# Patient Record
Sex: MALE | Race: WHITE | NOT HISPANIC OR LATINO
[De-identification: names, ages, dates, MRNs, and addresses within clinical notes are randomized per-mention and may not be internally consistent; named-entity substitution may affect disease eponyms.]

---

## 2020-05-31 DIAGNOSIS — Z01.818 ENCOUNTER FOR OTHER PREPROCEDURAL EXAMINATION: ICD-10-CM

## 2020-05-31 PROBLEM — Z00.00 ENCOUNTER FOR PREVENTIVE HEALTH EXAMINATION: Status: ACTIVE | Noted: 2020-05-31

## 2020-06-02 ENCOUNTER — APPOINTMENT (OUTPATIENT)
Dept: DISASTER EMERGENCY | Facility: CLINIC | Age: 85
End: 2020-06-02

## 2020-06-03 LAB — SARS-COV-2 N GENE NPH QL NAA+PROBE: NOT DETECTED

## 2020-06-04 VITALS
TEMPERATURE: 98 F | SYSTOLIC BLOOD PRESSURE: 184 MMHG | OXYGEN SATURATION: 99 % | DIASTOLIC BLOOD PRESSURE: 80 MMHG | HEART RATE: 55 BPM | RESPIRATION RATE: 18 BRPM

## 2020-06-04 NOTE — H&P PST ADULT - NSICDXPASTMEDICALHX_GEN_ALL_CORE_FT
PAST MEDICAL HISTORY:  AF (atrial fibrillation)     Former tobacco use quit 1984    HTN (hypertension)     Hyperlipidemia, mild     Moderate mitral regurgitation     Severe aortic stenosis PAST MEDICAL HISTORY:  AF (atrial fibrillation)     Former tobacco use quit 1984    HTN (hypertension)     Hyperlipidemia, mild     Moderate mitral regurgitation     Severe aortic stenosis     Sinus bradycardia

## 2020-06-04 NOTE — H&P PST ADULT - HISTORY OF PRESENT ILLNESS
This is a 88 year old male PMH: HTN HL CAD MI ( 1985 )  afib/flutter ( Chads- vasc 4 on Eliquis), Severe AS,  moderate- severe MR , Hypertrophic cardiomyopathy   Pt presented to cardiologist with SUÁREZ. Pt referred for RHC and LHC with future plans for possible TAVR.     Symptoms: SUÁREZ        Angina (Class): CCS 2-3        Ischemic Symptoms:     Heart Failure: none        Systolic/Diastolic/Combined:        NYHA Class (within 2 weeks):     Assessment of LVEF       EF: 66%       Assessed by: echo        Date: 5/27/20     Prior Cardiac Interventions :none              Echo (Date, Findings): 5/27/20   Normal  global and regional LV systolic function EF 60%, mild left ventricular hypertrophy ,RA/ LA mildly dilated  Severe AS PAOLO 0.63, mild AR, moderate to severe MR, , mild IA moderate TR , peak gradient 70 with mean 40     Antianginal Therapies:       5/12/15 NST Treadmill : normal  hemodynamic response, negative ECG response, normal EF no ischemia          Associated Risk Factors:        Cerebrovascular Disease: N/A       Chronic Lung Disease: N/A       Peripheral Arterial Disease: N/A       Chronic Kidney Disease (if yes, what is GFR): N/A       Uncontrolled Diabetes (if yes, what is HgbA1C or FBS): N/A       Poorly Controlled Hypertension (if yes, what is SBP): N/A       Morbid Obesity (if yes, what is BMI): N/A       History of Recent Ventricular Arrhythmia: N/A       Inability to Ambulate Safely: N/A       Need for Therapeutic Anticoagulation: N/A       Antiplatelet or Contrast Allergy: N/A  Mallampati   ASA   GFR   BRA This is a 88 year old male PMH: HTN HL CAD MI ( 1985 )  afib/flutter ( Chads- vasc 4 on Eliquis), Severe AS,  moderate- severe MR , Hypertrophic cardiomyopathy   Pt presented to cardiologist with SÁUREZ. Pt referred for RHC and LHC with future plans for possible TAVR.     Symptoms: SUÁREZ        Angina (Class): CCS 2-3        Ischemic Symptoms:     Heart Failure: none        Systolic/Diastolic/Combined:        NYHA Class (within 2 weeks):     Assessment of LVEF       EF: 66%       Assessed by: echo        Date: 5/27/20     Prior Cardiac Interventions :none              Echo (Date, Findings): 5/27/20   Normal  global and regional LV systolic function EF 60%, mild left ventricular hypertrophy ,RA/ LA mildly dilated  Severe AS PAOLO 0.63, mild AR, moderate to severe MR, , mild AZ moderate TR , peak gradient 70 with mean 40     Antianginal Therapies:       5/12/15 NST Treadmill : normal  hemodynamic response, negative ECG response, normal EF no ischemia          Associated Risk Factors:        Cerebrovascular Disease: N/A       Chronic Lung Disease: N/A       Peripheral Arterial Disease: N/A       Chronic Kidney Disease (if yes, what is GFR): N/A       Uncontrolled Diabetes (if yes, what is HgbA1C or FBS): N/A       Poorly Controlled Hypertension (if yes, what is SBP): N/A       Morbid Obesity (if yes, what is BMI): N/A       History of Recent Ventricular Arrhythmia: N/A       Inability to Ambulate Safely: N/A       Need for Therapeutic Anticoagulation: N/A       Antiplatelet or Contrast Allergy: N/A    Mallampati  2  ASA 2  GFR   BRA 1.2

## 2020-06-04 NOTE — H&P PST ADULT - ASSESSMENT
This is a 88 year old male PMH: HTN HL CAD MI ( 1985 )  afib/flutter ( Chads- vasc 4 on Eliquis), Severe AS,  moderate- severe MR , Hypertrophic cardiomyopathy   Pt presented to cardiologist with SUÁREZ. Pt referred for RHC and LHC with future plans for possible TAVR. Npw for LHC and RHC.         Plan: PRE-PROCEDURE ASSESSMENT    -Patient seen and examined  -Labs reviewed  -Pre-procedure teaching completed with patient   -Questions answered about patients concerns     -instructed to NPO after midnight.   - Last dose of eliquis was tuesday

## 2020-06-05 ENCOUNTER — OUTPATIENT (OUTPATIENT)
Dept: OUTPATIENT SERVICES | Facility: HOSPITAL | Age: 85
LOS: 1 days | Discharge: HOME | End: 2020-06-05
Payer: MEDICARE

## 2020-06-05 ENCOUNTER — TRANSCRIPTION ENCOUNTER (OUTPATIENT)
Age: 85
End: 2020-06-05

## 2020-06-05 VITALS
SYSTOLIC BLOOD PRESSURE: 150 MMHG | DIASTOLIC BLOOD PRESSURE: 66 MMHG | RESPIRATION RATE: 15 BRPM | HEART RATE: 79 BPM | OXYGEN SATURATION: 97 %

## 2020-06-05 DIAGNOSIS — Z98.890 OTHER SPECIFIED POSTPROCEDURAL STATES: Chronic | ICD-10-CM

## 2020-06-05 DIAGNOSIS — I25.10 ATHEROSCLEROTIC HEART DISEASE OF NATIVE CORONARY ARTERY WITHOUT ANGINA PECTORIS: ICD-10-CM

## 2020-06-05 LAB
ANION GAP SERPL CALC-SCNC: 10 MMOL/L — SIGNIFICANT CHANGE UP (ref 5–17)
APTT BLD: 29.7 SEC — SIGNIFICANT CHANGE UP (ref 27.5–36.3)
BUN SERPL-MCNC: 24 MG/DL — HIGH (ref 8–20)
CALCIUM SERPL-MCNC: 9.5 MG/DL — SIGNIFICANT CHANGE UP (ref 8.6–10.2)
CHLORIDE SERPL-SCNC: 99 MMOL/L — SIGNIFICANT CHANGE UP (ref 98–107)
CO2 SERPL-SCNC: 26 MMOL/L — SIGNIFICANT CHANGE UP (ref 22–29)
CREAT SERPL-MCNC: 1.34 MG/DL — HIGH (ref 0.5–1.3)
GLUCOSE SERPL-MCNC: 106 MG/DL — HIGH (ref 70–99)
HCT VFR BLD CALC: 42.3 % — SIGNIFICANT CHANGE UP (ref 39–50)
HGB BLD-MCNC: 13.5 G/DL — SIGNIFICANT CHANGE UP (ref 13–17)
INR BLD: 1.19 RATIO — HIGH (ref 0.88–1.16)
MCHC RBC-ENTMCNC: 29.5 PG — SIGNIFICANT CHANGE UP (ref 27–34)
MCHC RBC-ENTMCNC: 31.9 GM/DL — LOW (ref 32–36)
MCV RBC AUTO: 92.4 FL — SIGNIFICANT CHANGE UP (ref 80–100)
PLATELET # BLD AUTO: 186 K/UL — SIGNIFICANT CHANGE UP (ref 150–400)
POTASSIUM SERPL-MCNC: 4 MMOL/L — SIGNIFICANT CHANGE UP (ref 3.5–5.3)
POTASSIUM SERPL-SCNC: 4 MMOL/L — SIGNIFICANT CHANGE UP (ref 3.5–5.3)
PROTHROM AB SERPL-ACNC: 13.5 SEC — HIGH (ref 10–12.9)
RBC # BLD: 4.58 M/UL — SIGNIFICANT CHANGE UP (ref 4.2–5.8)
RBC # FLD: 15.8 % — HIGH (ref 10.3–14.5)
SODIUM SERPL-SCNC: 135 MMOL/L — SIGNIFICANT CHANGE UP (ref 135–145)
WBC # BLD: 5.4 K/UL — SIGNIFICANT CHANGE UP (ref 3.8–10.5)
WBC # FLD AUTO: 5.4 K/UL — SIGNIFICANT CHANGE UP (ref 3.8–10.5)

## 2020-06-05 PROCEDURE — C1769: CPT

## 2020-06-05 PROCEDURE — 85730 THROMBOPLASTIN TIME PARTIAL: CPT

## 2020-06-05 PROCEDURE — 99152 MOD SED SAME PHYS/QHP 5/>YRS: CPT

## 2020-06-05 PROCEDURE — C1874: CPT

## 2020-06-05 PROCEDURE — 93010 ELECTROCARDIOGRAM REPORT: CPT

## 2020-06-05 PROCEDURE — C1887: CPT

## 2020-06-05 PROCEDURE — 85610 PROTHROMBIN TIME: CPT

## 2020-06-05 PROCEDURE — 99153 MOD SED SAME PHYS/QHP EA: CPT

## 2020-06-05 PROCEDURE — 93456 R HRT CORONARY ARTERY ANGIO: CPT | Mod: XU

## 2020-06-05 PROCEDURE — C1894: CPT

## 2020-06-05 PROCEDURE — 93005 ELECTROCARDIOGRAM TRACING: CPT

## 2020-06-05 PROCEDURE — 36415 COLL VENOUS BLD VENIPUNCTURE: CPT

## 2020-06-05 PROCEDURE — C1725: CPT

## 2020-06-05 PROCEDURE — 85027 COMPLETE CBC AUTOMATED: CPT

## 2020-06-05 PROCEDURE — C9600: CPT | Mod: LD

## 2020-06-05 PROCEDURE — C1889: CPT

## 2020-06-05 PROCEDURE — 80048 BASIC METABOLIC PNL TOTAL CA: CPT

## 2020-06-05 RX ORDER — CLOPIDOGREL BISULFATE 75 MG/1
300 TABLET, FILM COATED ORAL ONCE
Refills: 0 | Status: COMPLETED | OUTPATIENT
Start: 2020-06-05 | End: 2020-06-05

## 2020-06-05 RX ORDER — LOSARTAN POTASSIUM 100 MG/1
100 TABLET, FILM COATED ORAL DAILY
Refills: 0 | Status: DISCONTINUED | OUTPATIENT
Start: 2020-06-05 | End: 2020-06-20

## 2020-06-05 RX ORDER — CLOPIDOGREL BISULFATE 75 MG/1
75 TABLET, FILM COATED ORAL DAILY
Refills: 0 | Status: DISCONTINUED | OUTPATIENT
Start: 2020-06-06 | End: 2020-06-20

## 2020-06-05 RX ORDER — AMLODIPINE BESYLATE 2.5 MG/1
10 TABLET ORAL ONCE
Refills: 0 | Status: COMPLETED | OUTPATIENT
Start: 2020-06-05 | End: 2020-06-05

## 2020-06-05 RX ORDER — ASPIRIN/CALCIUM CARB/MAGNESIUM 324 MG
81 TABLET ORAL DAILY
Refills: 0 | Status: DISCONTINUED | OUTPATIENT
Start: 2020-06-06 | End: 2020-06-20

## 2020-06-05 RX ORDER — ASPIRIN/CALCIUM CARB/MAGNESIUM 324 MG
1 TABLET ORAL
Qty: 0 | Refills: 0 | DISCHARGE
Start: 2020-06-05

## 2020-06-05 RX ORDER — CLOPIDOGREL BISULFATE 75 MG/1
1 TABLET, FILM COATED ORAL
Qty: 90 | Refills: 3
Start: 2020-06-05 | End: 2021-01-01

## 2020-06-05 RX ORDER — PANTOPRAZOLE SODIUM 20 MG/1
1 TABLET, DELAYED RELEASE ORAL
Qty: 30 | Refills: 1
Start: 2020-06-05 | End: 2020-08-03

## 2020-06-05 RX ORDER — HYDRALAZINE HCL 50 MG
5 TABLET ORAL ONCE
Refills: 0 | Status: COMPLETED | OUTPATIENT
Start: 2020-06-05 | End: 2020-06-05

## 2020-06-05 RX ADMIN — LOSARTAN POTASSIUM 100 MILLIGRAM(S): 100 TABLET, FILM COATED ORAL at 12:22

## 2020-06-05 RX ADMIN — AMLODIPINE BESYLATE 10 MILLIGRAM(S): 2.5 TABLET ORAL at 13:48

## 2020-06-05 RX ADMIN — CLOPIDOGREL BISULFATE 300 MILLIGRAM(S): 75 TABLET, FILM COATED ORAL at 16:00

## 2020-06-05 RX ADMIN — Medication 5 MILLIGRAM(S): at 13:12

## 2020-06-05 NOTE — ASU PATIENT PROFILE, ADULT - PMH
AF (atrial fibrillation)    Former tobacco use  quit 1984  HTN (hypertension)    Hyperlipidemia, mild    Moderate mitral regurgitation    Severe aortic stenosis    Sinus bradycardia

## 2020-06-05 NOTE — DISCHARGE NOTE PROVIDER - HOSPITAL COURSE
CORONARY VESSELS: The coronary circulation is co-dominant.    LM:  --  LM: Angiography showed minor luminal irregularities with no flow    limiting lesions.    LAD:   --  Mid LAD: There was a tubular 95 % stenosis. The lesion was    eccentric and moderately calcified.    CX:   --  Proximal circumflex: There was a tubular 90 % stenosis. The    lesion was eccentric and moderately calcified.    --  Mid circumflex: There was a diffuse 50 % stenosis. The lesion was    irregularly contoured and heavily calcified.    RCA:   --  Ostial RCA: There was a 100 % stenosis. This lesion is a chronic    total occlusion.    COMPLICATIONS: There were no complications.    DIAGNOSTIC IMPRESSIONS: Moderate Pulmonary Hypertension. 2. Normal LV    function with LVEF of 65% on TTE from 11/2019. 3. Severe Aortic Stenosis    by TTE with a mean PG of 40mm Hgand PAOLO of 07cm2. 4. Triple vessel CAD.    5. Successful PCI to Proximal to mid LAD with ZESsx3.    DIAGNOSTIC RECOMMENDATIONS: Plavix for at least 6 months. 2. Aspirin 81mg    for 2 weeks, then to resume after Plavix is discontinued. 3. May resume    Eliquis tonite or in AM. 4. PPI or [id number] for GI protection. 5. CT    surgical evaluation for ZACARIAS. 6. Staged PCI to LCX after the ZACARIAS.    INTERVENTIONAL IMPRESSIONS: Moderate Pulmonary Hypertension. 2. Normal LV    function with LVEF of 65% on TTE from 11/2019. 3. Severe Aortic Stenosis    by TTE with a mean PG of 40mm Hg and PAOLO of 07cm2. 4. Triple vessel CAD.    5. Successful PCI to Proximal to mid LAD with ZESsx3.    INTERVENTIONAL RECOMMENDATIONS: Plavix for at least 6 months. 2. Aspirin    81mg for 2 weeks, then to resume after Plavix is discontinued. 3. May    resume Eliquis tonite or in AM. 4. PPI or [id number] for GI protection. 5.    CT surgical evaluation for ZACARIAS. 6. Staged PCI to LCX after the ZACARIAS.

## 2020-06-05 NOTE — DISCHARGE NOTE PROVIDER - CARE PROVIDER_API CALL
ERIKA ESTRELLA  [id number]  [address] [facility] [address]  [place] [address]  Phone: [phone]  Fax: ([phone]  Follow Up Time:

## 2020-06-05 NOTE — DISCHARGE NOTE PROVIDER - NSDCACTIVITY_GEN_ALL_CORE
Restricted use with no heavy lifting of affected arm for 48 hours.  No submerging the arm in water for 48 hours.  You may start showering today.  Call your doctor for any bleeding, swelling, loss of sensation in the hand or fingers, or fingers turning blue.  If heavy bleeding or large lumps form, hold pressure at the spot and come to the Emergency Room.    resume eliquis in the am     monitor for bleeding    start baby aspirin for the next 2 weeks    continue plavix 75 mg po daily indefinitely.    call to schedule TAVR with ct surgery./Driving allowed/Walking - Indoors allowed

## 2020-06-05 NOTE — DISCHARGE NOTE PROVIDER - NSDCMRMEDTOKEN_GEN_ALL_CORE_FT
aspirin 81 mg oral delayed release tablet: 1 tab(s) orally once a day  Eliquis 2.5 mg oral tablet: 1 tab(s) orally 2 times a day  finasteride 5 mg oral tablet: 1 tab(s) orally once a day  Plavix 75 mg oral tablet: 1 tab(s) orally once a day   Protonix 40 mg oral delayed release tablet: 1 tab(s) orally once a day   rosuvastatin 40 mg oral tablet: 1 tab(s) orally once a day  tamsulosin 0.4 mg oral capsule: 1 cap(s) orally once a day  valsartan 320 mg oral tablet: 1 tab(s) orally once a day  Zetia 10 mg oral tablet: 1 tab(s) orally once a day

## 2020-06-05 NOTE — PROGRESS NOTE ADULT - SUBJECTIVE AND OBJECTIVE BOX
Department of Cardiology                                                                  [facility]/[place] [address] [address]                                                            Telephone: [phone]. Fax:[phone]                                                                                         POST PCI NOTE       Subjective:  88y  Male who had a left heart catheterization which showed:       LM: Normal       LAD: Normal       LCX: Normal       RCA: Normal    PAST MEDICAL & SURGICAL HISTORY:  Sinus bradycardia  Former tobacco use: quit   Hyperlipidemia, mild  HTN (hypertension)  Moderate mitral regurgitation  AF (atrial fibrillation)  Severe aortic stenosis  H/O hernia repair    FAMILY HISTORY:    Home Medications:  Eliquis 2.5 mg oral tablet: 1 tab(s) orally 2 times a day (2020 07:59)  finasteride 5 mg oral tablet: 1 tab(s) orally once a day (2020 07:58)  rosuvastatin 40 mg oral tablet: 1 tab(s) orally once a day (2020 07:56)  tamsulosin 0.4 mg oral capsule: 1 cap(s) orally once a day (2020 07:59)  valsartan 320 mg oral tablet: 1 tab(s) orally once a day (2020 07:55)  Zetia 10 mg oral tablet: 1 tab(s) orally once a day (2020 07:56)    Patient is a 88y old  Male who presents with a chief complaint of SUÁREZ with severe AS (2020 15:35)    HEALTH ISSUES - PROBLEM Dx:        HPI:  This is a 88 year old male PMH: HTN HL CAD MI (  )  afib/flutter ( Chads- vasc 4 on Eliquis), Severe AS,  moderate- severe MR , Hypertrophic cardiomyopathy   Pt presented to cardiologist with SUÁREZ. Pt referred for RHC and LHC with future plans for possible TAVR.     Symptoms: SUÁREZ        Angina (Class): CCS 2-3        Ischemic Symptoms:     Heart Failure: none        Systolic/Diastolic/Combined:        NYHA Class (within 2 weeks):     Assessment of LVEF       EF: 66%       Assessed by: echo        Date: 20     Prior Cardiac Interventions :none              Echo (Date, Findings): 20   Normal  global and regional LV systolic function EF 60%, mild left ventricular hypertrophy ,RA/ LA mildly dilated  Severe AS PAOLO 0.63, mild AR, moderate to severe MR, , mild AZ moderate TR , peak gradient 70 with mean 40     Antianginal Therapies:       5/12/15 NST Treadmill : normal  hemodynamic response, negative ECG response, normal EF no ischemia          Associated Risk Factors:        Cerebrovascular Disease: N/A       Chronic Lung Disease: N/A       Peripheral Arterial Disease: N/A       Chronic Kidney Disease (if yes, what is GFR): N/A       Uncontrolled Diabetes (if yes, what is HgbA1C or FBS): N/A       Poorly Controlled Hypertension (if yes, what is SBP): N/A       Morbid Obesity (if yes, what is BMI): N/A       History of Recent Ventricular Arrhythmia: N/A       Inability to Ambulate Safely: N/A       Need for Therapeutic Anticoagulation: N/A       Antiplatelet or Contrast Allergy: N/A    Mallampati  2  ASA 2  GFR   BRA 1.2 (2020 15:35)    General: No fatigue, no fevers/chills  Respiratory: No dyspnea, no cough, no wheeze  CV: No chest pain, no palpitations  Abd: No nausea  Neuro: No headache, no dizziness  No Known Allergies      Objective:  Vital Signs Last 24 Hrs  T(C): 36.5 (2020 15:35), Max: 36.7 (2020 15:35)  T(F): 97.7 (2020 15:35), Max: 98 (2020 15:35)  HR: 41 (2020 12:15) (40 - 55)  BP: 200/83 (2020 12:15) (180/79 - 209/87)  BP(mean): 113 (2020 15:35) (113 - 113)  RR: 16 (2020 12:15) (12 - 18)  SpO2: 96% (2020 12:15) (95% - 99%)    CM: SR  Neuro: A&OX3, CN 2-12 intact  HEENT: NC, AT  Lungs: CTA B/L  CV: S1, S2, no murmur, RRR  Abd: Soft  Right Groin: Soft, no bleeding, no hematoma  Extremity: + distal pulses  EK.5   5.40  )-----------( 186      ( 2020 07:48 )             42.3     06-05    135  |  99  |  24.0<H>  ----------------------------<  106<H>  4.0   |  26.0  |  1.34<H>    Ca    9.5      2020 07:48      PT/INR - ( 2020 07:48 )   PT: 13.5 sec;   INR: 1.19 ratio         PTT - ( 2020 07:48 )  PTT:29.7 sec        PLAN:    - bedrest for  4    hours  - PT Will be dc home on statin, NO BB, Diovan, Plavix,, eliquis and baby aspirin for 2 weeks	  -PT WAS NOT DISCHARGED ON BB CCB STATIN ASA BECAUSE   -HOLD METFORMIN POST CATH 2 DAYS THEN RESUME AS USUAL DOSING.   - PT INTOLERANT TO STATINS WILL FOLLOW UP WITH CARDIOLOGY TO DISCUSS PRALULENT THERAPY  - DISCUSSED AND RECOMMEND CARDIAC REHAB TO OPTIMIZE FULL RECOVERY   - Antiplatelet theray to continue for one year minimum  - DUE TO AGE RELATED BLEEDING RISKS PATIENT WILL CONTINUE ON ELIQUIS AND PLAVIX NO ASPIRIN AT THIS TIME.    -TO CONTINUE  CURRENT MEDICATION AS USUAL Statin, BB, ACE/ARB.  - follow up with cardiology 2 weeks post procedure   - post produral care and instructions reviewed with the patient as well as questions answered.    - plan for discharge home when stable   - Diet instructions given for low cholesterol and Low sodium. Department of Cardiology                                                                  [facility]/[place] [address] [place] [address]                                                            Telephone: [phone]. Fax:[phone]                                                                                         POST PCI NOTE   HPI:  This is a 88 year old male PMH: HTN HL CAD MI ( 1985 )  afib/flutter ( Chads- vasc 4 on Eliquis), Severe AS,  moderate- severe MR , Hypertrophic cardiomyopathy   Pt presented to cardiologist with SUÁREZ. Pt referred for RHC and LHC with future plans for possible TAVR.         Subjective:  88y  Male who had a left heart catheterization which showed:       Significant disease of the LAD: Normal         PAST MEDICAL & SURGICAL HISTORY:  Sinus bradycardia  Former tobacco use: quit 1984  Hyperlipidemia, mild  HTN (hypertension)  Moderate mitral regurgitation  AF (atrial fibrillation)  Severe aortic stenosis  H/O hernia repair    FAMILY HISTORY:    Home Medications:  Eliquis 2.5 mg oral tablet: 1 tab(s) orally 2 times a day (05 Jun 2020 07:59)  finasteride 5 mg oral tablet: 1 tab(s) orally once a day (05 Jun 2020 07:58)  rosuvastatin 40 mg oral tablet: 1 tab(s) orally once a day (05 Jun 2020 07:56)  tamsulosin 0.4 mg oral capsule: 1 cap(s) orally once a day (05 Jun 2020 07:59)  valsartan 320 mg oral tablet: 1 tab(s) orally once a day (05 Jun 2020 07:55)  Zetia 10 mg oral tablet: 1 tab(s) orally once a day (05 Jun 2020 07:56)          Echo (Date, Findings): 5/27/20   Normal  global and regional LV systolic function EF 60%, mild left ventricular hypertrophy ,RA/ LA mildly dilated  Severe AS PAOLO 0.63, mild AR, moderate to severe MR, , mild AR moderate TR , peak gradient 70 with mean 40            5/12/15 NST Treadmill : normal  hemodynamic response, negative ECG response, normal EF no ischemia          Associated Risk Factors:        Cerebrovascular Disease: N/A       Chronic Lung Disease: N/A       Peripheral Arterial Disease: N/A       Chronic Kidney Disease (if yes, what is GFR): N/A       Uncontrolled Diabetes (if yes, what is HgbA1C or FBS): N/A       Poorly Controlled Hypertension (if yes, what is SBP): N/A       Morbid Obesity (if yes, what is BMI): N/A       History of Recent Ventricular Arrhythmia: N/A       Inability to Ambulate Safely: N/A       Need for Therapeutic Anticoagulation: N/A       Antiplatelet or Contrast Allergy: N/A    Mallampati  2  ASA 2  GFR 47  BRA 1.2 (04 Jun 2020 15:35)    General: No fatigue, no fevers/chills  Respiratory: No dyspnea, no cough, no wheeze  CV: No chest pain, no palpitations  Abd: No nausea  Neuro: No headache, no dizziness  No Known Allergies      Objective:  Vital Signs Last 24 Hrs  T(C): 36.5 (04 Jun 2020 15:35), Max: 36.7 (04 Jun 2020 15:35)  T(F): 97.7 (04 Jun 2020 15:35), Max: 98 (04 Jun 2020 15:35)  HR: 41 (05 Jun 2020 12:15) (40 - 55)  BP: 200/83 (05 Jun 2020 12:15) (180/79 - 209/87)  BP(mean): 113 (04 Jun 2020 15:35) (113 - 113)  RR: 16 (05 Jun 2020 12:15) (12 - 18)  SpO2: 96% (05 Jun 2020 12:15) (95% - 99%)    CM: bradycardia afib  Neuro: A&OX3, CN 2-12 intact  HEENT: NC, AT  Lungs: CTA B/L  CV: S1, S2, no murmur, RRR  Abd: Soft  Right radial: Soft, no bleeding, no hematoma  Extremity: + distal pulses  EKG: Post LAD RBBB No Acute Change.                         13.5   5.40  )-----------( 186      ( 05 Jun 2020 07:48 )             42.3     06-05    135  |  99  |  24.0<H>  ----------------------------<  106<H>  4.0   |  26.0  |  1.34<H>    Ca    9.5      05 Jun 2020 07:48      PT/INR - ( 05 Jun 2020 07:48 )   PT: 13.5 sec;   INR: 1.19 ratio         PTT - ( 05 Jun 2020 07:48 )  PTT:29.7 sec        PLAN:    - bedrest for  4    hours  - PT Will be dc home on statin, NO BB, Diovan, Plavix,, eliquis and baby aspirin for 2 weeks	  -PT WAS NOT DISCHARGED ON BB CCB STATIN ASA BECAUSE   -HOLD METFORMIN POST CATH 2 DAYS THEN RESUME AS USUAL DOSING.   - PT INTOLERANT TO STATINS WILL FOLLOW UP WITH CARDIOLOGY TO DISCUSS PRALULENT THERAPY  - DISCUSSED AND RECOMMEND CARDIAC REHAB TO OPTIMIZE FULL RECOVERY   - Antiplatelet theray to continue for one year minimum  - DUE TO AGE RELATED BLEEDING RISKS PATIENT WILL CONTINUE ON ELIQUIS AND PLAVIX NO ASPIRIN AT THIS TIME.    -TO CONTINUE  CURRENT MEDICATION AS USUAL Statin, BB, ACE/ARB.  - follow up with cardiology 2 weeks post procedure   - post produral care and instructions reviewed with the patient as well as questions answered.    - plan for discharge home when stable   - Diet instructions given for low cholesterol and Low sodium. Department of Cardiology                                                                  [facility]/[place] [address] [place] [address]                                                            Telephone: [phone]. Fax:[phone]                                                                                         POST PCI NOTE   HPI:  This is a 88 year old male PMH: HTN HL CAD MI ( 1985 )  afib/flutter ( Chads- vasc 4 on Eliquis), Severe AS,  moderate- severe MR , Hypertrophic cardiomyopathy   Pt presented to cardiologist with SUÁREZ. Pt referred for RHC and LHC with future plans for possible TAVR.         Subjective:  88y  Male who had a left heart catheterization which showed:       Significant disease of the LAD: Normal         PAST MEDICAL & SURGICAL HISTORY:  Sinus bradycardia  Former tobacco use: quit 1984  Hyperlipidemia, mild  HTN (hypertension)  Moderate mitral regurgitation  AF (atrial fibrillation)  Severe aortic stenosis  H/O hernia repair    FAMILY HISTORY:    Home Medications:  Eliquis 2.5 mg oral tablet: 1 tab(s) orally 2 times a day (05 Jun 2020 07:59)  finasteride 5 mg oral tablet: 1 tab(s) orally once a day (05 Jun 2020 07:58)  rosuvastatin 40 mg oral tablet: 1 tab(s) orally once a day (05 Jun 2020 07:56)  tamsulosin 0.4 mg oral capsule: 1 cap(s) orally once a day (05 Jun 2020 07:59)  valsartan 320 mg oral tablet: 1 tab(s) orally once a day (05 Jun 2020 07:55)  Zetia 10 mg oral tablet: 1 tab(s) orally once a day (05 Jun 2020 07:56)          Echo (Date, Findings): 5/27/20   Normal  global and regional LV systolic function EF 60%, mild left ventricular hypertrophy ,RA/ LA mildly dilated  Severe AS PAOLO 0.63, mild AR, moderate to severe MR, , mild ND moderate TR , peak gradient 70 with mean 40            5/12/15 NST Treadmill : normal  hemodynamic response, negative ECG response, normal EF no ischemia          Associated Risk Factors:        Cerebrovascular Disease: N/A       Chronic Lung Disease: N/A       Peripheral Arterial Disease: N/A       Chronic Kidney Disease (if yes, what is GFR): N/A       Uncontrolled Diabetes (if yes, what is HgbA1C or FBS): N/A       Poorly Controlled Hypertension (if yes, what is SBP): N/A       Morbid Obesity (if yes, what is BMI): N/A       History of Recent Ventricular Arrhythmia: N/A       Inability to Ambulate Safely: N/A       Need for Therapeutic Anticoagulation: N/A       Antiplatelet or Contrast Allergy: N/A    Mallampati  2  ASA 2  GFR 47  BRA 1.2 (04 Jun 2020 15:35)    General: No fatigue, no fevers/chills  Respiratory: No dyspnea, no cough, no wheeze  CV: No chest pain, no palpitations  Abd: No nausea  Neuro: No headache, no dizziness  No Known Allergies      Objective:  Vital Signs Last 24 Hrs  T(C): 36.5 (04 Jun 2020 15:35), Max: 36.7 (04 Jun 2020 15:35)  T(F): 97.7 (04 Jun 2020 15:35), Max: 98 (04 Jun 2020 15:35)  HR: 41 (05 Jun 2020 12:15) (40 - 55)  BP: 200/83 (05 Jun 2020 12:15) (180/79 - 209/87)  BP(mean): 113 (04 Jun 2020 15:35) (113 - 113)  RR: 16 (05 Jun 2020 12:15) (12 - 18)  SpO2: 96% (05 Jun 2020 12:15) (95% - 99%)    CM: bradycardia afib  Neuro: A&OX3, CN 2-12 intact  HEENT: NC, AT  Lungs: CTA B/L  CV: S1, S2, no murmur, RRR  Abd: Soft  Right radial: Soft, no bleeding, no hematoma  Extremity: + distal pulses  EKG: Post LAD RBBB No Acute Change.                         13.5   5.40  )-----------( 186      ( 05 Jun 2020 07:48 )             42.3     06-05    135  |  99  |  24.0<H>  ----------------------------<  106<H>  4.0   |  26.0  |  1.34<H>    Ca    9.5      05 Jun 2020 07:48      PT/INR - ( 05 Jun 2020 07:48 )   PT: 13.5 sec;   INR: 1.19 ratio         PTT - ( 05 Jun 2020 07:48 )  PTT:29.7 sec    89 yo male with Afib post C for TAVR wound up having LAD stents x3 will still need TAVR at some point.      PLAN:    - bedrest for  4    hours  - PT Will be dc home on statin, NO BB, Diovan, Plavix,, eliquis and baby aspirin for 2 weeks	  -Follow up with CTS for TAVR  - DISCUSSED AND RECOMMEND CARDIAC REHAB TO OPTIMIZE FULL RECOVERY   - Antiplatelet theray to continue for one year minimum  - follow up with cardiology 2 weeks post procedure   - post produral care and instructions reviewed with the patient as well as questions answered.    - plan for discharge home when stable   - Diet instructions given for low cholesterol and Low sodium.

## 2020-06-05 NOTE — DISCHARGE NOTE NURSING/CASE MANAGEMENT/SOCIAL WORK - PATIENT PORTAL LINK FT
You can access the FollowMyHealth Patient Portal offered by [facility] by registering at the following website: [email or web address]. By joining [employer]’s FollowMyHealth portal, you will also be able to view your health information using other applications (apps) compatible with our system.

## 2020-06-05 NOTE — DISCHARGE NOTE PROVIDER - NSDCFUADDINST_GEN_ALL_CORE_FT
INTERVENTIONAL RECOMMENDATIONS FROM DR Olson for at least 6 months. 2. Aspirin  81mg for 2 weeks, then to resume after Plavix is discontinued. 3. May  resume Eliquis tonite or in AM. 4. PPI or [id number] for GI protection. 5.  CT surgical evaluation for ZACARIAS. 6. Staged PCI to LCX after the ZACARIAS.

## 2020-06-08 DIAGNOSIS — I35.0 NONRHEUMATIC AORTIC (VALVE) STENOSIS: ICD-10-CM

## 2020-06-09 PROCEDURE — 99222 1ST HOSP IP/OBS MODERATE 55: CPT

## 2020-06-09 NOTE — CONSULT NOTE ADULT - SUBJECTIVE AND OBJECTIVE BOX
Surgeon:    Consult requesting by:    HISTORY OF PRESENT ILLNESS:  88y Male    PAST MEDICAL & SURGICAL HISTORY:  Sinus bradycardia  Former tobacco use: quit 1984  Hyperlipidemia, mild  HTN (hypertension)  Moderate mitral regurgitation  AF (atrial fibrillation)  Severe aortic stenosis  H/O hernia repair      MEDICATIONS  (STANDING):  aspirin enteric coated 81 milliGRAM(s) Oral daily  clopidogrel Tablet 75 milliGRAM(s) Oral daily  losartan 100 milliGRAM(s) Oral daily    MEDICATIONS  (PRN):    Antiplatelet therapy:                           Last dose/amt:    Allergies    No Known Allergies    Intolerances        SOCIAL HISTORY:  Smoker: [ ] Yes  [ ] No        PACK YEARS:                         WHEN QUIT?  ETOH use: [ ] Yes  [ ] No              FREQUENCY / QUANTITY:  Ilicit Drug use:  [ ] Yes  [ ] No  Occupation:  Live with:  Assisted device use:    FAMILY HISTORY:      Review of Systems  CONSTITUTIONAL:  Fevers[ ] chills[ ] sweats[ ] fatigue[ ] weight loss[ ] weight gain [ ]                                     NEGATIVE [ ]   NEURO:  parathesias[ ] seizures [ ]  syncope [ ]  confusion [ ]                                                                                NEGATIVE[ ]   EYES: glasses[ ]  blurry vision[ ]  discharge[ ] pain[ ] glaucoma [ ]                                                                          NEGATIVE[ ]   ENMT:  difficulty hearing [ ]  vertigo[ ]  dysphagia[ ] epistaxis[ ] recent dental work [ ]                                    NEGATIVE[ ]   CV:  chest pain[ ] palpitations[ ] SUÁREZ [ ] diaphoresis [ ]                                                                                           NEGATIVE[ ]   RESPIRATORY:  wheezing[ ] SOB[ ] cough [ ] sputum[ ] hemoptysis[ ]                                                                  NEGATIVE[ ]   GI:  nausea[ ]  vommiting [ ]  diarrhea[ ] constipation [ ] melena [ ]                                                                      NEGATIVE[ ]   : hematuria[ ]  dysuria[ ] urgency[ ] incontinence[ ]                                                                                            NEGATIVE[ ]   MUSKULOSKELETAL:  arthritis[ ]  joint swelling [ ] muscle weakness [ ]                                                                NEGATIVE[ ]   SKIN/BREAST:  rash[ ] itching [ ]  hair loss[ ] masses[ ]                                                                                              NEGATIVE[ ]   PSYCH:  dementia [ ] depresion [ ] anxiety[ ]                                                                                                               NEGATIVE[ ]   HEME/LYMPH:  bruises easily[ ] enlarged lymph nodes[ ] tender lymph nodes[ ]                                               NEGATIVE[ ]   ENDOCRINE:  cold intolerance[ ] heat intolerance[ ] polydipsia[ ]                                                                          NEGATIVE[ ]     PHYSICAL EXAM  Vital Signs Last 24 Hrs  T(C): --  T(F): --  HR: --  BP: --  BP(mean): --  RR: --  SpO2: --    CONSTITUTIONAL:                                                                          WNL[ ]   Neuro: WNL[ ] Normal exam oriented to person/place & time with no focal motor or sensory  deficits. Other                     Eyes: WNL[ ]   Normal exam of conjunctiva & lids, pupils equally reactive. Other     ENT: WNL[ ]    Normal exam of nasal/oral mucosa with absence of cyanosis. Other  Neck: WNL[ ]  Normal exam of jugular veins, trachea & thyroid. Other  Chest: WNL[ ] Normal lung exam with good air movement absence of wheezes, rales, or rhonchi: Other                                                                                CV:  Auscultation: normal [ ] S3[ ] S4[ ] Irregular [ ] Rub[ ] Clicks[ ]    Murmurs none:[ ]systolic [ ]  diastolic [ ] holosystolic [ ]  Carotids: No Bruits[ ] Other                 Abdominal Aorta: normal [ ] nonpalpable[ ]Other                                                                                      GI:           WNL[ ] Normal exam of abdomen, liver & spleen with no noted masses or tenderness. Other                                                                                                        Extremities: WNL[ ] Normal no evidence of cyanosis or deformity Edema: none[ ]trace[ ]1+[ ]2+[ ]3+[ ]4+[ ]  Lower Extremity Pulses: Right[ ] Left[ ]Varicosities[ ]  SKIN :WNL[ ] Normal exam to inspection & palation. Other:                                                          LABS:          Cardiac Cath: < from: Cardiac Cath Lab - Adult (06.05.20 @ 08:25) >  VENTRICLES: Global left ventricular function was normal. EF by echo was 65  %.  CORONARY VESSELS: The coronary circulation is co-dominant.  LM:  --  LM: Angiography showed minor luminal irregularities with no flow  limiting lesions.  LAD:   --  Mid LAD: There was a tubular 95 % stenosis. The lesion was  eccentric and moderately calcified.  CX:   --  Proximal circumflex: There was a tubular 90 % stenosis. The  lesion was eccentric and moderately calcified.  --  Mid circumflex: There was a diffuse 50 % stenosis. The lesion was  irregularly contoured and heavily calcified.  RCA:   --  Ostial RCA: There was a 100 % stenosis. This lesion is a chronic  total occlusion.  COMPLICATIONS: There were no complications.  DIAGNOSTIC IMPRESSIONS: Moderate Pulmonary Hypertension. 2. Normal LV  function with LVEF of 65% on TTE from 11/2019. 3. Severe Aortic Stenosis  by TTE with a mean PG of 40mm Hgand PAOLO of 07cm2. 4. Triple vessel CAD.  5. Successful PCI to Proximal to mid LAD with ZESsx3.  DIAGNOSTIC RECOMMENDATIONS: Plavix for at least 6 months. 2. Aspirin 81mg  for 2 weeks, then to resume after Plavix is discontinued. 3. May resume  Eliquis tonite or in AM. 4. PPI or [id number] for GI protection. 5. CT  surgical evaluation for ZACARIAS. 6. Staged PCI to LCX after the ZACARIAS.  INTERVENTIONAL IMPRESSIONS: Moderate Pulmonary Hypertension. 2. Normal LV  function with LVEF of 65% on TTE from 11/2019. 3. Severe Aortic Stenosis  by TTE with a mean PG of 40mm Hg and PAOLO of 07cm2. 4. Triple vessel CAD.  5. Successful PCI to Proximal to mid LAD with ZESsx3.  INTERVENTIONAL RECOMMENDATIONS: Plavix for at least 6 months. 2. Aspirin  81mg for 2 weeks, then to resume after Plavix is discontinued. 3. May  resume Eliquis tonite or in AM. 4. PPI or [id number] for GI protection. 5.  CT surgical evaluation for ZACARIAS. 6. Staged PCI to LCX after the ZACARIAS.  Prepared and signed by  Clay Cary M.D.  Signed 06/05/2020 11:03:54    < end of copied text >      TTE / WJOCIECH:  none Surgeon:    Consult requesting by:    HISTORY OF PRESENT ILLNESS:  The patient is a very pleasant 88-year-old male. He lives with his wife and remains quite active. His cardiovascular structures including hypertension, hyperlipidemia, and chronic atrial fibrillation. He is a former smoker but quit many years ago. The patient reports worsening exertional dyspnea and occasional lower extremity edema. Echocardiogram confirmed severe aortic valve stenosis. The patient is status post cardiac catheterization. He is status post PCI of the LAD. This was a highly calcified lesion, but rotoblade was not required. There is additional moderate disease that may require PCI following T. AVR. Cardiothoracic consultation is requested consideration of the patient to transect the recover placement.    The patient's past medical history, past surgical history, family history, social history, allergies, medications, and multisystem review of systems were individually reviewed with the patient.  There are no pertinent additions or subtractions.  The patient was personally seen and examined.      PAST MEDICAL & SURGICAL HISTORY:  Sinus bradycardia  Former tobacco use: quit 1984  Hyperlipidemia, mild  HTN (hypertension)  Moderate mitral regurgitation  AF (atrial fibrillation)  Severe aortic stenosis  H/O hernia repair      MEDICATIONS  (STANDING):  aspirin enteric coated 81 milliGRAM(s) Oral daily  clopidogrel Tablet 75 milliGRAM(s) Oral daily  losartan 100 milliGRAM(s) Oral daily    MEDICATIONS  (PRN):    Antiplatelet therapy:                           Last dose/amt:    Allergies    No Known Allergies    Intolerances        SOCIAL HISTORY:  Smoker: [ ] Yes  [ ] No        PACK YEARS:                         WHEN QUIT?  ETOH use: [ ] Yes  [ ] No              FREQUENCY / QUANTITY:  Ilicit Drug use:  [ ] Yes  [ ] No  Occupation:  Live with:  Assisted device use:    FAMILY HISTORY:      Review of Systems  CONSTITUTIONAL:  Fevers[ ] chills[ ] sweats[ ] fatigue[ ] weight loss[ ] weight gain [ ]                                     NEGATIVE [ ]   NEURO:  parathesias[ ] seizures [ ]  syncope [ ]  confusion [ ]                                                                                NEGATIVE[ ]   EYES: glasses[ ]  blurry vision[ ]  discharge[ ] pain[ ] glaucoma [ ]                                                                          NEGATIVE[ ]   ENMT:  difficulty hearing [ ]  vertigo[ ]  dysphagia[ ] epistaxis[ ] recent dental work [ ]                                    NEGATIVE[ ]   CV:  chest pain[ ] palpitations[ ] SUÁREZ [ ] diaphoresis [ ]                                                                                           NEGATIVE[ ]   RESPIRATORY:  wheezing[ ] SOB[ ] cough [ ] sputum[ ] hemoptysis[ ]                                                                  NEGATIVE[ ]   GI:  nausea[ ]  vommiting [ ]  diarrhea[ ] constipation [ ] melena [ ]                                                                      NEGATIVE[ ]   : hematuria[ ]  dysuria[ ] urgency[ ] incontinence[ ]                                                                                            NEGATIVE[ ]   MUSKULOSKELETAL:  arthritis[ ]  joint swelling [ ] muscle weakness [ ]                                                                NEGATIVE[ ]   SKIN/BREAST:  rash[ ] itching [ ]  hair loss[ ] masses[ ]                                                                                              NEGATIVE[ ]   PSYCH:  dementia [ ] depresion [ ] anxiety[ ]                                                                                                               NEGATIVE[ ]   HEME/LYMPH:  bruises easily[ ] enlarged lymph nodes[ ] tender lymph nodes[ ]                                               NEGATIVE[ ]   ENDOCRINE:  cold intolerance[ ] heat intolerance[ ] polydipsia[ ]                                                                          NEGATIVE[ ]     GENERAL: frail-appearing 88-year-old gentleman, no acute distress  HEENT: Normocephalic, atraumatic, extraocular muscles intact, PERRLA, anicteric sclera, conjunctiva without injection  Neck: Supple, no carotid bruits bilaterally, no JVD  Chest: Clear to auscultation bilaterally without wheezes or rhonchi, normal I:E ratio  Cardiac: Regular rate and rhythm S1, absent S2. Harsh systolic murmur cardiac base  Abdomen: The abdomen is scaphoid, soft, nontender and nondistended.  Positive bowel sounds.  There is no hepatosplenomegaly.  There are no masses or fascial defects appreciated.  Extremities: The extremities are warm and well-perfused.  There is trace edema bilateral lower extremities  Vascular: Carotid, radial, and femoral pulses are 2-3+ bilaterally.  Dorsalis pedis and posterior tibial artery pulses are faint bilaterally  Neuro: The patient is neurologically intact.  There are no gross motor deficits.  Gait is within normal limits  Psychiatric: The patient is alert and oriented X3, mood and affect are appropriate  Skin: Without rashes      CONSTITUTIONAL:                                                                          WNL[ ]   Neuro: WNL[ ] Normal exam oriented to person/place & time with no focal motor or sensory  deficits. Other                     Eyes: WNL[ ]   Normal exam of conjunctiva & lids, pupils equally reactive. Other     ENT: WNL[ ]    Normal exam of nasal/oral mucosa with absence of cyanosis. Other  Neck: WNL[ ]  Normal exam of jugular veins, trachea & thyroid. Other  Chest: WNL[ ] Normal lung exam with good air movement absence of wheezes, rales, or rhonchi: Other                                                                                CV:  Auscultation: normal [ ] S3[ ] S4[ ] Irregular [ ] Rub[ ] Clicks[ ]    Murmurs none:[ ]systolic [ ]  diastolic [ ] holosystolic [ ]  Carotids: No Bruits[ ] Other                 Abdominal Aorta: normal [ ] nonpalpable[ ]Other                                                                                      GI:           WNL[ ] Normal exam of abdomen, liver & spleen with no noted masses or tenderness. Other                                                                                                        Extremities: WNL[ ] Normal no evidence of cyanosis or deformity Edema: none[ ]trace[ ]1+[ ]2+[ ]3+[ ]4+[ ]  Lower Extremity Pulses: Right[ ] Left[ ]Varicosities[ ]  SKIN :WNL[ ] Normal exam to inspection & palpation. Other:                                                          LABS:          Cardiac Cath: < from: Cardiac Cath Lab - Adult (06.05.20 @ 08:25) >  VENTRICLES: Global left ventricular function was normal. EF by echo was 65  %.  CORONARY VESSELS: The coronary circulation is co-dominant.  LM:  --  LM: Angiography showed minor luminal irregularities with no flow  limiting lesions.  LAD:   --  Mid LAD: There was a tubular 95 % stenosis. The lesion was  eccentric and moderately calcified.  CX:   --  Proximal circumflex: There was a tubular 90 % stenosis. The  lesion was eccentric and moderately calcified.  --  Mid circumflex: There was a diffuse 50 % stenosis. The lesion was  irregularly contoured and heavily calcified.  RCA:   --  Ostial RCA: There was a 100 % stenosis. This lesion is a chronic  total occlusion.  COMPLICATIONS: There were no complications.  DIAGNOSTIC IMPRESSIONS: Moderate Pulmonary Hypertension. 2. Normal LV  function with LVEF of 65% on TTE from 11/2019. 3. Severe Aortic Stenosis  by TTE with a mean PG of 40mm Hgand PAOLO of 07cm2. 4. Triple vessel CAD.  5. Successful PCI to Proximal to mid LAD with ZESsx3.  DIAGNOSTIC RECOMMENDATIONS: Plavix for at least 6 months. 2. Aspirin 81mg  for 2 weeks, then to resume after Plavix is discontinued. 3. May resume  Eliquis tonite or in AM. 4. PPI or [id number] for GI protection. 5. CT  surgical evaluation for ZACARIAS. 6. Staged PCI to LCX after the ZACARIAS.  INTERVENTIONAL IMPRESSIONS: Moderate Pulmonary Hypertension. 2. Normal LV  function with LVEF of 65% on TTE from 11/2019. 3. Severe Aortic Stenosis  by TTE with a mean PG of 40mm Hg and PAOLO of 07cm2. 4. Triple vessel CAD.  5. Successful PCI to Proximal to mid LAD with ZESsx3.  INTERVENTIONAL RECOMMENDATIONS: Plavix for at least 6 months. 2. Aspirin  81mg for 2 weeks, then to resume after Plavix is discontinued. 3. May  resume Eliquis tonite or in AM. 4. PPI or [id number] for GI protection. 5.  CT surgical evaluation for ZACARIAS. 6. Staged PCI to LCX after the ZACARIAS.  Prepared and signed by  Clay Cary M.D.  Signed 06/05/2020 11:03:54    < end of copied text >      TTE / WOJCIECH:  none

## 2020-06-09 NOTE — CONSULT NOTE ADULT - ATTENDING COMMENTS
The patient is a vertical 80-year-old gentleman suffering from severe aortic valve stenosis. With his advanced age, surgical aortic valve replacement is out of the question. The patient is an ideal candidate for transcatheter aortic valve replacement. The transcatheter procedure was discussed in detail the patient. CT angiogram, carotid duplex, and repeat echocardiogram will he obtained here at [facility]. Once his workup is completed, all studies will be personally reviewed.    Risks benefits and alternatives to transcatheter aortic valve placement were discussed with the patient in detail.  Risks discussed included, but not limited to infection, bleeding, myocardial infarction, cerebrovascular accident, renal failure,  vascular injury requiring intervention, cardiac rupture, and death.  In addition, a roughly 5-10% risk of permanent pacemaker requirement was highlighted.   Furthermore, the patient's STS score and its significance were discussed directly with the patient and family.  I would like to thank you for referring this patient to my attention and for allowing me to participate in his care.  If there are any further questions, or I can be of any further assistance, please do not hesitate contacting me at any time.

## 2020-06-12 PROBLEM — I34.0 NONRHEUMATIC MITRAL (VALVE) INSUFFICIENCY: Chronic | Status: ACTIVE | Noted: 2020-06-04

## 2020-06-12 PROBLEM — E78.5 HYPERLIPIDEMIA, UNSPECIFIED: Chronic | Status: ACTIVE | Noted: 2020-06-04

## 2020-06-12 PROBLEM — Z87.891 PERSONAL HISTORY OF NICOTINE DEPENDENCE: Chronic | Status: ACTIVE | Noted: 2020-06-04

## 2020-06-12 PROBLEM — I35.0 NONRHEUMATIC AORTIC (VALVE) STENOSIS: Chronic | Status: ACTIVE | Noted: 2020-06-04

## 2020-06-12 PROBLEM — I48.91 UNSPECIFIED ATRIAL FIBRILLATION: Chronic | Status: ACTIVE | Noted: 2020-06-04

## 2020-06-12 PROBLEM — R00.1 BRADYCARDIA, UNSPECIFIED: Chronic | Status: ACTIVE | Noted: 2020-06-05

## 2020-06-12 PROBLEM — I10 ESSENTIAL (PRIMARY) HYPERTENSION: Chronic | Status: ACTIVE | Noted: 2020-06-04

## 2020-06-18 ENCOUNTER — RESULT REVIEW (OUTPATIENT)
Age: 85
End: 2020-06-18

## 2020-06-18 ENCOUNTER — OUTPATIENT (OUTPATIENT)
Dept: OUTPATIENT SERVICES | Facility: HOSPITAL | Age: 85
LOS: 1 days | End: 2020-06-18
Payer: MEDICARE

## 2020-06-18 DIAGNOSIS — I35.0 NONRHEUMATIC AORTIC (VALVE) STENOSIS: ICD-10-CM

## 2020-06-18 DIAGNOSIS — Z98.890 OTHER SPECIFIED POSTPROCEDURAL STATES: Chronic | ICD-10-CM

## 2020-06-18 LAB
BUN SERPL-MCNC: 19 MG/DL — SIGNIFICANT CHANGE UP (ref 8–20)
CREAT SERPL-MCNC: 1.13 MG/DL — SIGNIFICANT CHANGE UP (ref 0.5–1.3)

## 2020-06-18 PROCEDURE — 82565 ASSAY OF CREATININE: CPT

## 2020-06-18 PROCEDURE — 93306 TTE W/DOPPLER COMPLETE: CPT

## 2020-06-18 PROCEDURE — 93306 TTE W/DOPPLER COMPLETE: CPT | Mod: 26

## 2020-06-18 PROCEDURE — 74174 CTA ABD&PLVS W/CONTRAST: CPT

## 2020-06-18 PROCEDURE — 74174 CTA ABD&PLVS W/CONTRAST: CPT | Mod: 26

## 2020-06-18 PROCEDURE — 36415 COLL VENOUS BLD VENIPUNCTURE: CPT

## 2020-06-18 PROCEDURE — 84520 ASSAY OF UREA NITROGEN: CPT

## 2020-06-18 PROCEDURE — 71275 CT ANGIOGRAPHY CHEST: CPT

## 2020-06-18 PROCEDURE — 71275 CT ANGIOGRAPHY CHEST: CPT | Mod: 26

## 2020-06-30 DIAGNOSIS — Z86.79 PERSONAL HISTORY OF OTHER DISEASES OF THE CIRCULATORY SYSTEM: ICD-10-CM

## 2020-06-30 DIAGNOSIS — Z87.891 PERSONAL HISTORY OF NICOTINE DEPENDENCE: ICD-10-CM

## 2020-06-30 DIAGNOSIS — Z86.39 PERSONAL HISTORY OF OTHER ENDOCRINE, NUTRITIONAL AND METABOLIC DISEASE: ICD-10-CM

## 2020-06-30 DIAGNOSIS — I34.0 NONRHEUMATIC MITRAL (VALVE) INSUFFICIENCY: ICD-10-CM

## 2020-07-01 ENCOUNTER — APPOINTMENT (OUTPATIENT)
Dept: CARDIOTHORACIC SURGERY | Facility: CLINIC | Age: 85
End: 2020-07-01
Payer: MEDICARE

## 2020-07-01 VITALS
HEART RATE: 69 BPM | SYSTOLIC BLOOD PRESSURE: 163 MMHG | RESPIRATION RATE: 15 BRPM | OXYGEN SATURATION: 95 % | TEMPERATURE: 97.8 F | WEIGHT: 119.7 LBS | BODY MASS INDEX: 20.43 KG/M2 | HEIGHT: 64 IN | DIASTOLIC BLOOD PRESSURE: 73 MMHG

## 2020-07-01 DIAGNOSIS — Z82.49 FAMILY HISTORY OF ISCHEMIC HEART DISEASE AND OTHER DISEASES OF THE CIRCULATORY SYSTEM: ICD-10-CM

## 2020-07-01 DIAGNOSIS — Z80.1 FAMILY HISTORY OF MALIGNANT NEOPLASM OF TRACHEA, BRONCHUS AND LUNG: ICD-10-CM

## 2020-07-01 PROCEDURE — 99215 OFFICE O/P EST HI 40 MIN: CPT

## 2020-07-01 RX ORDER — FINASTERIDE 5 MG/1
5 TABLET, FILM COATED ORAL
Refills: 0 | Status: ACTIVE | COMMUNITY

## 2020-07-01 RX ORDER — TAMSULOSIN HYDROCHLORIDE 0.4 MG/1
0.4 CAPSULE ORAL
Refills: 0 | Status: ACTIVE | COMMUNITY

## 2020-07-01 RX ORDER — EZETIMIBE 10 MG/1
10 TABLET ORAL
Refills: 0 | Status: ACTIVE | COMMUNITY

## 2020-07-01 RX ORDER — ROSUVASTATIN CALCIUM 40 MG/1
40 TABLET, FILM COATED ORAL
Refills: 0 | Status: ACTIVE | COMMUNITY

## 2020-07-01 NOTE — REVIEW OF SYSTEMS
[SOB on Exertion] : shortness of breath during exertion [Feeling Poorly] : not feeling poorly [Feeling Tired] : not feeling tired [Eyesight Problems] : no eyesight problems [Discharge From Eyes] : no purulent discharge from the eyes [Nosebleeds] : no nosebleeds [Nasal Discharge] : no nasal discharge [Chest Pain] : no chest pain [Palpitations] : no palpitations [Wheezing] : no wheezing [Cough] : no cough [Constipation] : no constipation [Diarrhea] : no diarrhea [Hesitancy] : no urinary hesitancy [Nocturia] : no nocturia [Joint Swelling] : no joint swelling [Joint Stiffness] : no joint stiffness [Itching] : no itching [Change In A Mole] : no change in a mole [Dizziness] : no dizziness [Anxiety] : no anxiety [Depression] : no depression [Muscle Weakness] : no muscle weakness [Erectile Dysfunction] : no erectile dysfunction [Swollen Glands] : no swollen glands [Swollen Glands In The Neck] : no swollen glands in the neck

## 2020-07-01 NOTE — ASSESSMENT
[FreeTextEntry1] : The patient is a very +88-year-old gentleman. His cardiovascular risk factors include hypertension, hyperlipidemia, chronic atrial fibrillation and advanced age. Cardiac catheterization on June 18 revealed calcific multivessel coronary disease. He is status post Rotablator/stenting of the LAD. He is on aspirin and Plavix status post PCI, an Eliquis for the Afib.  Patient reports progressively worsening exertional dyspnea. The daughter reports that she has noticed a progressive slowing down over the past 6 months. Echocardiogram confirms severe aortic valve stenosis. Peak and mean gradients are 83.4 and 40.8mmHg respectively. CT angiogram has been reviewed. 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 The anatomy is amenable to a 23 mm S3 transcatheter heart valve with femoral access. The transcatheter procedure was discussed in detail with the patient and family.\par \par Risks benefits and alternatives to transcatheter aortic valve placement were discussed with the patient in detail.  Risks discussed included, but not limited to infection, bleeding, myocardial infarction, cerebrovascular accident, renal failure,  vascular injury requiring intervention, cardiac rupture, and death.  In addition, a roughly 5-10% risk of permanent pacemaker requirement was highlighted.   Furthermore, the patient's STS score and its significance were discussed directly with the patient and family.\par I would like to thank you for referring this patient to my attention and for allowing me to participate in his care.  If there are any further questions, or I can be of any further assistance, please do not hesitate contacting me at any time.\par \par Written by Bishop Yun NP acting as a scribe for par “The documentation recorded by the scribe accurately reflects the service I personally performed and the decisions made by me.” \trav Carney MD.\par

## 2020-07-01 NOTE — CONSULT LETTER
[Dear  ___] : Dear  [unfilled], [Courtesy Letter:] : I had the pleasure of seeing your patient, [unfilled], in my office today. [Please see my note below.] : Please see my note below. [Referral Closing:] : Thank you very much for seeing this patient.  If you have any questions, please do not hesitate to contact me. [Sincerely,] : Sincerely, [FreeTextEntry2] : Dr Lake\par       [address]\[place] [address]\par   p- [phone]\par   f - [phone] [FreeTextEntry3] : Ravi Carney MD\par Chief of Cardiovascular & Thoracic Surgery\par System Director of Endovascular & Cardiovascular Surgery\par \par Cardiovascular & Thoracic Surgery\par [facility] [employer]\par \par [facility] \par [address] \[place] [address]\par Tel   [phone]\par Fax  [phone]\par

## 2020-07-01 NOTE — HISTORY OF PRESENT ILLNESS
[FreeTextEntry1] : Mr. Whitman is an 88 year old male former smoker referred by Dr. Cary with Past Medical History pertinent for HTN, HLD, A Fib (on Eliquis), Stents (06/18/2020), MI, and Aortic Stenosis. \par \par Recent Transthoracic Echo on 6/18/2020 demonstrated Severe AS, pGr 83.4 mmHg, mGr 40.8 mmHg, PAOLO 0.75 cm2, AoV 4.57 m/s and LVEF 63%. Patient referred for consultation for consideration for TAVR.\par \par Today he reports feeling well besides shortness of breath on exertion that has gotten worse over the past year. Denies any chest pain, dizziness, palpitations, shortness of breath, syncopal episode, lower extremity edema, fevers, chill, or other related symptoms. \par \par TTE and Cath personally reviewed.\par \par The patient's past medical history, past surgical history, family history, social history, allergies, medications, and multisystem review of systems were individually reviewed with the patient.  There are no pertinent additions or subtractions.  The patient was personally seen and examined.\par \par

## 2020-07-01 NOTE — DATA REVIEWED
[FreeTextEntry1] : CTA performed on 06/18/2020 revealed \par aortic measurements as described \par small amount of deep pelvic ascites of unclear etiology. \par \par Recent Transthoracic Echo on 6/18/2020 demonstrated Severe AS, pGr 83.4 mmHg, mGr 40.8 mmHg, PAOLO 0.75 cm2, AoV 4.57 m/s and LVEF 63%. Patient referred for consultation for consideration for TAVR\par \par Cath performed at [facility] on 06/18/2020\par Mid LAD 95% stented\par proximal circumflex 90% after TAVR \par ostial %

## 2020-07-12 ENCOUNTER — APPOINTMENT (OUTPATIENT)
Dept: DISASTER EMERGENCY | Facility: CLINIC | Age: 85
End: 2020-07-12

## 2020-07-13 LAB — SARS-COV-2 N GENE NPH QL NAA+PROBE: NOT DETECTED

## 2020-07-15 ENCOUNTER — RESULT REVIEW (OUTPATIENT)
Age: 85
End: 2020-07-15

## 2020-07-15 ENCOUNTER — OUTPATIENT (OUTPATIENT)
Dept: OUTPATIENT SERVICES | Facility: HOSPITAL | Age: 85
LOS: 1 days | End: 2020-07-15
Payer: MEDICARE

## 2020-07-15 ENCOUNTER — APPOINTMENT (OUTPATIENT)
Dept: PULMONOLOGY | Facility: CLINIC | Age: 85
End: 2020-07-15
Payer: MEDICARE

## 2020-07-15 VITALS
SYSTOLIC BLOOD PRESSURE: 132 MMHG | RESPIRATION RATE: 18 BRPM | HEART RATE: 63 BPM | WEIGHT: 119.05 LBS | HEIGHT: 62 IN | TEMPERATURE: 97 F | DIASTOLIC BLOOD PRESSURE: 75 MMHG

## 2020-07-15 DIAGNOSIS — Z98.49 CATARACT EXTRACTION STATUS, UNSPECIFIED EYE: Chronic | ICD-10-CM

## 2020-07-15 DIAGNOSIS — I25.10 ATHEROSCLEROTIC HEART DISEASE OF NATIVE CORONARY ARTERY WITHOUT ANGINA PECTORIS: ICD-10-CM

## 2020-07-15 DIAGNOSIS — Z01.818 ENCOUNTER FOR OTHER PREPROCEDURAL EXAMINATION: ICD-10-CM

## 2020-07-15 DIAGNOSIS — I35.0 NONRHEUMATIC AORTIC (VALVE) STENOSIS: ICD-10-CM

## 2020-07-15 DIAGNOSIS — Z95.5 PRESENCE OF CORONARY ANGIOPLASTY IMPLANT AND GRAFT: ICD-10-CM

## 2020-07-15 DIAGNOSIS — Z29.9 ENCOUNTER FOR PROPHYLACTIC MEASURES, UNSPECIFIED: ICD-10-CM

## 2020-07-15 DIAGNOSIS — I10 ESSENTIAL (PRIMARY) HYPERTENSION: ICD-10-CM

## 2020-07-15 DIAGNOSIS — Z98.890 OTHER SPECIFIED POSTPROCEDURAL STATES: Chronic | ICD-10-CM

## 2020-07-15 DIAGNOSIS — I48.91 UNSPECIFIED ATRIAL FIBRILLATION: ICD-10-CM

## 2020-07-15 LAB
A1C WITH ESTIMATED AVERAGE GLUCOSE RESULT: 6.3 % — HIGH (ref 4–5.6)
ALBUMIN SERPL ELPH-MCNC: 4.1 G/DL — SIGNIFICANT CHANGE UP (ref 3.3–5.2)
ALP SERPL-CCNC: 64 U/L — SIGNIFICANT CHANGE UP (ref 40–120)
ALT FLD-CCNC: 46 U/L — HIGH
ANION GAP SERPL CALC-SCNC: 11 MMOL/L — SIGNIFICANT CHANGE UP (ref 5–17)
APPEARANCE UR: CLEAR — SIGNIFICANT CHANGE UP
APTT BLD: 31.7 SEC — SIGNIFICANT CHANGE UP (ref 27.5–35.5)
AST SERPL-CCNC: 52 U/L — HIGH
BASOPHILS # BLD AUTO: 0.02 K/UL — SIGNIFICANT CHANGE UP (ref 0–0.2)
BASOPHILS NFR BLD AUTO: 0.3 % — SIGNIFICANT CHANGE UP (ref 0–2)
BILIRUB SERPL-MCNC: 0.8 MG/DL — SIGNIFICANT CHANGE UP (ref 0.4–2)
BILIRUB UR-MCNC: NEGATIVE — SIGNIFICANT CHANGE UP
BUN SERPL-MCNC: 23 MG/DL — HIGH (ref 8–20)
CALCIUM SERPL-MCNC: 9.6 MG/DL — SIGNIFICANT CHANGE UP (ref 8.6–10.2)
CHLORIDE SERPL-SCNC: 100 MMOL/L — SIGNIFICANT CHANGE UP (ref 98–107)
CO2 SERPL-SCNC: 26 MMOL/L — SIGNIFICANT CHANGE UP (ref 22–29)
COLOR SPEC: YELLOW — SIGNIFICANT CHANGE UP
CREAT SERPL-MCNC: 1.18 MG/DL — SIGNIFICANT CHANGE UP (ref 0.5–1.3)
DIFF PNL FLD: ABNORMAL
EOSINOPHIL # BLD AUTO: 0.02 K/UL — SIGNIFICANT CHANGE UP (ref 0–0.5)
EOSINOPHIL NFR BLD AUTO: 0.3 % — SIGNIFICANT CHANGE UP (ref 0–6)
ESTIMATED AVERAGE GLUCOSE: 134 MG/DL — HIGH (ref 68–114)
GLUCOSE SERPL-MCNC: 102 MG/DL — HIGH (ref 70–99)
GLUCOSE UR QL: NEGATIVE MG/DL — SIGNIFICANT CHANGE UP
HCT VFR BLD CALC: 45.9 % — SIGNIFICANT CHANGE UP (ref 39–50)
HGB BLD-MCNC: 14.7 G/DL — SIGNIFICANT CHANGE UP (ref 13–17)
IMM GRANULOCYTES NFR BLD AUTO: 0.2 % — SIGNIFICANT CHANGE UP (ref 0–1.5)
INR BLD: 1.41 RATIO — HIGH (ref 0.88–1.16)
KETONES UR-MCNC: NEGATIVE — SIGNIFICANT CHANGE UP
LEUKOCYTE ESTERASE UR-ACNC: NEGATIVE — SIGNIFICANT CHANGE UP
LYMPHOCYTES # BLD AUTO: 0.8 K/UL — LOW (ref 1–3.3)
LYMPHOCYTES # BLD AUTO: 13.3 % — SIGNIFICANT CHANGE UP (ref 13–44)
MCHC RBC-ENTMCNC: 29.9 PG — SIGNIFICANT CHANGE UP (ref 27–34)
MCHC RBC-ENTMCNC: 32 GM/DL — SIGNIFICANT CHANGE UP (ref 32–36)
MCV RBC AUTO: 93.5 FL — SIGNIFICANT CHANGE UP (ref 80–100)
MONOCYTES # BLD AUTO: 0.49 K/UL — SIGNIFICANT CHANGE UP (ref 0–0.9)
MONOCYTES NFR BLD AUTO: 8.1 % — SIGNIFICANT CHANGE UP (ref 2–14)
MRSA PCR RESULT.: SIGNIFICANT CHANGE UP
NEUTROPHILS # BLD AUTO: 4.69 K/UL — SIGNIFICANT CHANGE UP (ref 1.8–7.4)
NEUTROPHILS NFR BLD AUTO: 77.8 % — HIGH (ref 43–77)
NITRITE UR-MCNC: NEGATIVE — SIGNIFICANT CHANGE UP
NT-PROBNP SERPL-SCNC: 1816 PG/ML — HIGH (ref 0–300)
PH UR: 5 — SIGNIFICANT CHANGE UP (ref 5–8)
PLATELET # BLD AUTO: 197 K/UL — SIGNIFICANT CHANGE UP (ref 150–400)
POTASSIUM SERPL-MCNC: 4 MMOL/L — SIGNIFICANT CHANGE UP (ref 3.5–5.3)
POTASSIUM SERPL-SCNC: 4 MMOL/L — SIGNIFICANT CHANGE UP (ref 3.5–5.3)
PREALB SERPL-MCNC: 20 MG/DL — SIGNIFICANT CHANGE UP (ref 18–38)
PROT SERPL-MCNC: 7.3 G/DL — SIGNIFICANT CHANGE UP (ref 6.6–8.7)
PROT UR-MCNC: 30 MG/DL
PROTHROM AB SERPL-ACNC: 16.1 SEC — HIGH (ref 10.6–13.6)
RBC # BLD: 4.91 M/UL — SIGNIFICANT CHANGE UP (ref 4.2–5.8)
RBC # FLD: 15.8 % — HIGH (ref 10.3–14.5)
S AUREUS DNA NOSE QL NAA+PROBE: DETECTED
SODIUM SERPL-SCNC: 136 MMOL/L — SIGNIFICANT CHANGE UP (ref 135–145)
SP GR SPEC: 1.02 — SIGNIFICANT CHANGE UP (ref 1.01–1.02)
T3 SERPL-MCNC: 87 NG/DL — SIGNIFICANT CHANGE UP (ref 80–200)
T4 AB SER-ACNC: 7.6 UG/DL — SIGNIFICANT CHANGE UP (ref 4.5–12)
TSH SERPL-MCNC: 2.28 UIU/ML — SIGNIFICANT CHANGE UP (ref 0.27–4.2)
UROBILINOGEN FLD QL: NEGATIVE MG/DL — SIGNIFICANT CHANGE UP
WBC # BLD: 6.03 K/UL — SIGNIFICANT CHANGE UP (ref 3.8–10.5)
WBC # FLD AUTO: 6.03 K/UL — SIGNIFICANT CHANGE UP (ref 3.8–10.5)

## 2020-07-15 PROCEDURE — G0463: CPT

## 2020-07-15 PROCEDURE — 94727 GAS DIL/WSHOT DETER LNG VOL: CPT

## 2020-07-15 PROCEDURE — 71046 X-RAY EXAM CHEST 2 VIEWS: CPT | Mod: 26

## 2020-07-15 PROCEDURE — 93005 ELECTROCARDIOGRAM TRACING: CPT

## 2020-07-15 PROCEDURE — 85018 HEMOGLOBIN: CPT | Mod: QW

## 2020-07-15 PROCEDURE — 93010 ELECTROCARDIOGRAM REPORT: CPT

## 2020-07-15 PROCEDURE — 71046 X-RAY EXAM CHEST 2 VIEWS: CPT

## 2020-07-15 PROCEDURE — 94729 DIFFUSING CAPACITY: CPT

## 2020-07-15 PROCEDURE — 94010 BREATHING CAPACITY TEST: CPT

## 2020-07-15 NOTE — H&P PST ADULT - NSICDXPROBLEM_GEN_ALL_CORE_FT
PROBLEM DIAGNOSES  Problem: Need for prophylactic measure  Assessment and Plan: Caprini Score "6"    Problem: Severe aortic stenosis  Assessment and Plan: TAVR scheduled 7/24/2020    Problem: Presence of coronary angioplasty implant and graft  Assessment and Plan: Continue DAPT     Problem: AF (atrial fibrillation)  Assessment and Plan: Rate controlled, Eliquis BID-will hold three days prior to surgery    Problem: Coronary artery disease  Assessment and Plan: s/p PCI June 2020    Problem: HTN (hypertension)  Assessment and Plan: Continue medical management

## 2020-07-15 NOTE — H&P PST ADULT - NSANTHOSAYNRD_GEN_A_CORE
No. NEREIDA screening performed.  STOP BANG Legend: 0-2 = LOW Risk; 3-4 = INTERMEDIATE Risk; 5-8 = HIGH Risk

## 2020-07-15 NOTE — H&P PST ADULT - HISTORY OF PRESENT ILLNESS
88 year old male alert and oriented to time, place, situation presents accompanied by his daughter who offers additional information.    PMH significant for MI, HTN, A-fib, CAD, HLD and severe aortic stenosis. He is planning to undergo elective TAVR on July 24, 2020 with Dr. Carney.    Patient reports dyspnea on moderate exertion and fatigue which has become significantly worse over the past three weeks.    Pre operative cardiac cath. revealed obstructive CAD and three stents were placed (June 2020). The patient has residual disease in the left circumflex and is planning to have a staged intervention once the TAVR is complete.    He denies any angina, palpitations, dizziness, syncope, shortness of breath at rest, lower extremity edema, orthopnea and PND.      His daughter reports that he does snore and at times has cessation of breathing during sleep however the patient has never been tested for NEREIDA. 88 year old male alert and oriented to time, place, situation presents accompanied by his daughter who offers additional information.    PMH significant for MI, HTN, A-fib, CAD, HLD and severe aortic stenosis. He is planning to undergo elective TAVR on July 24, 2020 with Dr. Carney.    Patient reports dyspnea on moderate exertion and fatigue which has become significantly worse over the past three weeks.    Pre operative cardiac cath. revealed obstructive CAD and is now s/p PCI to the LAD (June 2020). The patient has residual disease in the left circumflex and is planning to have a staged intervention once the TAVR is complete.    He denies any angina, palpitations, dizziness, syncope, shortness of breath at rest, orthopnea and PND.      His daughter reports that he does snore and at times has cessation of breathing during sleep however the patient has never been tested for NEREIDA.

## 2020-07-15 NOTE — H&P PST ADULT - NSICDXPASTMEDICALHX_GEN_ALL_CORE_FT
PAST MEDICAL HISTORY:  AF (atrial fibrillation)     Coronary artery disease status post cardiac stents June 2020    Former tobacco use quit 1984    HTN (hypertension)     Hyperlipidemia, mild     Moderate mitral regurgitation     Severe aortic stenosis     Sinus bradycardia

## 2020-07-15 NOTE — H&P PST ADULT - ASSESSMENT
Mr. Whitman is a very pleasant, former smoking (quit in ) male with history of hypertension, hyperlipidemia, atrial fibrillation, MI, CAD status post MARE placement to the on  with residual disease to the left circumflex, and severe symptomatic aortic stenosis planning to undergo TAVR with Dr. Carney on 2020.    TADI VTE 2.0 SCORE [CLOT updated 2019]    AGE RELATED RISK FACTORS                                                       MOBILITY RELATED FACTORS  [ ] Age 41-60 years                                            (1 Point)                   [ ] Bed rest                                                        (1 Point)  [ ] Age: 61-74 years                                           (2 Points)                  [ ] Plaster cast                                                   (2 Points)  [x ] Age= 75 years                                              (3 Points)                 [ ] Bed bound for more than 72 hours                 (2 Points)    DISEASE RELATED RISK FACTORS                                               GENDER SPECIFIC FACTORS  [x ] Edema in the lower extremities                       (1 Point)              [ ] Pregnancy                                                     (1 Point)  [ ] Varicose veins                                               (1 Point)                     [ ] Post-partum < 6 weeks                                   (1 Point)             [ ] BMI > 25 Kg/m2                                            (1 Point)                     [ ] Hormonal therapy  or oral contraception          (1 Point)                 [ ] Sepsis (in the previous month)                        (1 Point)               [ ] History of pregnancy complications                 (1 point)  [ ] Pneumonia or serious lung disease                                               [ ] Unexplained or recurrent                     (1 Point)           (in the previous month)                               (1 Point)  [ ] Abnormal pulmonary function test                     (1 Point)                 SURGERY RELATED RISK FACTORS  [ ] Acute myocardial infarction                              (1 Point)               [ ]  Section                                             (1 Point)  [ ] Congestive heart failure (in the previous month)  (1 Point)      [ ] Minor surgery                                                  (1 Point)   [ ] Inflammatory bowel disease                             (1 Point)               [ ] Arthroscopic surgery                                        (2 Points)  [ ] Central venous access                                      (2 Points)                [x ] General surgery lasting more than 45 minutes (2 points)  [ ] Malignancy- Present or previous                   (2 Points)                [ ] Elective arthroplasty                                         (5 points)    [ ] Stroke (in the previous month)                          (5 Points)                                                                                                                                                           HEMATOLOGY RELATED FACTORS                                                 TRAUMA RELATED RISK FACTORS  [ ] Prior episodes of VTE                                     (3 Points)                [ ] Fracture of the hip, pelvis, or leg                       (5 Points)  [ ] Positive family history for VTE                         (3 Points)             [ ] Acute spinal cord injury (in the previous month)  (5 Points)  [ ] Prothrombin 63099 A                                     (3 Points)               [ ] Paralysis  (less than 1 month)                             (5 Points)  [ ] Factor V Leiden                                             (3 Points)                  [ ] Multiple Trauma within 1 month                        (5 Points)  [ ] Lupus anticoagulants                                     (3 Points)                                                           [ ] Anticardiolipin antibodies                               (3 Points)                                                       [ ] High homocysteine in the blood                      (3 Points)                                             [ ] Other congenital or acquired thrombophilia      (3 Points)                                                [ ] Heparin induced thrombocytopenia                  (3 Points)                                     Total Score [ 6 ]  OPIOID RISK TOOL    OLIVER EACH BOX THAT APPLIES AND ADD TOTALS AT THE END    FAMILY HISTORY OF SUBSTANCE ABUSE                 FEMALE         MALE                                                Alcohol                             [  ]1 pt          [  ]3pts                                               Illegal Drugs                     [  ]2 pts        [  ]3pts                                               Rx Drugs                           [  ]4 pts        [  ]4 pts    PERSONAL HISTORY OF SUBSTANCE ABUSE                                                                                          Alcohol                             [  ]3 pts       [  ]3 pts                                               Illegal Drugs                     [  ]4 pts        [  ]4 pts                                               Rx Drugs                           [  ]5 pts        [  ]5 pts    AGE BETWEEN 16-45 YEARS                                      [  ]1 pt         [  ]1 pt    HISTORY OF PREADOLESCENT   SEXUAL ABUSE                                                             [  ]3 pts        [  ]0pts    PSYCHOLOGICAL DISEASE                     ADD, OCD, Bipolar, Schizophrenia        [  ]2 pts         [  ]2 pts                      Depression                                        [  ]1 pt           [  ]1 pt           SCORING TOTAL   (add numbers and type here)              ( 0 )                                     A score of 3 or lower indicated LOW risk for future opioid abuse  A score of 4 to 7 indicated moderate risk for future opioid abuse  A score of 8 or higher indicates a high risk for opioid abuse

## 2020-07-15 NOTE — ASU PATIENT PROFILE, ADULT - TEACHING/LEARNING CULTURAL CONSIDERATIONS
PRE-SEDATION ASSESSMENT    CONSENT  Consent for procedure and sedation obtained: Yes    MEDICAL HISTORY  Significant medical/surgical history: Yes  Past Complications with Sedation/Anesthesia: No  Significant Family History: No  Smoking History: No  Alcohol/Drug abuse: No  Possible Pregnancy (LMP): Not Applicable  Cardiac History: Yes  Respiratory History: Yes    PHYSICAL EXAM  History and Physical Reviewed: H&P completed today  Airway Risk History: No previous complications  Airway Anatomy : Class II  Heart : Normal  Lungs : Abnormal  LOC/Mental Status : Normal    OTHER FINDINGS  Reviewed current medications and allergies: Yes  Pertinent lab/diagnostic test reviewed: Yes    SEDATION RISK ASSESSMENT  Risk Status ASA: Class II - Normal patient with mild systemic disease  Plan for Sedation: Moderate Sedation  Indications for Procedure/Pre-Procedure Diagnosis and Planned Procedure: Anemia, Weight loss  EKG Monitoring: Yes    NARRATIVE FINDINGS     
PRE-SEDATION ASSESSMENT    CONSENT  Consent for procedure and sedation obtained: Yes    MEDICAL HISTORY  Significant medical/surgical history: Yes  Past Complications with Sedation/Anesthesia: No  Significant Family History: No  Smoking History: Yes  Alcohol/Drug abuse: No  Possible Pregnancy (LMP): Not Applicable  Cardiac History: No  Respiratory History: Yes    PHYSICAL EXAM  History and Physical Reviewed: Patient has valid H&P within 30 days. I have reviewed and there are no changes.  Airway Risk History: No previous complications  Airway Anatomy : Class II  Heart : Normal  Lungs : Abnormal  LOC/Mental Status : Normal    OTHER FINDINGS  Reviewed current medications and allergies: Yes  Pertinent lab/diagnostic test reviewed: Yes    SEDATION RISK ASSESSMENT  Risk Status ASA: Class IV - Incapacitating systemic disease that is a constant threat to life  Plan for Sedation: Moderate Sedation  Indications for Procedure/Pre-Procedure Diagnosis and Planned Procedure: right hydronephrosis  EKG Monitoring: No    NARRATIVE FINDINGS     
PRE-SEDATION ASSESSMENT    PROCEDURAL PRE-SEDATION ASSESSMENT       Procedure date:8/24/17     Indications for Procedure/Preop Diagnosis:Left lung cavitary lesion     Planned Procedure: Bronchoscopy ,     MEDICAL HISTORY (Patient history need not be repeated if valid H&P is present)     Significant medical/surgical history:Pneumonia  Significant family history: NO  Smoking history: Yes  Alcohol/drug abuse:NO  Possible pregnancy (LMP): NO  Airway risk history (sleep apnea, stridor, snoring, neck arthritis):No  Cardiac history: NO  Respiratory history: NO     PHYSICAL EXAM  Heart (if no H&P): NORMAL findings  Lungs: (if no H&P):  Mario crepitations coarse.  Mental status: NORMAL findings     OTHER FINDINGS  Reviewed current medications and allergies: YES  Pertinent lab/diagnostic tests reviewed: YES     RISK STATUS: ASA: 3     Airway Assessment: Malampatti 3     PLAN FOR SEDATION:   Conscious sedation  EKG Monitoring (Required when deep sedation is intended.  Recommended for patients with significant cardiovascular disease or when dysrhythmias are anticipated): NO  REASSESSMENT IMMEDIATELY PRIOR TO PROCEDURE: Patient remains a candidate for the planned sedation and procedure     Assessing Physician: Jake Kahn MD                          Time:10:00AM      
none

## 2020-07-16 DIAGNOSIS — A49.01 METHICILLIN SUSCEPTIBLE STAPHYLOCOCCUS AUREUS INFECTION, UNSPECIFIED SITE: ICD-10-CM

## 2020-07-16 LAB
CULTURE RESULTS: SIGNIFICANT CHANGE UP
SPECIMEN SOURCE: SIGNIFICANT CHANGE UP

## 2020-07-21 ENCOUNTER — APPOINTMENT (OUTPATIENT)
Dept: DISASTER EMERGENCY | Facility: CLINIC | Age: 85
End: 2020-07-21

## 2020-07-22 LAB — SARS-COV-2 N GENE NPH QL NAA+PROBE: NOT DETECTED

## 2020-07-23 ENCOUNTER — TRANSCRIPTION ENCOUNTER (OUTPATIENT)
Age: 85
End: 2020-07-23

## 2020-07-24 ENCOUNTER — INPATIENT (INPATIENT)
Facility: HOSPITAL | Age: 85
LOS: 6 days | Discharge: HOME | DRG: 266 | End: 2020-07-31
Attending: THORACIC SURGERY (CARDIOTHORACIC VASCULAR SURGERY) | Admitting: THORACIC SURGERY (CARDIOTHORACIC VASCULAR SURGERY)
Payer: MEDICARE

## 2020-07-24 ENCOUNTER — APPOINTMENT (OUTPATIENT)
Dept: CARDIOTHORACIC SURGERY | Facility: HOSPITAL | Age: 85
End: 2020-07-24

## 2020-07-24 VITALS
RESPIRATION RATE: 16 BRPM | DIASTOLIC BLOOD PRESSURE: 49 MMHG | SYSTOLIC BLOOD PRESSURE: 142 MMHG | HEIGHT: 64 IN | WEIGHT: 114.64 LBS | TEMPERATURE: 97 F | OXYGEN SATURATION: 100 % | HEART RATE: 58 BPM

## 2020-07-24 DIAGNOSIS — I35.0 NONRHEUMATIC AORTIC (VALVE) STENOSIS: ICD-10-CM

## 2020-07-24 DIAGNOSIS — Z98.890 OTHER SPECIFIED POSTPROCEDURAL STATES: Chronic | ICD-10-CM

## 2020-07-24 DIAGNOSIS — Z98.49 CATARACT EXTRACTION STATUS, UNSPECIFIED EYE: Chronic | ICD-10-CM

## 2020-07-24 LAB
ABO RH CONFIRMATION: SIGNIFICANT CHANGE UP
ALBUMIN SERPL ELPH-MCNC: 3.5 G/DL — SIGNIFICANT CHANGE UP (ref 3.3–5.2)
ALP SERPL-CCNC: 52 U/L — SIGNIFICANT CHANGE UP (ref 40–120)
ALT FLD-CCNC: 69 U/L — HIGH
ANION GAP SERPL CALC-SCNC: 15 MMOL/L — SIGNIFICANT CHANGE UP (ref 5–17)
APTT BLD: 28.4 SEC — SIGNIFICANT CHANGE UP (ref 27.5–35.5)
AST SERPL-CCNC: 68 U/L — HIGH
BILIRUB DIRECT SERPL-MCNC: 0.3 MG/DL — SIGNIFICANT CHANGE UP (ref 0–0.3)
BILIRUB INDIRECT FLD-MCNC: 0.5 MG/DL — SIGNIFICANT CHANGE UP (ref 0.2–1)
BILIRUB SERPL-MCNC: 0.8 MG/DL — SIGNIFICANT CHANGE UP (ref 0.4–2)
BUN SERPL-MCNC: 25 MG/DL — HIGH (ref 8–20)
CALCIUM SERPL-MCNC: 10.8 MG/DL — HIGH (ref 8.6–10.2)
CHLORIDE SERPL-SCNC: 99 MMOL/L — SIGNIFICANT CHANGE UP (ref 98–107)
CO2 SERPL-SCNC: 20 MMOL/L — LOW (ref 22–29)
CREAT SERPL-MCNC: 1.12 MG/DL — SIGNIFICANT CHANGE UP (ref 0.5–1.3)
GAS PNL BLDA: SIGNIFICANT CHANGE UP
GLUCOSE SERPL-MCNC: 104 MG/DL — HIGH (ref 70–99)
HCT VFR BLD CALC: 38.2 % — LOW (ref 39–50)
HGB BLD-MCNC: 12.2 G/DL — LOW (ref 13–17)
INR BLD: 1.36 RATIO — HIGH (ref 0.88–1.16)
MAGNESIUM SERPL-MCNC: 1.9 MG/DL — SIGNIFICANT CHANGE UP (ref 1.6–2.6)
MCHC RBC-ENTMCNC: 30 PG — SIGNIFICANT CHANGE UP (ref 27–34)
MCHC RBC-ENTMCNC: 31.9 GM/DL — LOW (ref 32–36)
MCV RBC AUTO: 93.9 FL — SIGNIFICANT CHANGE UP (ref 80–100)
PHOSPHATE SERPL-MCNC: 3.7 MG/DL — SIGNIFICANT CHANGE UP (ref 2.4–4.7)
PLATELET # BLD AUTO: 132 K/UL — LOW (ref 150–400)
POTASSIUM SERPL-MCNC: 3.8 MMOL/L — SIGNIFICANT CHANGE UP (ref 3.5–5.3)
POTASSIUM SERPL-SCNC: 3.8 MMOL/L — SIGNIFICANT CHANGE UP (ref 3.5–5.3)
PROT SERPL-MCNC: 5.9 G/DL — LOW (ref 6.6–8.7)
PROTHROM AB SERPL-ACNC: 15.6 SEC — HIGH (ref 10.6–13.6)
RBC # BLD: 4.07 M/UL — LOW (ref 4.2–5.8)
RBC # FLD: 15.5 % — HIGH (ref 10.3–14.5)
SODIUM SERPL-SCNC: 134 MMOL/L — LOW (ref 135–145)
WBC # BLD: 7.64 K/UL — SIGNIFICANT CHANGE UP (ref 3.8–10.5)
WBC # FLD AUTO: 7.64 K/UL — SIGNIFICANT CHANGE UP (ref 3.8–10.5)

## 2020-07-24 PROCEDURE — 99223 1ST HOSP IP/OBS HIGH 75: CPT

## 2020-07-24 PROCEDURE — 93010 ELECTROCARDIOGRAM REPORT: CPT

## 2020-07-24 PROCEDURE — 93306 TTE W/DOPPLER COMPLETE: CPT | Mod: 26

## 2020-07-24 PROCEDURE — 71045 X-RAY EXAM CHEST 1 VIEW: CPT | Mod: 26

## 2020-07-24 PROCEDURE — 33362 REPLACE AORTIC VALVE OPEN: CPT | Mod: Q0,62

## 2020-07-24 RX ORDER — ROSUVASTATIN CALCIUM 5 MG/1
1 TABLET ORAL
Qty: 0 | Refills: 0 | DISCHARGE

## 2020-07-24 RX ORDER — MAGNESIUM SULFATE 500 MG/ML
1 VIAL (ML) INJECTION ONCE
Refills: 0 | Status: COMPLETED | OUTPATIENT
Start: 2020-07-24 | End: 2020-07-24

## 2020-07-24 RX ORDER — CLOPIDOGREL BISULFATE 75 MG/1
75 TABLET, FILM COATED ORAL DAILY
Refills: 0 | Status: DISCONTINUED | OUTPATIENT
Start: 2020-07-24 | End: 2020-07-31

## 2020-07-24 RX ORDER — BENZOCAINE AND MENTHOL 5; 1 G/100ML; G/100ML
1 LIQUID ORAL EVERY 4 HOURS
Refills: 0 | Status: DISCONTINUED | OUTPATIENT
Start: 2020-07-24 | End: 2020-07-24

## 2020-07-24 RX ORDER — CEFUROXIME AXETIL 250 MG
1500 TABLET ORAL EVERY 8 HOURS
Refills: 0 | Status: COMPLETED | OUTPATIENT
Start: 2020-07-24 | End: 2020-07-25

## 2020-07-24 RX ORDER — FINASTERIDE 5 MG/1
1 TABLET, FILM COATED ORAL
Qty: 0 | Refills: 0 | DISCHARGE

## 2020-07-24 RX ORDER — EZETIMIBE 10 MG/1
1 TABLET ORAL
Qty: 0 | Refills: 0 | DISCHARGE

## 2020-07-24 RX ORDER — LIDOCAINE HCL 20 MG/ML
15 VIAL (ML) INJECTION ONCE
Refills: 0 | Status: DISCONTINUED | OUTPATIENT
Start: 2020-07-24 | End: 2020-07-26

## 2020-07-24 RX ORDER — POTASSIUM CHLORIDE 20 MEQ
10 PACKET (EA) ORAL ONCE
Refills: 0 | Status: COMPLETED | OUTPATIENT
Start: 2020-07-24 | End: 2020-07-24

## 2020-07-24 RX ORDER — ASPIRIN/CALCIUM CARB/MAGNESIUM 324 MG
300 TABLET ORAL ONCE
Refills: 0 | Status: COMPLETED | OUTPATIENT
Start: 2020-07-24 | End: 2020-07-24

## 2020-07-24 RX ORDER — MAGNESIUM SULFATE 500 MG/ML
2 VIAL (ML) INJECTION ONCE
Refills: 0 | Status: COMPLETED | OUTPATIENT
Start: 2020-07-24 | End: 2020-07-24

## 2020-07-24 RX ORDER — CEFUROXIME AXETIL 250 MG
1500 TABLET ORAL ONCE
Refills: 0 | Status: COMPLETED | OUTPATIENT
Start: 2020-07-24 | End: 2020-07-24

## 2020-07-24 RX ORDER — ASPIRIN/CALCIUM CARB/MAGNESIUM 324 MG
81 TABLET ORAL DAILY
Refills: 0 | Status: DISCONTINUED | OUTPATIENT
Start: 2020-07-24 | End: 2020-07-31

## 2020-07-24 RX ORDER — BENZOCAINE 10 %
1 GEL (GRAM) MUCOUS MEMBRANE ONCE
Refills: 0 | Status: COMPLETED | OUTPATIENT
Start: 2020-07-24 | End: 2020-07-24

## 2020-07-24 RX ORDER — ATORVASTATIN CALCIUM 80 MG/1
80 TABLET, FILM COATED ORAL AT BEDTIME
Refills: 0 | Status: DISCONTINUED | OUTPATIENT
Start: 2020-07-24 | End: 2020-07-31

## 2020-07-24 RX ORDER — POTASSIUM CHLORIDE 20 MEQ
20 PACKET (EA) ORAL ONCE
Refills: 0 | Status: DISCONTINUED | OUTPATIENT
Start: 2020-07-24 | End: 2020-07-24

## 2020-07-24 RX ORDER — TAMSULOSIN HYDROCHLORIDE 0.4 MG/1
0.4 CAPSULE ORAL AT BEDTIME
Refills: 0 | Status: DISCONTINUED | OUTPATIENT
Start: 2020-07-24 | End: 2020-07-31

## 2020-07-24 RX ORDER — TAMSULOSIN HYDROCHLORIDE 0.4 MG/1
1 CAPSULE ORAL
Qty: 0 | Refills: 0 | DISCHARGE

## 2020-07-24 RX ORDER — SODIUM CHLORIDE 9 MG/ML
3 INJECTION INTRAMUSCULAR; INTRAVENOUS; SUBCUTANEOUS EVERY 8 HOURS
Refills: 0 | Status: DISCONTINUED | OUTPATIENT
Start: 2020-07-24 | End: 2020-07-24

## 2020-07-24 RX ORDER — FINASTERIDE 5 MG/1
5 TABLET, FILM COATED ORAL DAILY
Refills: 0 | Status: DISCONTINUED | OUTPATIENT
Start: 2020-07-24 | End: 2020-07-31

## 2020-07-24 RX ADMIN — Medication 50 GRAM(S): at 13:36

## 2020-07-24 RX ADMIN — Medication 100 MILLIEQUIVALENT(S): at 15:35

## 2020-07-24 RX ADMIN — Medication 300 MILLIGRAM(S): at 21:49

## 2020-07-24 RX ADMIN — Medication 100 MILLIGRAM(S): at 16:15

## 2020-07-24 RX ADMIN — BENZOCAINE AND MENTHOL 1 LOZENGE: 5; 1 LIQUID ORAL at 15:35

## 2020-07-24 RX ADMIN — Medication 1 SPRAY(S): at 21:49

## 2020-07-24 NOTE — BRIEF OPERATIVE NOTE - NSICDXBRIEFPOSTOP_GEN_ALL_CORE_FT
POST-OP DIAGNOSIS:  1st degree AV block 24-Jul-2020 09:19:16  Grzegorz Milian  Right bundle branch block 24-Jul-2020 09:19:09  Grzegorz Milian  Chronic diastolic CHF (congestive heart failure), NYHA class 2 24-Jul-2020 09:18:39  Grzegorz Milian  Severe aortic stenosis 24-Jul-2020 09:18:28  Grzegorz Milian

## 2020-07-24 NOTE — CHART NOTE - NSCHARTNOTEFT_GEN_A_CORE
Commercial 23mm Reeves Marybel 3 Ultra Transfemoral TAVR via Right Common Femoral Artery Cutdown.  NCT# [id number], STS/ACC TVT Registry Patient ID# [id number].

## 2020-07-24 NOTE — CONSULT NOTE ADULT - SUBJECTIVE AND OBJECTIVE BOX
[place] [facility]  [facility]   Office: [address] [place] [address]  Telephone: [phone] Fax:[phone]    88 year old male h/o HTN, HLD, CAD s/p s/p PCI to the LAD June 2020, with residual 90% LCx awaiting staged intervention and 100% ostial RCA , previously paroxysmal atrial fibrillation (last documented sinus rhythm June 5, 2020), baseline 1st deg AV block and RBBB, and severe aortic stenosis s/p elective TAVR today (Angelika). Prior to admission for TAVR, patient reported dyspnea with moderate exertion and fatigue worsening over several weeks, which was attributed to sequela of severe AS. Post TAVR he remains in AF w/ slow VR of 30s-40s bpm at rest. EP is consulted to evaluate candidacy for pacemaker implant.     Notably, a semi-perm (active fixation) pacing lead was placed intra op via right subclavian access and attached to an external pacing generator for bradycardia support. The LRL is currently 35bpm and the patient is not requiring pacing at this time.       PAST MEDICAL & SURGICAL HISTORY:  Coronary artery disease: status post cardiac stents June 2020  Sinus bradycardia  Former tobacco use: quit 1984  Hyperlipidemia, mild  HTN (hypertension)  Moderate mitral regurgitation  AF (atrial fibrillation)  Severe aortic stenosis  S/P cataract surgery  H/O hernia repair      REVIEW OF SYSTEMS:  CONSTITUTIONAL: No fever, weight loss, or fatigue  EYES: No visual disturbances  NECK: No pain or stiffness  RESPIRATORY: No cough, wheezing, chills or hemoptysis; No shortness of breath  CARDIOVASCULAR: see HPI  GASTROINTESTINAL: No abdominal or epigastric pain. No nausea, vomiting, or hematemesis; No diarrhea or constipation. No melena or hematochezia.  NEUROLOGICAL: No headaches, memory loss, loss of strength, numbness, or tremors  SKIN: No itching, burning, rashes, or lesions   LYMPH NODES: No enlarged glands  ENDOCRINE: No heat or cold intolerance; No hair loss  PSYCHIATRIC: No depression, anxiety, mood swings, or difficulty sleeping  HEME/LYMPH: No easy bruising, or bleeding gums      Home Medications:  aspirin 81 mg oral delayed release tablet: 1 tab(s) orally once a day (24 Jul 2020 06:04)  Eliquis 2.5 mg oral tablet: 1 tab(s) orally 2 times a day (24 Jul 2020 06:04)  finasteride 5 mg oral tablet: 1 tab(s) orally once a day (24 Jul 2020 06:04)  rosuvastatin 40 mg oral tablet: 1 tab(s) orally once a day (24 Jul 2020 06:04)  tamsulosin 0.4 mg oral capsule: 1 cap(s) orally once a day (24 Jul 2020 06:04)  valsartan 320 mg oral tablet: 1 tab(s) orally once a day (24 Jul 2020 06:04)  Zetia 10 mg oral tablet: 1 tab(s) orally once a day (24 Jul 2020 06:04)    MEDICATIONS  (STANDING):  cefuroxime  IVPB 1500 milliGRAM(s) IV Intermittent once    Allergies  No Known Allergies      SOCIAL HISTORY: very active, lives at home with wife, former smoker.     FAMILY HISTORY:  FH: heart disease: mother and father both passed in 60&#x27;s      Vital Signs Last 24 Hrs  T(C): 36.1 (24 Jul 2020 05:48), Max: 36.1 (24 Jul 2020 05:48)  T(F): 97 (24 Jul 2020 05:48), Max: 97 (24 Jul 2020 05:48)  HR: 37 (24 Jul 2020 10:30) (37 - 58)  BP: 142/49 (24 Jul 2020 05:48) (142/49 - 142/49)  RR: 13 (24 Jul 2020 10:30) (13 - 18)  SpO2: 100% (24 Jul 2020 10:30) (100% - 100%)    Physical Exam:  Constitutional: AAOx3, NAD  Neck: supple, semiperm lead via right subclavian access.   Cardiovascular: +S1S2 bradycardic, no murmur/gallop/rub  Pulmonary: CTA b/l anteriorly, unlabored  Abdomen: +BS, soft NTND  Extremities: right groin cutdown site C/D/I, left access site C/D/I; no edema b/l, +distal pulses b/l  Neuro: non focal, speech clear, FREDERICK x 4    LABS:                        12.2   7.64  )-----------( 132      ( 24 Jul 2020 10:14 )             38.2   07-24    134<L>  |  99  |  25.0<H>  ----------------------------<  104<H>  3.8   |  20.0<L>  |  1.12    Ca    10.8<H>      24 Jul 2020 10:14  Phos  3.7     07-24  Mg     1.9     07-24    TPro  5.9<L>  /  Alb  3.5  /  TBili  0.8  /  DBili  0.3  /  AST  68<H>  /  ALT  69<H>  /  AlkPhos  52  07-24  LIVER FUNCTIONS - ( 24 Jul 2020 10:14 )  Alb: 3.5 g/dL / Pro: 5.9 g/dL / ALK PHOS: 52 U/L / ALT: 69 U/L / AST: 68 U/L / GGT: x           PT/INR - ( 24 Jul 2020 10:14 )   PT: 15.6 sec;   INR: 1.36 ratio      PTT - ( 24 Jul 2020 10:14 )  PTT:28.4 sec    RADIOLOGY & ADDITIONAL STUDIES:  < from: TTE Echo Complete w/o Contrast w/ Doppler (06.18.20 @ 09:21) >  Summary:   1. Left ventricular ejection fraction, by visual estimation, is 60 to 65%.   2. Normal global left ventricular systolic function.   3. Severely enlarged left atrium.   4. Normal left ventricular internal cavity size.   5. Spectral Doppler shows impaired relaxation pattern of left ventricular myocardial filling (Grade I diastolic dysfunction).   6. There is mild concentric left ventricular hypertrophy.   7. There is no evidence of pericardial effusion.   8. Mild mitral valve regurgitation.   9. Mild thickening and calcification of the anterior and posterior mitral valve leaflets.  10. Moderate mitral annular calcification.  11. Mild aortic regurgitation.  12. Estimated pulmonary artery systolic pressure is 36.6 mmHg assuming a right atrial pressure of 3 mmHg, which is consistent with borderline pulmonary hypertension.  13. Peak transaortic gradient equals 83.4 mmHg, mean transaortic gradient equals 40.8 mmHg, the calculated aortic valve area equals 0.75 cm² by the continuity equation consistent with severe aortic stenosis.  14. There is moderate aortic root calcification.    MD Nehemias Electronically signed on 6/19/2020 at 9:07:11 AM    < end of copied text >    < from: Cardiac Cath Lab - Adult (06.05.20 @ 08:25) >  CORONARY VESSELS: The coronary circulation is co-dominant.  LM:  --  LM: Angiography showed minor luminal irregularities with no flow  limiting lesions.  LAD:   --  Mid LAD: There was a tubular 95 % stenosis. The lesion was  eccentric and moderately calcified.  CX:   --  Proximal circumflex: There was a tubular 90 % stenosis. The  lesion was eccentric and moderately calcified.  --  Mid circumflex: There was a diffuse 50 % stenosis. The lesion was  irregularly contoured and heavily calcified.  RCA:   --  Ostial RCA: There was a 100 % stenosis. This lesion is a chronic  total occlusion.  COMPLICATIONS: There were no complications.  DIAGNOSTIC IMPRESSIONS: Moderate Pulmonary Hypertension. 2. Normal LV  function with LVEF of 65% on TTE from 11/2019. 3. Severe Aortic Stenosis  by TTE with a mean PG of 40mm Hgand PAOLO of 07cm2. 4. Triple vessel CAD.  5. Successful PCI to Proximal to mid LAD with ZESsx3.  DIAGNOSTIC RECOMMENDATIONS: Plavix for at least 6 months. 2. Aspirin 81mg  for 2 weeks, then to resume after Plavix is discontinued. 3. May resume  Eliquis tonite or in AM. 4. PPI or [id number] for GI protection. 5. CT  surgical evaluation for ZACARIAS. 6. Staged PCI to LCX after the ZACARIAS.  INTERVENTIONAL IMPRESSIONS: Moderate Pulmonary Hypertension. 2. Normal LV  function with LVEF of 65% on TTE from 11/2019. 3. Severe Aortic Stenosis  by TTE with a mean PG of 40mm Hg and PAOLO of 07cm2. 4. Triple vessel CAD.  5. Successful PCI to Proximal to mid LAD with ZESsx3.  INTERVENTIONAL RECOMMENDATIONS: Plavix for at least 6 months. 2. Aspirin  81mg for 2 weeks, then to resume after Plavix is discontinued. 3. May  resume Eliquis tonite or in AM. 4. PPI or [id number] for GI protection. 5.  CT surgical evaluation for ZACARIAS. 6. Staged PCI to LCX after the ZACARIAS.  Prepared and signed by  Clay Cary M.D.  Signed 06/05/2020 11:03:54    < end of copied text > [place] [facility]  [facility]   Office: [address] [place] [address]  Telephone: [phone] Fax:[phone]    88 year old male h/o HTN, HLD, CAD s/p s/p PCI to the LAD June 2020, with residual 90% LCx awaiting staged intervention and 100% ostial RCA , previously paroxysmal atrial fibrillation (last documented sinus rhythm June 5, 2020), baseline 1st deg AV block and RBBB, and severe aortic stenosis s/p elective TAVR today (Angelika). Prior to admission for TAVR, patient reported dyspnea with moderate exertion and fatigue worsening over several weeks, which was attributed to sequela of severe AS. Post TAVR he remains in AF with apparent CHB (regular escape rhythm at 30s-40s bpm). EP is consulted to evaluate candidacy for pacemaker implant.     Notably, a semi-perm (active fixation) pacing lead was placed intra op via right subclavian access and attached to an external pacing generator for bradycardia support. The LRL is currently 35bpm and the patient is not requiring pacing at this time.       PAST MEDICAL & SURGICAL HISTORY:  Coronary artery disease: status post cardiac stents June 2020  Sinus bradycardia  Former tobacco use: quit 1984  Hyperlipidemia, mild  HTN (hypertension)  Moderate mitral regurgitation  AF (atrial fibrillation)  Severe aortic stenosis  S/P cataract surgery  H/O hernia repair      REVIEW OF SYSTEMS:  CONSTITUTIONAL: No fever, weight loss, or fatigue  EYES: No visual disturbances  NECK: No pain or stiffness  RESPIRATORY: No cough, wheezing, chills or hemoptysis; No shortness of breath  CARDIOVASCULAR: see HPI  GASTROINTESTINAL: No abdominal or epigastric pain. No nausea, vomiting, or hematemesis; No diarrhea or constipation. No melena or hematochezia.  NEUROLOGICAL: No headaches, memory loss, loss of strength, numbness, or tremors  SKIN: No itching, burning, rashes, or lesions   LYMPH NODES: No enlarged glands  ENDOCRINE: No heat or cold intolerance; No hair loss  PSYCHIATRIC: No depression, anxiety, mood swings, or difficulty sleeping  HEME/LYMPH: No easy bruising, or bleeding gums      Home Medications:  aspirin 81 mg oral delayed release tablet: 1 tab(s) orally once a day (24 Jul 2020 06:04)  Eliquis 2.5 mg oral tablet: 1 tab(s) orally 2 times a day (24 Jul 2020 06:04)  finasteride 5 mg oral tablet: 1 tab(s) orally once a day (24 Jul 2020 06:04)  rosuvastatin 40 mg oral tablet: 1 tab(s) orally once a day (24 Jul 2020 06:04)  tamsulosin 0.4 mg oral capsule: 1 cap(s) orally once a day (24 Jul 2020 06:04)  valsartan 320 mg oral tablet: 1 tab(s) orally once a day (24 Jul 2020 06:04)  Zetia 10 mg oral tablet: 1 tab(s) orally once a day (24 Jul 2020 06:04)    MEDICATIONS  (STANDING):  cefuroxime  IVPB 1500 milliGRAM(s) IV Intermittent once    Allergies  No Known Allergies      SOCIAL HISTORY: very active, lives at home with wife, former smoker.     FAMILY HISTORY:  FH: heart disease: mother and father both passed in 60&#x27;s      Vital Signs Last 24 Hrs  T(C): 36.1 (24 Jul 2020 05:48), Max: 36.1 (24 Jul 2020 05:48)  T(F): 97 (24 Jul 2020 05:48), Max: 97 (24 Jul 2020 05:48)  HR: 37 (24 Jul 2020 10:30) (37 - 58)  BP: 142/49 (24 Jul 2020 05:48) (142/49 - 142/49)  RR: 13 (24 Jul 2020 10:30) (13 - 18)  SpO2: 100% (24 Jul 2020 10:30) (100% - 100%)    Physical Exam:  Constitutional: AAOx3, NAD  Neck: supple, semiperm lead via right subclavian access.   Cardiovascular: +S1S2 bradycardic, no murmur/gallop/rub  Pulmonary: CTA b/l anteriorly, unlabored  Abdomen: +BS, soft NTND  Extremities: right groin cutdown site C/D/I, left access site C/D/I; no edema b/l, +distal pulses b/l  Neuro: non focal, speech clear, FREDERICK x 4    LABS:                        12.2   7.64  )-----------( 132      ( 24 Jul 2020 10:14 )             38.2   07-24    134<L>  |  99  |  25.0<H>  ----------------------------<  104<H>  3.8   |  20.0<L>  |  1.12    Ca    10.8<H>      24 Jul 2020 10:14  Phos  3.7     07-24  Mg     1.9     07-24    TPro  5.9<L>  /  Alb  3.5  /  TBili  0.8  /  DBili  0.3  /  AST  68<H>  /  ALT  69<H>  /  AlkPhos  52  07-24  LIVER FUNCTIONS - ( 24 Jul 2020 10:14 )  Alb: 3.5 g/dL / Pro: 5.9 g/dL / ALK PHOS: 52 U/L / ALT: 69 U/L / AST: 68 U/L / GGT: x           PT/INR - ( 24 Jul 2020 10:14 )   PT: 15.6 sec;   INR: 1.36 ratio      PTT - ( 24 Jul 2020 10:14 )  PTT:28.4 sec    RADIOLOGY & ADDITIONAL STUDIES:  < from: TTE Echo Complete w/o Contrast w/ Doppler (06.18.20 @ 09:21) >  Summary:   1. Left ventricular ejection fraction, by visual estimation, is 60 to 65%.   2. Normal global left ventricular systolic function.   3. Severely enlarged left atrium.   4. Normal left ventricular internal cavity size.   5. Spectral Doppler shows impaired relaxation pattern of left ventricular myocardial filling (Grade I diastolic dysfunction).   6. There is mild concentric left ventricular hypertrophy.   7. There is no evidence of pericardial effusion.   8. Mild mitral valve regurgitation.   9. Mild thickening and calcification of the anterior and posterior mitral valve leaflets.  10. Moderate mitral annular calcification.  11. Mild aortic regurgitation.  12. Estimated pulmonary artery systolic pressure is 36.6 mmHg assuming a right atrial pressure of 3 mmHg, which is consistent with borderline pulmonary hypertension.  13. Peak transaortic gradient equals 83.4 mmHg, mean transaortic gradient equals 40.8 mmHg, the calculated aortic valve area equals 0.75 cm² by the continuity equation consistent with severe aortic stenosis.  14. There is moderate aortic root calcification.    MD Nehemias Electronically signed on 6/19/2020 at 9:07:11 AM    < end of copied text >    < from: Cardiac Cath Lab - Adult (06.05.20 @ 08:25) >  CORONARY VESSELS: The coronary circulation is co-dominant.  LM:  --  LM: Angiography showed minor luminal irregularities with no flow  limiting lesions.  LAD:   --  Mid LAD: There was a tubular 95 % stenosis. The lesion was  eccentric and moderately calcified.  CX:   --  Proximal circumflex: There was a tubular 90 % stenosis. The  lesion was eccentric and moderately calcified.  --  Mid circumflex: There was a diffuse 50 % stenosis. The lesion was  irregularly contoured and heavily calcified.  RCA:   --  Ostial RCA: There was a 100 % stenosis. This lesion is a chronic  total occlusion.  COMPLICATIONS: There were no complications.  DIAGNOSTIC IMPRESSIONS: Moderate Pulmonary Hypertension. 2. Normal LV  function with LVEF of 65% on TTE from 11/2019. 3. Severe Aortic Stenosis  by TTE with a mean PG of 40mm Hgand PAOLO of 07cm2. 4. Triple vessel CAD.  5. Successful PCI to Proximal to mid LAD with ZESsx3.  DIAGNOSTIC RECOMMENDATIONS: Plavix for at least 6 months. 2. Aspirin 81mg  for 2 weeks, then to resume after Plavix is discontinued. 3. May resume  Eliquis tonite or in AM. 4. PPI or [id number] for GI protection. 5. CT  surgical evaluation for ZACARIAS. 6. Staged PCI to LCX after the ZACARIAS.  INTERVENTIONAL IMPRESSIONS: Moderate Pulmonary Hypertension. 2. Normal LV  function with LVEF of 65% on TTE from 11/2019. 3. Severe Aortic Stenosis  by TTE with a mean PG of 40mm Hg and PAOLO of 07cm2. 4. Triple vessel CAD.  5. Successful PCI to Proximal to mid LAD with ZESsx3.  INTERVENTIONAL RECOMMENDATIONS: Plavix for at least 6 months. 2. Aspirin  81mg for 2 weeks, then to resume after Plavix is discontinued. 3. May  resume Eliquis tonite or in AM. 4. PPI or [id number] for GI protection. 5.  CT surgical evaluation for ZACARIAS. 6. Staged PCI to LCX after the ZACARIAS.  Prepared and signed by  Clay Cary M.D.  Signed 06/05/2020 11:03:54    < end of copied text >

## 2020-07-24 NOTE — BRIEF OPERATIVE NOTE - NSICDXBRIEFPROCEDURE_GEN_ALL_CORE_FT
PROCEDURES:  TAVR, open femoral artery approach 24-Jul-2020 09:19:28 Transfemoral TAVR via Right Common Femoral Artery Cutdown (23mm Marybel 3 Ultra) (NCT# [id number]) (STS/ACC TVT Registry Patient ID# [id number]) Grzegorz Milian

## 2020-07-24 NOTE — BRIEF OPERATIVE NOTE - COMMENTS
[employer] Representative: Yobani Pinto (teaching valve prep & support case)  Invasive Lines: Right Radial Arterial Line, Right Subclavian Sheath with TVP  IV Medication Infusions: None

## 2020-07-24 NOTE — BRIEF OPERATIVE NOTE - NSICDXBRIEFPREOP_GEN_ALL_CORE_FT
PRE-OP DIAGNOSIS:  1st degree AV block 24-Jul-2020 09:18:15  Grzegorz Milian  Right bundle branch block 24-Jul-2020 09:18:05  Grzegorz Milian  Chronic diastolic CHF (congestive heart failure), NYHA class 2 24-Jul-2020 09:17:41  Grzegorz Milian  Severe aortic stenosis 24-Jul-2020 09:17:29  Grzegorz Milian

## 2020-07-24 NOTE — CONSULT NOTE ADULT - ASSESSMENT
88 year old male h/o HTN, HLD, CAD s/p s/p PCI to the LAD June 2020, with residual 90% LCx awaiting staged intervention and 100% ostial RCA , previously paroxysmal atrial fibrillation (last documented sinus rhythm June 5, 2020), baseline 1st deg AV block and RBBB, and severe aortic stenosis now s/p elective TAVR today (Angelika). Post TAVR he remains in AF w/ slow VR of 30s-40s bpm at rest. EP is consulted to evaluate candidacy for pacemaker implant.     Recommendations:   Will assess HR response once pt is able to ambulate.   Notably, pt is not currently on BB tx for CAD secondary to conduction disease.   Will d/w EP attending - further recs to follow. 88 year old male h/o HTN, HLD, CAD s/p s/p PCI to the LAD June 2020, with residual 90% LCx awaiting staged intervention and 100% ostial RCA , previously paroxysmal atrial fibrillation (last documented sinus rhythm June 5, 2020), baseline 1st deg AV block and RBBB, and severe aortic stenosis now s/p elective TAVR today (Angelika). Post TAVR he remains in AF w/ slow VR of 30s-40s bpm at rest. EP is consulted to evaluate candidacy for pacemaker implant.     Recommendations:   Will assess HR response once pt is able to ambulate.   Notably, pt is not currently on BB tx for CAD secondary to conduction disease.   Please keep pt NPO.   Will d/w EP attending - further recs to follow. 88 year old male h/o HTN, HLD, CAD s/p s/p PCI to the LAD June 2020, with residual 90% LCx awaiting staged intervention and 100% ostial RCA , previously paroxysmal atrial fibrillation (last documented sinus rhythm June 5, 2020), baseline 1st deg AV block and RBBB, and severe aortic stenosis now s/p elective TAVR today (Angelika). Post TAVR he remains in AF with apparent CHB and regular escape of 30s-40s bpm at rest. Notably, pt is not currently on BB tx for CAD secondary to conduction disease. EP is consulted to evaluate candidacy for pacemaker implant.     Recommendations:   Pt in CHB w/ semi-perm pacemaker in place. After d/w Dr. Carney and EP team, will defer pacemaker today to allow for some recovery time post ablation.   Plan for leadless pacemaker implant via left femoral vein on Monday 7/27. Please keep pt NPO after midnight prior.   Will d/w EP attending - further recs to follow. 88 year old male h/o HTN, HLD, CAD s/p s/p PCI to the LAD June 2020, with residual 90% LCx awaiting staged intervention and 100% ostial RCA , previously paroxysmal atrial fibrillation (last documented sinus rhythm June 5, 2020), baseline 1st deg AV block and RBBB, and severe aortic stenosis now s/p elective TAVR today (Angelika). Post TAVR he remains in AF with apparent CHB and regular escape of 30s-40s bpm at rest. Notably, pt is not currently on BB tx for CAD secondary to conduction disease. EP is consulted to evaluate candidacy for pacemaker implant.     Recommendations:   Pt in CHB w/ semi-perm pacemaker in place. After d/w Dr. Carney and EP team, will defer pacemaker today to allow for some recovery time post TAVR.   Plan for leadless pacemaker implant via left femoral vein on Monday 7/27. Please keep pt NPO after midnight prior.   Will d/w EP attending - further recs to follow. 88 year old male h/o HTN, HLD, CAD s/p s/p PCI to the LAD June 2020, with residual 90% LCx awaiting staged intervention and 100% ostial RCA , previously paroxysmal atrial fibrillation (last documented sinus rhythm June 5, 2020), baseline 1st deg AV block and RBBB, and severe aortic stenosis now s/p elective TAVR today (Angelika). Post TAVR he remains in AF with apparent CHB and regular escape of 30s-40s bpm at rest. Notably, pt is not currently on BB tx for CAD secondary to conduction disease. EP is consulted to evaluate candidacy for pacemaker implant.     Recommendations:   Pt in CHB w/ semi-perm pacemaker in place. After d/w Dr. Carney and EP team, will defer pacemaker today to allow for some recovery time post TAVR.  Patient will need pacing support.    Will consider leadless pacemaker implant via left femoral vein on Monday 7/27. Please keep pt NPO after midnight prior.

## 2020-07-25 DIAGNOSIS — I25.10 ATHEROSCLEROTIC HEART DISEASE OF NATIVE CORONARY ARTERY WITHOUT ANGINA PECTORIS: ICD-10-CM

## 2020-07-25 LAB
ALBUMIN SERPL ELPH-MCNC: 3.3 G/DL — SIGNIFICANT CHANGE UP (ref 3.3–5.2)
ALP SERPL-CCNC: 46 U/L — SIGNIFICANT CHANGE UP (ref 40–120)
ALT FLD-CCNC: 55 U/L — HIGH
ANION GAP SERPL CALC-SCNC: 10 MMOL/L — SIGNIFICANT CHANGE UP (ref 5–17)
ANION GAP SERPL CALC-SCNC: 13 MMOL/L — SIGNIFICANT CHANGE UP (ref 5–17)
APTT BLD: 29.5 SEC — SIGNIFICANT CHANGE UP (ref 27.5–35.5)
AST SERPL-CCNC: 53 U/L — HIGH
BILIRUB DIRECT SERPL-MCNC: 0.2 MG/DL — SIGNIFICANT CHANGE UP (ref 0–0.3)
BILIRUB INDIRECT FLD-MCNC: 0.6 MG/DL — SIGNIFICANT CHANGE UP (ref 0.2–1)
BILIRUB SERPL-MCNC: 0.8 MG/DL — SIGNIFICANT CHANGE UP (ref 0.4–2)
BUN SERPL-MCNC: 25 MG/DL — HIGH (ref 8–20)
BUN SERPL-MCNC: 27 MG/DL — HIGH (ref 8–20)
CALCIUM SERPL-MCNC: 9 MG/DL — SIGNIFICANT CHANGE UP (ref 8.6–10.2)
CALCIUM SERPL-MCNC: 9.4 MG/DL — SIGNIFICANT CHANGE UP (ref 8.6–10.2)
CHLORIDE SERPL-SCNC: 101 MMOL/L — SIGNIFICANT CHANGE UP (ref 98–107)
CHLORIDE SERPL-SCNC: 101 MMOL/L — SIGNIFICANT CHANGE UP (ref 98–107)
CO2 SERPL-SCNC: 22 MMOL/L — SIGNIFICANT CHANGE UP (ref 22–29)
CO2 SERPL-SCNC: 24 MMOL/L — SIGNIFICANT CHANGE UP (ref 22–29)
CREAT SERPL-MCNC: 1.01 MG/DL — SIGNIFICANT CHANGE UP (ref 0.5–1.3)
CREAT SERPL-MCNC: 1.1 MG/DL — SIGNIFICANT CHANGE UP (ref 0.5–1.3)
GLUCOSE SERPL-MCNC: 121 MG/DL — HIGH (ref 70–99)
GLUCOSE SERPL-MCNC: 153 MG/DL — HIGH (ref 70–99)
HCT VFR BLD CALC: 36.4 % — LOW (ref 39–50)
HGB BLD-MCNC: 12 G/DL — LOW (ref 13–17)
INR BLD: 1.41 RATIO — HIGH (ref 0.88–1.16)
MAGNESIUM SERPL-MCNC: 2.2 MG/DL — SIGNIFICANT CHANGE UP (ref 1.6–2.6)
MAGNESIUM SERPL-MCNC: 2.2 MG/DL — SIGNIFICANT CHANGE UP (ref 1.6–2.6)
MCHC RBC-ENTMCNC: 30.3 PG — SIGNIFICANT CHANGE UP (ref 27–34)
MCHC RBC-ENTMCNC: 33 GM/DL — SIGNIFICANT CHANGE UP (ref 32–36)
MCV RBC AUTO: 91.9 FL — SIGNIFICANT CHANGE UP (ref 80–100)
PHOSPHATE SERPL-MCNC: 2.8 MG/DL — SIGNIFICANT CHANGE UP (ref 2.4–4.7)
PLATELET # BLD AUTO: 147 K/UL — LOW (ref 150–400)
POTASSIUM SERPL-MCNC: 4.4 MMOL/L — SIGNIFICANT CHANGE UP (ref 3.5–5.3)
POTASSIUM SERPL-MCNC: 4.7 MMOL/L — SIGNIFICANT CHANGE UP (ref 3.5–5.3)
POTASSIUM SERPL-SCNC: 4.4 MMOL/L — SIGNIFICANT CHANGE UP (ref 3.5–5.3)
POTASSIUM SERPL-SCNC: 4.7 MMOL/L — SIGNIFICANT CHANGE UP (ref 3.5–5.3)
PROT SERPL-MCNC: 5.8 G/DL — LOW (ref 6.6–8.7)
PROTHROM AB SERPL-ACNC: 16.1 SEC — HIGH (ref 10.6–13.6)
RBC # BLD: 3.96 M/UL — LOW (ref 4.2–5.8)
RBC # FLD: 15.9 % — HIGH (ref 10.3–14.5)
SODIUM SERPL-SCNC: 135 MMOL/L — SIGNIFICANT CHANGE UP (ref 135–145)
SODIUM SERPL-SCNC: 136 MMOL/L — SIGNIFICANT CHANGE UP (ref 135–145)
WBC # BLD: 13.53 K/UL — HIGH (ref 3.8–10.5)
WBC # FLD AUTO: 13.53 K/UL — HIGH (ref 3.8–10.5)

## 2020-07-25 PROCEDURE — 99222 1ST HOSP IP/OBS MODERATE 55: CPT

## 2020-07-25 PROCEDURE — 71045 X-RAY EXAM CHEST 1 VIEW: CPT | Mod: 26

## 2020-07-25 PROCEDURE — 93010 ELECTROCARDIOGRAM REPORT: CPT

## 2020-07-25 RX ADMIN — Medication 100 MILLIGRAM(S): at 00:09

## 2020-07-25 RX ADMIN — FINASTERIDE 5 MILLIGRAM(S): 5 TABLET, FILM COATED ORAL at 11:44

## 2020-07-25 RX ADMIN — CLOPIDOGREL BISULFATE 75 MILLIGRAM(S): 75 TABLET, FILM COATED ORAL at 11:45

## 2020-07-25 RX ADMIN — Medication 81 MILLIGRAM(S): at 11:44

## 2020-07-25 RX ADMIN — ATORVASTATIN CALCIUM 80 MILLIGRAM(S): 80 TABLET, FILM COATED ORAL at 21:22

## 2020-07-25 RX ADMIN — TAMSULOSIN HYDROCHLORIDE 0.4 MILLIGRAM(S): 0.4 CAPSULE ORAL at 21:22

## 2020-07-25 RX ADMIN — Medication 100 MILLIGRAM(S): at 07:51

## 2020-07-25 NOTE — PHYSICAL THERAPY INITIAL EVALUATION ADULT - ADDITIONAL COMMENTS
Patient lives in a house with 2 MARY (+) railing. He lives with his wife who is available to assist as needed. He was independent prior to admission without use of AD, has no DME at home.

## 2020-07-25 NOTE — SWALLOW BEDSIDE ASSESSMENT ADULT - ASR SWALLOW ASPIRATION MONITOR
pneumonia/throat clearing/change of breathing pattern/fever/upper respiratory infection/cough/gurgly voice/oral hygiene/position upright (89Y)

## 2020-07-25 NOTE — SWALLOW BEDSIDE ASSESSMENT ADULT - SWALLOW EVAL: DIAGNOSIS
Oral stage WFL. Cannot r/o pharyngeal dysphagia with soft solids or thin liquids. +c/o throat pain 5/5 at rest, 8/10 with swallow as well as c/o "phlegm in my throat" - impacting overall swallow function. Oropharyngeal swallow function appears functional for puree/nectar liquids.

## 2020-07-25 NOTE — SWALLOW BEDSIDE ASSESSMENT ADULT - PHARYNGEAL PHASE
Multiple swallows/Delayed cough post oral intake grossly functional; pt reports "that feels better" with no observed throat clear post swallows Wet vocal quality post oral intake/Throat clear post oral intake/Multiple swallows + facial grimace with swallow/Within functional limits Throat clear post oral intake/+facial grimace with swallow/Multiple swallows

## 2020-07-25 NOTE — PROGRESS NOTE ADULT - ASSESSMENT
7/24 post TAVR, Commercial 23mm [employer] Marybel 3 Ultra Transfemoral TAVR via Right Common Femoral Artery Cutdown.  NCT# [id number], STS/ACC TVT Registry Patient ID# [id number].

## 2020-07-25 NOTE — SWALLOW BEDSIDE ASSESSMENT ADULT - SWALLOW EVAL: RECOMMENDED FEEDING/EATING TECHNIQUES
position upright (90 degrees)/allow for swallow between intakes/crush medication (when feasible)/small sips/bites/maintain upright posture during/after eating for 30 mins/oral hygiene

## 2020-07-25 NOTE — SWALLOW BEDSIDE ASSESSMENT ADULT - SLP GENERAL OBSERVATIONS
Pt received A&A in bed, ox3, cooperative, c/o throat pain 5/10 at rest, 8/10 with swallow (RN aware), SP02 99% on room air, +baseline throat clearing and c/o phlegm and "feels like my throat is swollen."

## 2020-07-25 NOTE — PHYSICAL THERAPY INITIAL EVALUATION ADULT - GENERAL OBSERVATIONS, REHAB EVAL
Patient received seated in chair, NAD, breathing RA, +monitor, +external pacer box. Pt agreeable to Physical Therapy evaluation.

## 2020-07-25 NOTE — SWALLOW BEDSIDE ASSESSMENT ADULT - SLP PERTINENT HISTORY OF CURRENT PROBLEM
Patient is a 88y old  Male who presents with a chief complaint of "I am having the valve changed in my heart". PMH significant for MI, HTN, A-fib, CAD, HLD and severe aortic stenosis. He is s/p elective TAVR on July 24, 2020 with Dr. Carney. patient complaining of throat pain overnight, got one dose of cepacol spray. Had some pink froth sputum as well. RN reports pt with c/o throat pain

## 2020-07-25 NOTE — PROGRESS NOTE ADULT - SUBJECTIVE AND OBJECTIVE BOX
Significant recent/past 24 hr events: patient complaining of throat pain overnight, got one dose of cepacol spray. Had some pink froth sputum as well. Patient had some urinary retention despite having condom cath, bladder scan reveal 400ml in bladder. Did not recieved his flomax this day shift having a hard time swallowing. Will rescan in few hours. Speech and swallow to eval in morning.     Subjective:    Review of Systems  Patient is complaining of a sore throat     Patient is a 88y old  Male who presents with a chief complaint of "I am having the valve changed in my heart". (15 Jul 2020 12:03)    HPI:  88 year old male alert and oriented to time, place, situation presents accompanied by his daughter who offers additional information.    PMH significant for MI, HTN, A-fib, CAD, HLD and severe aortic stenosis. He is planning to undergo elective TAVR on July 24, 2020 with Dr. Carney.    Patient reports dyspnea on moderate exertion and fatigue which has become significantly worse over the past three weeks.    Pre operative cardiac cath. revealed obstructive CAD and is now s/p PCI to the LAD (June 2020). The patient has residual disease in the left circumflex and is planning to have a staged intervention once the TAVR is complete.    He denies any angina, palpitations, dizziness, syncope, shortness of breath at rest, orthopnea and PND.      His daughter reports that he does snore and at times has cessation of breathing during sleep however the patient has never been tested for NEREIDA. (15 Jul 2020 12:03)    PAST MEDICAL & SURGICAL HISTORY:  Coronary artery disease: status post cardiac stents June 2020  Sinus bradycardia  Former tobacco use: quit 1984  Hyperlipidemia, mild  HTN (hypertension)  Moderate mitral regurgitation  AF (atrial fibrillation)  Severe aortic stenosis  S/P cataract surgery  H/O hernia repair    FAMILY HISTORY:  FH: heart disease: mother and father both passed in 60&#x27;s      Vitals   ICU Vital Signs Last 24 Hrs  T(C): 37.3 (24 Jul 2020 19:38), Max: 37.3 (24 Jul 2020 19:38)  T(F): 99.1 (24 Jul 2020 19:38), Max: 99.1 (24 Jul 2020 19:38)  HR: 60 (25 Jul 2020 00:00) (36 - 60)  BP: 129/58 (25 Jul 2020 00:00) (108/54 - 168/76)  BP(mean): 85 (24 Jul 2020 22:00) (78 - 109)  ABP: 125/40 (25 Jul 2020 00:00) (112/32 - 184/57)  ABP(mean): 62 (25 Jul 2020 00:00) (53 - 93)  RR: 22 (25 Jul 2020 00:00) (10 - 26)  SpO2: 97% (25 Jul 2020 00:00) (92% - 100%)      VENT SETTINGS     ABG - ( 24 Jul 2020 10:36 )  pH, Arterial: 7.36  pH, Blood: x     /  pCO2: 39    /  pO2: 197   / HCO3: 22    / Base Excess: -2.8  /  SaO2: 100                 I&O's Detail    24 Jul 2020 07:01  -  25 Jul 2020 00:51  --------------------------------------------------------  IN:    Solution: 50 mL  Total IN: 50 mL    OUT:    Incontinent per Condom Catheter: 150 mL    Voided: 100 mL  Total OUT: 250 mL    Total NET: -200 mL          LABS                        12.2   7.64  )-----------( 132      ( 24 Jul 2020 10:14 )             38.2     07-24    134<L>  |  99  |  25.0<H>  ----------------------------<  104<H>  3.8   |  20.0<L>  |  1.12    Ca    10.8<H>      24 Jul 2020 10:14  Phos  3.7     07-24  Mg     1.9     07-24    TPro  5.9<L>  /  Alb  3.5  /  TBili  0.8  /  DBili  0.3  /  AST  68<H>  /  ALT  69<H>  /  AlkPhos  52  07-24    PT/INR - ( 24 Jul 2020 10:14 )   PT: 15.6 sec;   INR: 1.36 ratio         PTT - ( 24 Jul 2020 10:14 )  PTT:28.4 sec                MEDICATIONS  (STANDING):  aspirin enteric coated 81 milliGRAM(s) Oral daily  atorvastatin 80 milliGRAM(s) Oral at bedtime  cefuroxime  IVPB 1500 milliGRAM(s) IV Intermittent every 8 hours  cefuroxime  IVPB 1500 milliGRAM(s) IV Intermittent once  clopidogrel Tablet 75 milliGRAM(s) Oral daily  finasteride 5 milliGRAM(s) Oral daily  lidocaine 1% Injectable 15 milliLiter(s) Local Injection once  tamsulosin 0.4 milliGRAM(s) Oral at bedtime    MEDICATIONS  (PRN):    Allergies:  No Known Allergies      Physical Exam:   Constitutional: NAD, well-groomed, well-developed  HEENT: PERRLA, EOMI, no drainage or redness  Neck: supple,  No JVD  Respiratory: Breath Sounds equal & clear bilaterally to auscultation, no rales/rhonchi/wheezing, no accessory muscle use noted  Cardiovascular: Regular rate, regular rhythm, normal S1, S2; no murmurs or rub  Gastrointestinal: Soft, non-tender, non distended, + bowel sounds  Extremities: FREDERICK x 4, no peripheral edema, no cyanosis, no clubbing, upper right chest dressing is c/d/i and right groin is soft    Neurological: A+O x 3; speech clear and intact; no sensory, motor  deficits, normal reflexes  Skin: warm, dry, well perfused

## 2020-07-26 DIAGNOSIS — I50.32 CHRONIC DIASTOLIC (CONGESTIVE) HEART FAILURE: ICD-10-CM

## 2020-07-26 LAB
ANION GAP SERPL CALC-SCNC: 9 MMOL/L — SIGNIFICANT CHANGE UP (ref 5–17)
BUN SERPL-MCNC: 20 MG/DL — SIGNIFICANT CHANGE UP (ref 8–20)
CALCIUM SERPL-MCNC: 8.8 MG/DL — SIGNIFICANT CHANGE UP (ref 8.6–10.2)
CHLORIDE SERPL-SCNC: 97 MMOL/L — LOW (ref 98–107)
CO2 SERPL-SCNC: 28 MMOL/L — SIGNIFICANT CHANGE UP (ref 22–29)
CREAT SERPL-MCNC: 0.93 MG/DL — SIGNIFICANT CHANGE UP (ref 0.5–1.3)
GLUCOSE SERPL-MCNC: 106 MG/DL — HIGH (ref 70–99)
POTASSIUM SERPL-MCNC: 4.2 MMOL/L — SIGNIFICANT CHANGE UP (ref 3.5–5.3)
POTASSIUM SERPL-SCNC: 4.2 MMOL/L — SIGNIFICANT CHANGE UP (ref 3.5–5.3)
SODIUM SERPL-SCNC: 134 MMOL/L — LOW (ref 135–145)

## 2020-07-26 PROCEDURE — 99232 SBSQ HOSP IP/OBS MODERATE 35: CPT

## 2020-07-26 PROCEDURE — 71045 X-RAY EXAM CHEST 1 VIEW: CPT | Mod: 26

## 2020-07-26 RX ORDER — SODIUM CHLORIDE 9 MG/ML
3 INJECTION INTRAMUSCULAR; INTRAVENOUS; SUBCUTANEOUS EVERY 8 HOURS
Refills: 0 | Status: DISCONTINUED | OUTPATIENT
Start: 2020-07-26 | End: 2020-07-31

## 2020-07-26 RX ADMIN — SODIUM CHLORIDE 3 MILLILITER(S): 9 INJECTION INTRAMUSCULAR; INTRAVENOUS; SUBCUTANEOUS at 21:22

## 2020-07-26 RX ADMIN — Medication 81 MILLIGRAM(S): at 12:14

## 2020-07-26 RX ADMIN — ATORVASTATIN CALCIUM 80 MILLIGRAM(S): 80 TABLET, FILM COATED ORAL at 21:43

## 2020-07-26 RX ADMIN — CLOPIDOGREL BISULFATE 75 MILLIGRAM(S): 75 TABLET, FILM COATED ORAL at 12:14

## 2020-07-26 RX ADMIN — TAMSULOSIN HYDROCHLORIDE 0.4 MILLIGRAM(S): 0.4 CAPSULE ORAL at 21:43

## 2020-07-26 RX ADMIN — FINASTERIDE 5 MILLIGRAM(S): 5 TABLET, FILM COATED ORAL at 12:14

## 2020-07-26 RX ADMIN — SODIUM CHLORIDE 3 MILLILITER(S): 9 INJECTION INTRAMUSCULAR; INTRAVENOUS; SUBCUTANEOUS at 12:15

## 2020-07-26 NOTE — PROGRESS NOTE ADULT - SUBJECTIVE AND OBJECTIVE BOX
Subjective: no c/o incisional pain at this time. Denies CP, SOB, palpitations, N/V, other c/o.    T(C): 37.6 (07-26-20 @ 00:00), Max: 37.6 (07-26-20 @ 00:00)  HR: 59 (07-26-20 @ 02:00) (59 - 60)  BP: 161/76 (07-26-20 @ 02:00) (117/59 - 188/84)  ABP: 119/40 (07-25-20 @ 05:00) (114/38 - 121/40)  ABP(mean): 64 (07-25-20 @ 05:00) (61 - 65)  RR: 23 (07-26-20 @ 02:00) (10 - 29)  SpO2: 97% (07-26-20 @ 02:00) (96% - 99%)  Wt(kg): --  CVP(mm Hg): --  CO: --  CI: --  PA: --       I&O's Detail    24 Jul 2020 07:01  -  25 Jul 2020 07:00  --------------------------------------------------------  IN:    Solution: 50 mL    Solution: 50 mL  Total IN: 100 mL    OUT:    Incontinent per Condom Catheter: 175 mL    Intermittent Catheterization - Urethral: 480 mL    Voided: 100 mL  Total OUT: 755 mL    Total NET: -655 mL      25 Jul 2020 07:01  -  26 Jul 2020 02:14  --------------------------------------------------------  IN:    Solution: 50 mL  Total IN: 50 mL    OUT:    Indwelling Catheter - Urethral: 1375 mL  Total OUT: 1375 mL    Total NET: -1325 mL          LABS: All Lab data reviewed and analyzed                        12.0   13.53 )-----------( 147      ( 25 Jul 2020 02:30 )             36.4     07-25    135  |  101  |  25.0<H>  ----------------------------<  121<H>  4.4   |  24.0  |  1.01    Ca    9.0      25 Jul 2020 08:41  Phos  2.8     07-25  Mg     2.2     07-25    TPro  5.8<L>  /  Alb  3.3  /  TBili  0.8  /  DBili  0.2  /  AST  53<H>  /  ALT  55<H>  /  AlkPhos  46  07-25    PT/INR - ( 25 Jul 2020 02:30 )   PT: 16.1 sec;   INR: 1.41 ratio         PTT - ( 25 Jul 2020 02:30 )  PTT:29.5 sec      ABG - ( 24 Jul 2020 10:36 )  pH, Arterial: 7.36  pH, Blood: x     /  pCO2: 39    /  pO2: 197   / HCO3: 22    / Base Excess: -2.8  /  SaO2: 100               CAPILLARY BLOOD GLUCOSE               RADIOLOGY: - Reviewed and analyzed   CXR: pending    HOSPITAL MEDICATIONS: All medications reviewed and analyzed  MEDICATIONS  (STANDING):  aspirin enteric coated 81 milliGRAM(s) Oral daily  atorvastatin 80 milliGRAM(s) Oral at bedtime  clopidogrel Tablet 75 milliGRAM(s) Oral daily  finasteride 5 milliGRAM(s) Oral daily  lidocaine 1% Injectable 15 milliLiter(s) Local Injection once  tamsulosin 0.4 milliGRAM(s) Oral at bedtime    MEDICATIONS  (PRN):          Neuro: A+O x 3, non-focal, speech clear and intact  HEENT: PERRL, EOMI, oral mucosa pink and moist  Neck: supple, no JVD  CV: regular rate, regular rhythm, +S1S2, no murmurs or rub  Pulm/chest: lung sounds CTA and equal bilaterally, no accessory muscle use noted  Abd: soft, NT, ND, +BS  Ext: FREDERICK x 4, no C/C/E  Skin: warm, well perfused       Case including assessment/plan of care discussed with   CT surgery attending.  Critical Care time:   35   minutes of noncontinuos critical care time spent evaluating/treating, reviewing imaging, labs, discussing case with multidisciplinary team, discussing plans/goals of care with patient/family to prevent further life threatening depreciation of the patient's condition. Non-inclusive is procedure time.       88yMale with PMH     TAVR, open femoral artery approach      PAST MEDICAL & SURGICAL HISTORY:  Coronary artery disease: status post cardiac stents June 2020  Sinus bradycardia  Former tobacco use: quit 1984  Hyperlipidemia, mild  HTN (hypertension)  Moderate mitral regurgitation  AF (atrial fibrillation)  Severe aortic stenosis  S/P cataract surgery  H/O hernia repair

## 2020-07-26 NOTE — DIETITIAN INITIAL EVALUATION ADULT. - OTHER INFO
Pt is a 88 year old male, PMH significant for MI, HTN, A-fib, CAD, HLD and severe aortic stenosis, S/P TAVR, open femoral artery approach. Post op pt complaining of sore throat, Pt s/p swallow evaluation; +Dysphagia noted; diet advanced to Dysphagia 1 puree with nectar liquids. Pt is tolerating Puree diet with fair intake, agreeable to trying Magic CUP to increase caloric and protein intake. Pt reports having decreased appetite over past few months, Pt reports #, indicating 21# wt loss. NFPE conducted.

## 2020-07-26 NOTE — DIETITIAN INITIAL EVALUATION ADULT. - MALNUTRITION
Severe-Chronic NFPE: Severe muscle mass loss in temple, shoulder, clavicle, and severe fat loss in orbital, buccal, triceps, thoracic regions Severe-Chronic

## 2020-07-26 NOTE — DIETITIAN INITIAL EVALUATION ADULT. - ETIOLOGY
Related to in adequate protein energy intake with increased needs in setting severe aortic stenosis, S/P TAVR

## 2020-07-26 NOTE — PROGRESS NOTE ADULT - ASSESSMENT
87 yo M PMH MI , HTN, A-Fib, CAD, HLD, Severe AS now s/p TAVR (, Commercial 23mm Reeves Marybel 3 Ultra) on 7/24.    7/24 Transfemoral TAVR via Right Common Femoral Artery Cutdown.  NCT# [id number], STS/ACC TVT Registry Patient ID# [id number].  - immediate post op pt with bardycardia in the 50's. EP consulted and pt recommended for PPM. Pt will undergo PPM monday 7/27

## 2020-07-26 NOTE — DIETITIAN INITIAL EVALUATION ADULT. - PERTINENT LABORATORY DATA
07-26 Na134 mmol/L<L> Glu 106 mg/dL<H> K+ 4.2 mmol/L Cr  0.93 mg/dL BUN 20.0 mg/dL Phos n/a   Alb n/a   PAB n/a     n/a  HgbA1C

## 2020-07-26 NOTE — PROGRESS NOTE ADULT - PROBLEM SELECTOR PLAN 3
pt now s/p TAVR  resume Hold for SBP less  than 100or HR lesst montiel 60 once PPM implanted   lasix prn

## 2020-07-27 LAB
ANION GAP SERPL CALC-SCNC: 11 MMOL/L — SIGNIFICANT CHANGE UP (ref 5–17)
APPEARANCE UR: CLEAR — SIGNIFICANT CHANGE UP
APTT BLD: 32.6 SEC — SIGNIFICANT CHANGE UP (ref 27.5–35.5)
BACTERIA # UR AUTO: ABNORMAL
BILIRUB UR-MCNC: NEGATIVE — SIGNIFICANT CHANGE UP
BLD GP AB SCN SERPL QL: SIGNIFICANT CHANGE UP
BUN SERPL-MCNC: 27 MG/DL — HIGH (ref 8–20)
CALCIUM SERPL-MCNC: 8.6 MG/DL — SIGNIFICANT CHANGE UP (ref 8.6–10.2)
CHLORIDE SERPL-SCNC: 96 MMOL/L — LOW (ref 98–107)
CO2 SERPL-SCNC: 25 MMOL/L — SIGNIFICANT CHANGE UP (ref 22–29)
COLOR SPEC: ABNORMAL
COMMENT - URINE: SIGNIFICANT CHANGE UP
CREAT SERPL-MCNC: 0.85 MG/DL — SIGNIFICANT CHANGE UP (ref 0.5–1.3)
DIFF PNL FLD: ABNORMAL
EPI CELLS # UR: SIGNIFICANT CHANGE UP
GLUCOSE SERPL-MCNC: 128 MG/DL — HIGH (ref 70–99)
GLUCOSE UR QL: NEGATIVE MG/DL — SIGNIFICANT CHANGE UP
GRAN CASTS # UR COMP ASSIST: ABNORMAL /LPF
HCT VFR BLD CALC: 37.7 % — LOW (ref 39–50)
HGB BLD-MCNC: 12.4 G/DL — LOW (ref 13–17)
HYALINE CASTS # UR AUTO: ABNORMAL /LPF
INR BLD: 1.15 RATIO — SIGNIFICANT CHANGE UP (ref 0.88–1.16)
KETONES UR-MCNC: NEGATIVE — SIGNIFICANT CHANGE UP
LEUKOCYTE ESTERASE UR-ACNC: ABNORMAL
MAGNESIUM SERPL-MCNC: 2 MG/DL — SIGNIFICANT CHANGE UP (ref 1.6–2.6)
MCHC RBC-ENTMCNC: 30.5 PG — SIGNIFICANT CHANGE UP (ref 27–34)
MCHC RBC-ENTMCNC: 32.9 GM/DL — SIGNIFICANT CHANGE UP (ref 32–36)
MCV RBC AUTO: 92.6 FL — SIGNIFICANT CHANGE UP (ref 80–100)
NITRITE UR-MCNC: NEGATIVE — SIGNIFICANT CHANGE UP
PH UR: 6 — SIGNIFICANT CHANGE UP (ref 5–8)
PLATELET # BLD AUTO: 94 K/UL — LOW (ref 150–400)
POTASSIUM SERPL-MCNC: 4.1 MMOL/L — SIGNIFICANT CHANGE UP (ref 3.5–5.3)
POTASSIUM SERPL-SCNC: 4.1 MMOL/L — SIGNIFICANT CHANGE UP (ref 3.5–5.3)
PROCALCITONIN SERPL-MCNC: 0.27 NG/ML — HIGH (ref 0.02–0.1)
PROT UR-MCNC: 100 MG/DL
PROTHROM AB SERPL-ACNC: 13.2 SEC — SIGNIFICANT CHANGE UP (ref 10.6–13.6)
RBC # BLD: 4.07 M/UL — LOW (ref 4.2–5.8)
RBC # FLD: 15.6 % — HIGH (ref 10.3–14.5)
RBC CASTS # UR COMP ASSIST: ABNORMAL /HPF (ref 0–4)
SODIUM SERPL-SCNC: 132 MMOL/L — LOW (ref 135–145)
SP GR SPEC: 1.01 — SIGNIFICANT CHANGE UP (ref 1.01–1.02)
UROBILINOGEN FLD QL: 1 MG/DL
WBC # BLD: 8.46 K/UL — SIGNIFICANT CHANGE UP (ref 3.8–10.5)
WBC # FLD AUTO: 8.46 K/UL — SIGNIFICANT CHANGE UP (ref 3.8–10.5)
WBC UR QL: ABNORMAL

## 2020-07-27 PROCEDURE — 99222 1ST HOSP IP/OBS MODERATE 55: CPT

## 2020-07-27 PROCEDURE — 99233 SBSQ HOSP IP/OBS HIGH 50: CPT

## 2020-07-27 RX ORDER — LACTOBACILLUS ACIDOPHILUS 100MM CELL
1 CAPSULE ORAL
Refills: 0 | Status: DISCONTINUED | OUTPATIENT
Start: 2020-07-27 | End: 2020-07-31

## 2020-07-27 RX ORDER — MEROPENEM 1 G/30ML
1000 INJECTION INTRAVENOUS EVERY 8 HOURS
Refills: 0 | Status: DISCONTINUED | OUTPATIENT
Start: 2020-07-27 | End: 2020-07-29

## 2020-07-27 RX ORDER — ALBUMIN HUMAN 25 %
250 VIAL (ML) INTRAVENOUS ONCE
Refills: 0 | Status: COMPLETED | OUTPATIENT
Start: 2020-07-27 | End: 2020-07-27

## 2020-07-27 RX ORDER — VANCOMYCIN HCL 1 G
500 VIAL (EA) INTRAVENOUS EVERY 12 HOURS
Refills: 0 | Status: DISCONTINUED | OUTPATIENT
Start: 2020-07-27 | End: 2020-07-29

## 2020-07-27 RX ORDER — ACETAMINOPHEN 500 MG
650 TABLET ORAL EVERY 6 HOURS
Refills: 0 | Status: DISCONTINUED | OUTPATIENT
Start: 2020-07-27 | End: 2020-07-31

## 2020-07-27 RX ADMIN — SODIUM CHLORIDE 3 MILLILITER(S): 9 INJECTION INTRAMUSCULAR; INTRAVENOUS; SUBCUTANEOUS at 14:22

## 2020-07-27 RX ADMIN — MEROPENEM 100 MILLIGRAM(S): 1 INJECTION INTRAVENOUS at 21:17

## 2020-07-27 RX ADMIN — Medication 125 MILLILITER(S): at 20:48

## 2020-07-27 RX ADMIN — Medication 81 MILLIGRAM(S): at 13:14

## 2020-07-27 RX ADMIN — Medication 1 TABLET(S): at 17:20

## 2020-07-27 RX ADMIN — Medication 650 MILLIGRAM(S): at 13:17

## 2020-07-27 RX ADMIN — MEROPENEM 100 MILLIGRAM(S): 1 INJECTION INTRAVENOUS at 14:41

## 2020-07-27 RX ADMIN — FINASTERIDE 5 MILLIGRAM(S): 5 TABLET, FILM COATED ORAL at 13:14

## 2020-07-27 RX ADMIN — Medication 100 MILLIGRAM(S): at 17:20

## 2020-07-27 RX ADMIN — ATORVASTATIN CALCIUM 80 MILLIGRAM(S): 80 TABLET, FILM COATED ORAL at 21:17

## 2020-07-27 RX ADMIN — CLOPIDOGREL BISULFATE 75 MILLIGRAM(S): 75 TABLET, FILM COATED ORAL at 13:14

## 2020-07-27 RX ADMIN — TAMSULOSIN HYDROCHLORIDE 0.4 MILLIGRAM(S): 0.4 CAPSULE ORAL at 21:17

## 2020-07-27 RX ADMIN — SODIUM CHLORIDE 3 MILLILITER(S): 9 INJECTION INTRAMUSCULAR; INTRAVENOUS; SUBCUTANEOUS at 21:00

## 2020-07-27 RX ADMIN — SODIUM CHLORIDE 3 MILLILITER(S): 9 INJECTION INTRAMUSCULAR; INTRAVENOUS; SUBCUTANEOUS at 06:11

## 2020-07-27 NOTE — PROGRESS NOTE ADULT - EXTREMITIES COMMENTS
tenderness to palpation of left groin   groin soft, without hematoma   dressing clean dry and intact

## 2020-07-27 NOTE — CDI QUERY NOTE - NSCDIOTHERTXTBX_GEN_ALL_CORE_HH
The patient received a nutrition assessment by a Registered Dietician on 7/26/20.  The documentation of this assessment states: Severe Protein Calorie Malnutrition    Findings as based on:  •  Comprehensive nutrition assessment and consultation  •  Calorie counts (nutrient intake analysis)  •  Food acceptance and intake status from observations by staff  •  Follow up  •  Patient education  •  Intervention secondary to interdisciplinary rounds  •   concerns      Treatment:    The following diet has been recommended:    Ensure TID          Please specify the clinical significance of these findings based on your clinical judgement such as:    -	Severe Protein Calorie Malnutrition  -	Other (please specify)  -	Clinically unable to be determined      Please document your response in your progress notes and/or discharge summary as appropriate.

## 2020-07-27 NOTE — PROGRESS NOTE ADULT - ASSESSMENT
87 yo M PMH MI , HTN, A-Fib, CAD, HLD, Severe AS now s/p TAVR (, Commercial 23mm Reeves Marybel 3 Ultra) on 7/24.    7/24 Transfemoral TAVR via Right Common Femoral Artery Cutdown.  NCT# [id number], STS/ACC TVT Registry Patient ID# [id number].  - immediate post op pt with bardycardia in the 50's. EP consulted and pt recommended for PPM. Pt will undergo PPM monday 7/27- underlying rhythm checked - slow afib vs bradycardia in the 30s; will cont to hold betablockers,   - other issue of note- patient with urinary retention post op; will guero gonzalez at this time

## 2020-07-27 NOTE — CHART NOTE - NSCHARTNOTEFT_GEN_A_CORE
Micra AV implant deferred today due to rectal temperature of 101.4.  Plan & fever work up per Dr Carney/CTSx team.   npo after midnight for possible implant tomorrow.  am labs CBC, BMP, mag, type and screen     DW pt, Elijah Catalan NP and Dr Fournier

## 2020-07-27 NOTE — PROGRESS NOTE ADULT - SUBJECTIVE AND OBJECTIVE BOX
SUBJECTIVE   "do you think i need the Pacemaker"   questions answered and supportive counseling offered to patient     INTERIM HISTORY SIGNIFICANT FOR   patient s.p TAVR significant for slow afib in the 30s   currently pending PPM in AM     urinary retention post TAVR - on home dose flomax and proscar       Patient is a 88y old  Male who presents with a chief complaint of s/p TAVR (26 Jul 2020 02:13)    HPI:  88 year old male alert and oriented to time, place, situation presents accompanied by his daughter who offers additional information.    PMH significant for MI, HTN, A-fib, CAD, HLD and severe aortic stenosis. He is planning to undergo elective TAVR on July 24, 2020 with Dr. Carney.    Patient reports dyspnea on moderate exertion and fatigue which has become significantly worse over the past three weeks.    Pre operative cardiac cath. revealed obstructive CAD and is now s/p PCI to the LAD (June 2020). The patient has residual disease in the left circumflex and is planning to have a staged intervention once the TAVR is complete.    He denies any angina, palpitations, dizziness, syncope, shortness of breath at rest, orthopnea and PND.      His daughter reports that he does snore and at times has cessation of breathing during sleep however the patient has never been tested for NEREIDA. (15 Jul 2020 12:03)    OBJECTIVE  PAST MEDICAL & SURGICAL HISTORY:  Coronary artery disease: status post cardiac stents June 2020  Sinus bradycardia  Former tobacco use: quit 1984  Hyperlipidemia, mild  HTN (hypertension)  Moderate mitral regurgitation  AF (atrial fibrillation)  Severe aortic stenosis  S/P cataract surgery  H/O hernia repair    Magic Cup and ice cream TID RD OK (Unknown)  No Known Allergies    Home Medications:  aspirin 81 mg oral delayed release tablet: 1 tab(s) orally once a day (24 Jul 2020 06:04)  Eliquis 2.5 mg oral tablet: 1 tab(s) orally 2 times a day (24 Jul 2020 06:04)  finasteride 5 mg oral tablet: 1 tab(s) orally once a day (24 Jul 2020 06:04)  rosuvastatin 40 mg oral tablet: 1 tab(s) orally once a day (24 Jul 2020 06:04)  tamsulosin 0.4 mg oral capsule: 1 cap(s) orally once a day (24 Jul 2020 06:04)  valsartan 320 mg oral tablet: 1 tab(s) orally once a day (24 Jul 2020 06:04)  Zetia 10 mg oral tablet: 1 tab(s) orally once a day (24 Jul 2020 06:04)    VITALS  Currently in sinus rhythm with vitals as below  ICU Vital Signs Last 24 Hrs  T(C): 36.3 (26 Jul 2020 16:00), Max: 37.4 (26 Jul 2020 08:00)  T(F): 97.3 (26 Jul 2020 16:00), Max: 99.4 (26 Jul 2020 08:00)  HR: 60 (27 Jul 2020 00:00) (59 - 60)  BP: 145/65 (27 Jul 2020 00:00) (88/51 - 177/73)  BP(mean): 93 (27 Jul 2020 00:00) (63 - 110)  RR: 14 (27 Jul 2020 00:00) (12 - 30)  SpO2: 98% (27 Jul 2020 00:00) (73% - 98%)    LABS                        12.0   13.53 )-----------( 147      ( 25 Jul 2020 02:30 )             36.4   PT/INR - ( 25 Jul 2020 02:30 )   PT: 16.1 sec;   INR: 1.41 ratio         PTT - ( 25 Jul 2020 02:30 )  PTT:29.5 [id number]    134<L>  |  97<L>  |  20.0  ----------------------------<  106<H>  4.2   |  28.0  |  0.93    Ca    8.8      26 Jul 2020 07:04  Phos  2.8     07-25  Mg     2.2     07-25    TPro  5.8<L>  /  Alb  3.3  /  TBili  0.8  /  DBili  0.2  /  AST  53<H>  /  ALT  55<H>  /  AlkPhos  46  07-25  CAPILLARY BLOOD GLUCOSE              IN/OUT    07-25-20 @ 07:01  -  07-26-20 @ 07:00  --------------------------------------------------------  IN: 50 mL / OUT: 1840 mL / NET: -1790 mL    07-26-20 @ 07:01  -  07-27-20 @ 00:53  --------------------------------------------------------  IN: 970 mL / OUT: 620 mL / NET: 350 mL      IMAGING  personally reviewed imaging   Xray Chest 1 View- PORTABLE-Routine:    EXAM:  XR CHEST PORTABLE ROUTINE 1V                          PROCEDURE DATE:  07/26/2020          INTERPRETATION:  Portable chest radiograph    CLINICAL INFORMATION:   Status post cardiac surgery.    TECHNIQUE:  Portable  AP view of the chest was obtained.    COMPARISON: 7/25/2020 chest radiograph available for review.    FINDINGS:  The lungs are clear of airspace consolidations or effusions. No pneumothorax.  RIGHT subclavian pacemaker wire lead within RIGHT ventricle.  The heart and mediastinum are within normal limits.  Status post cardiac valve replacement.  Visualized osseous structures are intact.        IMPRESSION:   No evidence of active chest disease.                    DEANA MIRZA M.D., ATTENDING RADIOLOGIST  This document has been electronically signed. Jul 26 2020 12:08PM               (07-26-20 @ 03:01)    CURRENT MEDICATIONS  MEDICATIONS  (STANDING):  aspirin enteric coated 81 milliGRAM(s) Oral daily  atorvastatin 80 milliGRAM(s) Oral at bedtime  clopidogrel Tablet 75 milliGRAM(s) Oral daily  finasteride 5 milliGRAM(s) Oral daily  sodium chloride 0.9% lock flush 3 milliLiter(s) IV Push every 8 hours  tamsulosin 0.4 milliGRAM(s) Oral at bedtime    MEDICATIONS  (PRN):

## 2020-07-27 NOTE — CONSULT NOTE ADULT - SUBJECTIVE AND OBJECTIVE BOX
[facility]  INFECTIOUS DISEASES AND INTERNAL MEDICINE at [place]  =======================================================  Tin Monahan MD  Diplomates American Board of Internal Medicine and Infectious Diseases  Tel  [phone]  Fax [phone]  =======================================================    MRN-[id number]  CLARISSE GUTIERRES   HPI:  88 year old male alert and oriented to time, place, situation presents accompanied by his daughter who offers additional information.  PMH significant for MI, HTN, A-fib, CAD, HLD and severe aortic stenosis. He is planning to undergo elective TAVR on July 24, 2020 with Dr. Carney.  Patient reports dyspnea on moderate exertion and fatigue which has become significantly worse over the past three weeks.  Pre operative cardiac cath. revealed obstructive CAD and is now s/p PCI to the LAD (June 2020). The patient has residual disease in the left circumflex and is planning to have a staged intervention once the TAVR is complete.  He denies any angina, palpitations, dizziness, syncope, shortness of breath at rest, orthopnea and PND.    His daughter reports that he does snore and at times has cessation of breathing during sleep however the patient has never been tested for NEREIDA. (15 Jul 2020 12:03)    He was admitted 7/24/20, and is s/p Transfemoral TAVR via Right Common Femoral Artery Cutdown. NCT# [id number], STS/ACC TVT Registry Patient ID# [id number].  Post operatively. he was noted with bradycardia in the 50's. EP consulted and pt recommended for PPM, planned for today 7/27- underlying rhythm checked - slow afib vs bradycardia in the 30s; will cont to hold beta-blockers,     Further noted to have urinary retention post op, requiring Pederson placement.    This AM, patient was found with a fever of 101.2.  he has no real complaints, except for some chills.     Denies cough.     Recent CXR w/o evidence of active chest disease.     I have personally reviewed the labs and data; pertinent labs and data are listed in this note; please see below.   =======================================================  Past Medical & Surgical Hx:  =====================  PAST MEDICAL & SURGICAL HISTORY:  Coronary artery disease: status post cardiac stents June 2020  Sinus bradycardia  Former tobacco use: quit 1984  Hyperlipidemia, mild  HTN (hypertension)  Moderate mitral regurgitation  AF (atrial fibrillation)  Severe aortic stenosis  S/P cataract surgery  H/O hernia repair    Problem List:  ==========  HEALTH ISSUES - PROBLEM Dx:  Chronic diastolic congestive heart failure, NYHA class 3: Chronic diastolic congestive heart failure, NYHA class 3  Coronary artery disease involving native coronary artery of native heart without angina pectoris: Coronary artery disease involving native coronary artery of native heart without angina pectoris  Need for prophylactic measure  Presence of coronary angioplasty implant and graft  AF (atrial fibrillation)  Coronary artery disease: status post cardiac stents June 2020  HTN (hypertension)  Severe aortic stenosis      Social Hx:  =======  no toxic habits currently    FAMILY HISTORY:  FH: heart disease: mother and father both passed in 60&#x27;s  no significant family history of immunosuppressive disorders in mother or father   =======================================================  REVIEW OF SYSTEMS:  CONSTITUTIONAL:   FEVER  HEENT:  No diplopia or blurred vision.  No earache, sore throat or runny nose.  CARDIOVASCULAR:  No pressure, squeezing, strangling, tightness, heaviness or aching about the chest, neck, axilla or epigastrium.  RESPIRATORY:  No cough, shortness of breath  GASTROINTESTINAL:  No nausea, vomiting or diarrhea.  GENITOURINARY:  No dysuria, frequency or urgency. No Blood in urine  MUSCULOSKELETAL:  no joint aches, no muscle pain  SKIN:  No change in skin, hair or nails.  NEUROLOGIC:  No Headaches, seizures or weakness.  PSYCHIATRIC:  No disorder of thought or mood.  ENDOCRINE:  No heat or cold intolerance  HEMATOLOGICAL:  No easy bruising or bleeding.   =======================================================  Allergies  No Known Allergies    Magic Cup and ice cream TID RD OK (Unknown)  Antibiotics:  meropenem  IVPB 1000 milliGRAM(s) IV Intermittent every 8 hours  vancomycin  IVPB 500 milliGRAM(s) IV Intermittent every 12 hours    Other medications:  aspirin enteric coated 81 milliGRAM(s) Oral daily  atorvastatin 80 milliGRAM(s) Oral at bedtime  clopidogrel Tablet 75 milliGRAM(s) Oral daily  finasteride 5 milliGRAM(s) Oral daily  sodium chloride 0.9% lock flush 3 milliLiter(s) IV Push every 8 hours  tamsulosin 0.4 milliGRAM(s) Oral at bedtime   cefuroxime  IVPB   100 mL/Hr IV Intermittent (07-24-20 @ 16:15)   100 mL/Hr IV Intermittent (07-25-20 @ 00:09)   100 mL/Hr IV Intermittent (07-25-20 @ 07:51)      ======================================================  Physical Exam:  ============  T(F): 97.3 (26 Jul 2020 16:00), Max: 97.3 (26 Jul 2020 16:00)  HR: 60 (27 Jul 2020 11:00)  BP: 120/55 (27 Jul 2020 11:00)  RR: 23 (27 Jul 2020 11:00)  SpO2: 95% (27 Jul 2020 11:00) (73% - 98%)  temp max in last 48H T(F): , Max: 99.6 (07-26-20 @ 00:00)    General:  No acute distress.  THIN  Eye: Pupils are equal, round and reactive to light, Extraocular movements are intact, Normal conjunctiva.  HENT: Normocephalic, Oral mucosa is moist, No pharyngeal erythema, No sinus tenderness.  Neck: Supple, No lymphadenopathy.  Respiratory: Lungs with fair air entry  Cardiovascular: Normal rate, Regular rhythm,   RIGHT IJ with Pacing wire;  GAUZE with old bloody fluid  Gastrointestinal: Soft, Non-tender, Non-distended, Normal bowel sounds.  Genitourinary: No costovertebral angle tenderness.  + PEDERSON with concentrated fluid  Lymphatics: No lymphadenopathy neck,   Musculoskeletal: Normal range of motion, Normal strength.  Integumentary: No rash.  RIGHT FEMORAL INCISION SITE with scab and STERI-STRIPS in place; no significant erythema or induration noted.   Neurologic: Alert, Oriented, No focal deficits, Cranial Nerves II-XII are grossly intact.  Psychiatric: Appropriate mood & affect.    =======================================================  Labs:                        12.4   8.46  )-----------( 94       ( 27 Jul 2020 02:52 )             37.7      07-27    132<L>  |  96<L>  |  27.0<H>  ----------------------------<  128<H>  4.1   |  25.0  |  0.85    Ca    8.6      27 Jul 2020 02:52  Mg     2.0     07-27    Culture - Urine (collected 07-15-20 @ 21:34)  Source: .Urine Clean Catch (Midstream)  Final Report (07-16-20 @ 17:10):    <10,000 CFU/mL Normal Urogenital Chata    Creatinine, Serum: 0.85 mg/dL (07-27-20 @ 02:52)  Creatinine, Serum: 0.93 mg/dL (07-26-20 @ 07:04)  Creatinine, Serum: 1.01 mg/dL (07-25-20 @ 08:41)  Creatinine, Serum: 1.10 mg/dL (07-25-20 @ 02:30)  Creatinine, Serum: 1.12 mg/dL (07-24-20 @ 10:14)

## 2020-07-27 NOTE — CONSULT NOTE ADULT - ASSESSMENT
88 year old male alert and oriented to time, place, situation presents accompanied by his daughter who offers additional information.  PMH significant for MI, HTN, A-fib, CAD, HLD and severe aortic stenosis. He is planning to undergo elective TAVR on July 24, 2020 with Dr. Carney.  Patient reports dyspnea on moderate exertion and fatigue which has become significantly worse over the past three weeks.  Pre operative cardiac cath. revealed obstructive CAD and is now s/p PCI to the LAD (June 2020). The patient has residual disease in the left circumflex and is planning to have a staged intervention once the TAVR is complete.  He denies any angina, palpitations, dizziness, syncope, shortness of breath at rest, orthopnea and PND.    His daughter reports that he does snore and at times has cessation of breathing during sleep however the patient has never been tested for NEREIDA. (15 Jul 2020 12:03)    He was admitted 7/24/20, and is s/p Transfemoral TAVR via Right Common Femoral Artery Cutdown. NCT# [id number], STS/ACC TVT Registry Patient ID# [id number].  Post operatively. he was noted with bradycardia in the 50's. EP consulted and pt recommended for PPM, planned for today 7/27- underlying rhythm checked - slow afib vs bradycardia in the 30s; will cont to hold beta-blockers,     Further noted to have urinary retention post op, requiring Gonzalez placement.    This AM, patient was found with a fever of 101.2.  he has no real complaints, except for some chills.     Denies cough.       Impression:  Fevers  s/p TAVR   Temporary pacemaker in place  bradycardia, pending permanent pacemaker  urinary retention requiring gonzalez     Plan:  - fevers should be worked up:   in view of post TAVR status and had temporary pacemaker in place   also has gonzalez in place for urinary retention    RIGHT femoral site does not appear infected  RIGHT IJ catheter site with increase drainage onto gauze - should watch closely.    - blood cx x 2 sets ordered  - urine cultures ordered    at this time, will start empiric Antibiotics:  meropenem  IVPB 1000 milliGRAM(s) IV Intermittent every 8 hours  vancomycin  IVPB 500 milliGRAM(s) IV Intermittent every 12 hours        - follow up all outstanding cultures  - trend temperature and WBC curve  - repeat cultures from blood and all sources if febrile.

## 2020-07-28 LAB
ANION GAP SERPL CALC-SCNC: 11 MMOL/L — SIGNIFICANT CHANGE UP (ref 5–17)
BUN SERPL-MCNC: 31 MG/DL — HIGH (ref 8–20)
CALCIUM SERPL-MCNC: 8.8 MG/DL — SIGNIFICANT CHANGE UP (ref 8.6–10.2)
CHLORIDE SERPL-SCNC: 99 MMOL/L — SIGNIFICANT CHANGE UP (ref 98–107)
CO2 SERPL-SCNC: 26 MMOL/L — SIGNIFICANT CHANGE UP (ref 22–29)
CREAT SERPL-MCNC: 0.94 MG/DL — SIGNIFICANT CHANGE UP (ref 0.5–1.3)
CULTURE RESULTS: NO GROWTH — SIGNIFICANT CHANGE UP
GLUCOSE SERPL-MCNC: 118 MG/DL — HIGH (ref 70–99)
HCT VFR BLD CALC: 40.3 % — SIGNIFICANT CHANGE UP (ref 39–50)
HGB BLD-MCNC: 12.9 G/DL — LOW (ref 13–17)
MAGNESIUM SERPL-MCNC: 2 MG/DL — SIGNIFICANT CHANGE UP (ref 1.6–2.6)
MCHC RBC-ENTMCNC: 30.1 PG — SIGNIFICANT CHANGE UP (ref 27–34)
MCHC RBC-ENTMCNC: 32 GM/DL — SIGNIFICANT CHANGE UP (ref 32–36)
MCV RBC AUTO: 94.2 FL — SIGNIFICANT CHANGE UP (ref 80–100)
PLATELET # BLD AUTO: 108 K/UL — LOW (ref 150–400)
POTASSIUM SERPL-MCNC: 4.2 MMOL/L — SIGNIFICANT CHANGE UP (ref 3.5–5.3)
POTASSIUM SERPL-SCNC: 4.2 MMOL/L — SIGNIFICANT CHANGE UP (ref 3.5–5.3)
RBC # BLD: 4.28 M/UL — SIGNIFICANT CHANGE UP (ref 4.2–5.8)
RBC # FLD: 15.6 % — HIGH (ref 10.3–14.5)
SODIUM SERPL-SCNC: 136 MMOL/L — SIGNIFICANT CHANGE UP (ref 135–145)
SPECIMEN SOURCE: SIGNIFICANT CHANGE UP
WBC # BLD: 8.49 K/UL — SIGNIFICANT CHANGE UP (ref 3.8–10.5)
WBC # FLD AUTO: 8.49 K/UL — SIGNIFICANT CHANGE UP (ref 3.8–10.5)

## 2020-07-28 PROCEDURE — 99232 SBSQ HOSP IP/OBS MODERATE 35: CPT

## 2020-07-28 PROCEDURE — 71045 X-RAY EXAM CHEST 1 VIEW: CPT | Mod: 26

## 2020-07-28 RX ORDER — PHENYLEPHRINE-SHARK LIVER OIL-MINERAL OIL-PETROLATUM RECTAL OINTMENT
1 OINTMENT (GRAM) RECTAL THREE TIMES A DAY
Refills: 0 | Status: DISCONTINUED | OUTPATIENT
Start: 2020-07-28 | End: 2020-07-31

## 2020-07-28 RX ADMIN — Medication 100 MILLIGRAM(S): at 05:40

## 2020-07-28 RX ADMIN — FINASTERIDE 5 MILLIGRAM(S): 5 TABLET, FILM COATED ORAL at 11:10

## 2020-07-28 RX ADMIN — SODIUM CHLORIDE 3 MILLILITER(S): 9 INJECTION INTRAMUSCULAR; INTRAVENOUS; SUBCUTANEOUS at 21:20

## 2020-07-28 RX ADMIN — TAMSULOSIN HYDROCHLORIDE 0.4 MILLIGRAM(S): 0.4 CAPSULE ORAL at 21:21

## 2020-07-28 RX ADMIN — CLOPIDOGREL BISULFATE 75 MILLIGRAM(S): 75 TABLET, FILM COATED ORAL at 11:10

## 2020-07-28 RX ADMIN — SODIUM CHLORIDE 3 MILLILITER(S): 9 INJECTION INTRAMUSCULAR; INTRAVENOUS; SUBCUTANEOUS at 05:47

## 2020-07-28 RX ADMIN — Medication 100 MILLIGRAM(S): at 17:24

## 2020-07-28 RX ADMIN — PHENYLEPHRINE-SHARK LIVER OIL-MINERAL OIL-PETROLATUM RECTAL OINTMENT 1 APPLICATION(S): at 17:25

## 2020-07-28 RX ADMIN — Medication 1 TABLET(S): at 17:24

## 2020-07-28 RX ADMIN — MEROPENEM 100 MILLIGRAM(S): 1 INJECTION INTRAVENOUS at 21:20

## 2020-07-28 RX ADMIN — ATORVASTATIN CALCIUM 80 MILLIGRAM(S): 80 TABLET, FILM COATED ORAL at 21:21

## 2020-07-28 RX ADMIN — Medication 81 MILLIGRAM(S): at 11:10

## 2020-07-28 RX ADMIN — SODIUM CHLORIDE 3 MILLILITER(S): 9 INJECTION INTRAMUSCULAR; INTRAVENOUS; SUBCUTANEOUS at 14:00

## 2020-07-28 RX ADMIN — MEROPENEM 100 MILLIGRAM(S): 1 INJECTION INTRAVENOUS at 07:00

## 2020-07-28 RX ADMIN — MEROPENEM 100 MILLIGRAM(S): 1 INJECTION INTRAVENOUS at 13:33

## 2020-07-28 NOTE — PROGRESS NOTE ADULT - ASSESSMENT
88 year old male alert and oriented to time, place, situation presents accompanied by his daughter who offers additional information.  PMH significant for MI, HTN, A-fib, CAD, HLD and severe aortic stenosis. He is planning to undergo elective TAVR on July 24, 2020 with Dr. Carney.  Patient reports dyspnea on moderate exertion and fatigue which has become significantly worse over the past three weeks.  Pre operative cardiac cath. revealed obstructive CAD and is now s/p PCI to the LAD (June 2020). The patient has residual disease in the left circumflex and is planning to have a staged intervention once the TAVR is complete.  He denies any angina, palpitations, dizziness, syncope, shortness of breath at rest, orthopnea and PND.    His daughter reports that he does snore and at times has cessation of breathing during sleep however the patient has never been tested for NEREIDA. (15 Jul 2020 12:03)    He was admitted 7/24/20, and is s/p Transfemoral TAVR via Right Common Femoral Artery Cutdown. NCT# [id number], STS/ACC TVT Registry Patient ID# [id number].  Post operatively. he was noted with bradycardia in the 50's. EP consulted and pt recommended for PPM, planned for today 7/27- underlying rhythm checked - slow afib vs bradycardia in the 30s; will cont to hold beta-blockers,     Further noted to have urinary retention post op, requiring Gonzalez placement.    7/27 AM, patient was found with a fever of 101.2.  he has no real complaints, except for some chills.   Denies cough.       Impression:  Fevers  s/p TAVR   Temporary pacemaker in place  bradycardia, pending permanent pacemaker  urinary retention requiring gonzalez     Plan:  - FEVERS IMPROVING.   LABS REVIEWED.  procalcitonin was mildly elevated.   RIGHT femoral site does not appear infected  RIGHT IJ catheter site with increase drainage onto gauze - should watch closely.    WILL FOLLOW UP ON CULTURES SENT 7/27/2020:  - blood cx x 2 sets   - urine culture     FOR NOW, MAINTAIN  Antibiotics:  meropenem  IVPB 1000 milliGRAM(s) IV Intermittent every 8 hours  vancomycin  IVPB 500 milliGRAM(s) IV Intermittent every 12 hours         PRELIMINARY CULTURES SHOULD BE BACK IN THE 48 HOUR [place].    IF NEGATIVE, AT THAT TIME,   CAN PROCEED WITH PLACEMENT OF PERMANENT PACEMAKER.   HIGH LIKELIHOOD OF A UTI IN THIS PATIENT.     IF NO MRSA IDENTIFIED IN BLOOD CULTURES BY 7/29/20 AM LAB REVIEW, WILL STOP VANCOMYCIN

## 2020-07-28 NOTE — PROGRESS NOTE ADULT - SUBJECTIVE AND OBJECTIVE BOX
[facility]  INFECTIOUS DISEASES AND INTERNAL MEDICINE at [place]  =======================================================  Tin Monahan MD  Diplomates American Board of Internal Medicine and Infectious Diseases  Tel  [phone]  Fax [phone]  =======================================================    [id number]  CLARISSE GUTIERRES   follow up:  fevers    lower on temp curve today:   T(F): 100.2 (28 Jul 2020 11:00), Max: 101.2 (27 Jul 2020 13:00)  working with Physical therapy this AM  Cultures in process.         =======================================================  REVIEW OF SYSTEMS:  CONSTITUTIONAL:   low grade temps  HEENT:  No diplopia or blurred vision.  No earache, sore throat or runny nose.  CARDIOVASCULAR:  No pressure, squeezing, strangling, tightness, heaviness or aching about the chest, neck, axilla or epigastrium.  RESPIRATORY:  No cough, shortness of breath  GASTROINTESTINAL:  No nausea, vomiting or diarrhea.  GENITOURINARY:  No dysuria, frequency or urgency. No Blood in urine  MUSCULOSKELETAL:  no joint aches, no muscle pain  SKIN:  No change in skin, hair or nails.  NEUROLOGIC:  No Headaches, seizures or weakness.  PSYCHIATRIC:  No disorder of thought or mood.  ENDOCRINE:  No heat or cold intolerance  HEMATOLOGICAL:  No easy bruising or bleeding.   =======================================================  Allergies  No Known Allergies    ======================================================  Physical Exam:  ============  (see vitals section below)    General:  No acute distress.  THIN  Eye: Pupils are equal, round and reactive to light, Extraocular movements are intact, Normal conjunctiva.  HENT: Normocephalic, Oral mucosa is moist, No pharyngeal erythema, No sinus tenderness.  Neck: Supple, No lymphadenopathy.  Respiratory: Lungs with fair air entry  Cardiovascular: Normal rate, Regular rhythm,   RIGHT IJ with Pacing wire;  GAUZE with dried blood  Gastrointestinal: Soft, Non-tender, Non-distended, Normal bowel sounds.  Genitourinary: No costovertebral angle tenderness.  + PEDERSON with some sediment.   Lymphatics: No lymphadenopathy neck,   Musculoskeletal: Normal range of motion, Normal strength.  Integumentary: No rash.  RIGHT FEMORAL INCISION SITE with scab and STERI-STRIPS in place; no significant erythema or induration noted.   Neurologic: Alert, Oriented, No focal deficits, Cranial Nerves II-XII are grossly intact.  Psychiatric: Appropriate mood & affect.    =======================================================   Vitals:  ============  T(F): 100.2 (28 Jul 2020 11:00), Max: 101.2 (27 Jul 2020 13:00)  HR: 60 (28 Jul 2020 11:00)  BP: 85/47 (28 Jul 2020 11:00)  RR: 14 (28 Jul 2020 11:00)  SpO2: 97% (28 Jul 2020 11:00) (96% - 99%)  temp max in last 48H T(F): , Max: 101.2 (07-27-20 @ 13:00)    =======================================================  Current Antibiotics:  meropenem  IVPB 1000 milliGRAM(s) IV Intermittent every 8 hours  vancomycin  IVPB 500 milliGRAM(s) IV Intermittent every 12 hours    Other medications:  aspirin enteric coated 81 milliGRAM(s) Oral daily  atorvastatin 80 milliGRAM(s) Oral at bedtime  clopidogrel Tablet 75 milliGRAM(s) Oral daily  finasteride 5 milliGRAM(s) Oral daily  lactobacillus acidophilus 1 Tablet(s) Oral two times a day  sodium chloride 0.9% lock flush 3 milliLiter(s) IV Push every 8 hours  tamsulosin 0.4 milliGRAM(s) Oral at bedtime      =======================================================  Labs:                        12.9   8.49  )-----------( 108      ( 28 Jul 2020 03:51 )             40.3      07-28    136  |  99  |  31.0<H>  ----------------------------<  118<H>  4.2   |  26.0  |  0.94    Ca    8.8      28 Jul 2020 03:51  Mg     2.0     07-28        Culture - Urine (collected 07-15-20 @ 21:34)  Source: .Urine Clean Catch (Midstream)  Final Report (07-16-20 @ 17:10):    <10,000 CFU/mL Normal Urogenital Chata      Creatinine, Serum: 0.94 mg/dL (07-28-20 @ 03:51)  Creatinine, Serum: 0.85 mg/dL (07-27-20 @ 02:52)  Creatinine, Serum: 0.93 mg/dL (07-26-20 @ 07:04)  Creatinine, Serum: 1.01 mg/dL (07-25-20 @ 08:41)  Creatinine, Serum: 1.10 mg/dL (07-25-20 @ 02:30)  Creatinine, Serum: 1.12 mg/dL (07-24-20 @ 10:14)    Procalcitonin, Serum: 0.27 ng/mL (07-27-20 @ 13:22)        WBC Count: 8.49 K/uL (07-28-20 @ 03:51)  WBC Count: 8.46 K/uL (07-27-20 @ 02:52)  WBC Count: 13.53 K/uL (07-25-20 @ 02:30)  WBC Count: 7.64 K/uL (07-24-20 @ 10:14)       Alkaline Phosphatase, Serum: 46 U/L (07-25-20 @ 02:30)  Alanine Aminotransferase (ALT/SGPT): 55 U/L (07-25-20 @ 02:30)  Aspartate Aminotransferase (AST/SGOT): 53 U/L (07-25-20 @ 02:30)  Bilirubin Total, Serum: 0.8 mg/dL (07-25-20 @ 02:30)  Bilirubin Direct, Serum: 0.2 mg/dL (07-25-20 @ 02:30)

## 2020-07-28 NOTE — PROGRESS NOTE ADULT - SUBJECTIVE AND OBJECTIVE BOX
Micra pacemaker implant canceled yesterday due to fever.   Afebrile this am, last temp 101.2,  @ 1pm. ID consulted.  Pt seen sitting in chair in CTICU, no complaints, no overnight events.    TELE: ventricular paced 60bpm     MEDICATIONS  (STANDING):  aspirin enteric coated 81 milliGRAM(s) Oral daily  atorvastatin 80 milliGRAM(s) Oral at bedtime  clopidogrel Tablet 75 milliGRAM(s) Oral daily  finasteride 5 milliGRAM(s) Oral daily  lactobacillus acidophilus 1 Tablet(s) Oral two times a day  meropenem  IVPB 1000 milliGRAM(s) IV Intermittent every 8 hours  sodium chloride 0.9% lock flush 3 milliLiter(s) IV Push every 8 hours  tamsulosin 0.4 milliGRAM(s) Oral at bedtime  vancomycin  IVPB 500 milliGRAM(s) IV Intermittent every 12 hours    Vital Signs Last 24 Hrs  T(C): 37.1 (2020 07:00), Max: 38.4 (2020 13:00)  T(F): 98.8 (2020 07:00), Max: 101.2 (2020 13:00)  HR: 60 (2020 08:00) (59 - 60)  BP: 108/57 (2020 08:00) (84/46 - 139/61)  BP(mean): 76 (2020 08:00) (60 - 90)  RR: 19 (2020 08:00) (15 - 26)  SpO2: 98% (2020 08:00) (95% - 99%)    Physical Exam:  Constitutional: NAD, AAOx3  Cardiovascular: +S1S2 RRR  RACW: +active fix lead connected to semi-perm box, dried heme on dressing, no bleeding, erythema, streaking or discharge   Pulmonary: decreased at bases     LABS:                        12.9   8.49  )-----------( 108      ( 2020 03:51 )             40.3         136  |  99  |  31.0<H>  ----------------------------<  118<H>  4.2   |  26.0  |  0.94    Ca    8.8      2020 03:51  Mg     2.0     07-28      PT/INR - ( 2020 02:52 )   PT: 13.2 sec;   INR: 1.15 ratio    PTT - ( 2020 02:52 )  PTT:32.6 sec    Urinalysis Basic - ( 2020 13:22 )    Color: Venita / Appearance: Clear / S.015 / pH: x  Gluc: x / Ketone: Negative  / Bili: Negative / Urobili: 1 mg/dL   Blood: x / Protein: 100 mg/dL / Nitrite: Negative   Leuk Esterase: Trace / RBC: 25-50 /HPF / WBC 6-10   Sq Epi: x / Non Sq Epi: Occasional / Bacteria: Few    blood cultures: pending     RADIOLOGY & ADDITIONAL TESTS:  CXR 20: The lungs are clear of airspace consolidations or effusions. No pneumothorax.  RIGHT subclavian pacemaker wire lead within RIGHT ventricle.  The heart and mediastinum are within normal limits.  Status post cardiac valve replacement.  Visualized osseous structures are intact.  IMPRESSION:   No evidence of active chest disease.    TTE 20:    1. Normal global left ventricular systolic function.   2. Left ventricular ejection fraction, by visual estimation, is 70 to 75%.   3. The mitral in-flow pattern reveals no discernable A-wave, therefore no comment on diastolic function can be made.   4. Severely enlarged left atrium.   5. Mild mitral annular calcification.   6. Mildly dilated RV size and normal function, pacing wire noted.   7. Thickening of the anterior and posterior mitral valve leaflets with mild regurgitation.   8. TAVR (Marybel valve) in the aortic position. There is no clear evidence of paravalvular leak. Peak velocity of 3.0 m/s mildly elevated, mean gradient of 14 mmHg, Dimensionless index of 0.45, which suggests normal functio lindsay prosthesis.   9. There is no evidence of pericardial effusion.  10. Compared to prior study from 20, intervalplacement of a TAVR with preserved LV systolic function/EF.  Christiano Jefferson DO Electronically signed on 2020 at 1:59:31 PM    < from: Cardiac Cath Lab - Adult (20 @ 08:25) >  CORONARY VESSELS: The coronary circulation is co-dominant.  LM:  --  LM: Angiography showed minor luminal irregularities with no flow  limiting lesions.  LAD:   --  Mid LAD: There was a tubular 95 % stenosis. The lesion was  eccentric and moderately calcified.  CX:   --  Proximal circumflex: There was a tubular 90 % stenosis. The  lesion was eccentric and moderately calcified.  --  Mid circumflex: There was a diffuse 50 % stenosis. The lesion was  irregularly contoured and heavily calcified.  RCA:   --  Ostial RCA: There was a 100 % stenosis. This lesion is a chronic  total occlusion.  COMPLICATIONS: There were no complications.  DIAGNOSTIC IMPRESSIONS: Moderate Pulmonary Hypertension. 2. Normal LV  function with LVEF of 65% on TTE from 2019. 3. Severe Aortic Stenosis  by TTE with a mean PG of 40mm Hgand PAOLO of 07cm2. 4. Triple vessel CAD.  5. Successful PCI to Proximal to mid LAD with ZESsx3.  DIAGNOSTIC RECOMMENDATIONS: Plavix for at least 6 months. 2. Aspirin 81mg  for 2 weeks, then to resume after Plavix is discontinued. 3. May resume  Eliquis tonite or in AM. 4. PPI or [id number] for GI protection. 5. CT  surgical evaluation for ZACARIAS. 6. Staged PCI to LCX after the ZACARIAS.  INTERVENTIONAL IMPRESSIONS: Moderate Pulmonary Hypertension. 2. Normal LV  function with LVEF of 65% on TTE from 2019. 3. Severe Aortic Stenosis  by TTE with a mean PG of 40mm Hg and PAOLO of 07cm2. 4. Triple vessel CAD.  5. Successful PCI to Proximal to mid LAD with ZESsx3.  INTERVENTIONAL RECOMMENDATIONS: Plavix for at least 6 months. 2. Aspirin  81mg for 2 weeks, then to resume after Plavix is discontinued. 3. May  resume Eliquis tonite or in AM. 4. PPI or [id number] for GI protection. 5.  CT surgical evaluation for ZACARIAS. 6. Staged PCI to LCX after the ZACARIAS.      A/P: 88 year old male h/o HTN, HLD, CAD s/p PCI to the LAD 2020, with residual 90% LCx awaiting staged intervention and 100% ostial RCA , previously paroxysmal atrial fibrillation (last documented sinus rhythm 2020), baseline 1st deg AV block and RBBB, and severe aortic stenosis now s/p elective TAVR via right femoral artery cutdown 2020 (Angelika-Reeves Marybel #[id number]), active fixation semi perm pacing lead implanted via right subclavian intraoperatively.  Post TAVR pt remains in AF with apparent CHB and regular escape of 30s-40s bpm at rest.  Beta blockers needed for CAD currently on hold secondary to conduction disease. Plan for Micra leadless pacemaker. Procedure deferred yesterday due to rectal temperature of 101.4. ID consult appreciated.     1. Fever of unknown origin   - ID consult appreciated  - blood cultures pending, afebrile (last temp  @ 1pm), WBC wnl  - empiric ABX started meropenem & vancomycin   - plan for Micra implant pending above, keep npo after midnight for possible implant 20  - will resume betablockers s/p ppm     2. AF with slow RVR vs CHB  - await ppm implant as above  - plan for MICRA AV, previously paroxysmal atrial fibrillation (last documented sinus rhythm 2020) via LFV (recent TAVR via RFA)  - pt may benefit from long term anticoagulation, will discuss with CTSx    MANAN Fournier Micra pacemaker implant canceled yesterday due to fever.   Afebrile this am, last temp 101.2,  @ 1pm. ID consulted.  Pt seen sitting in chair in CTICU, no complaints, no overnight events.    TELE: ventricular paced 60bpm     MEDICATIONS  (STANDING):  aspirin enteric coated 81 milliGRAM(s) Oral daily  atorvastatin 80 milliGRAM(s) Oral at bedtime  clopidogrel Tablet 75 milliGRAM(s) Oral daily  finasteride 5 milliGRAM(s) Oral daily  lactobacillus acidophilus 1 Tablet(s) Oral two times a day  meropenem  IVPB 1000 milliGRAM(s) IV Intermittent every 8 hours  sodium chloride 0.9% lock flush 3 milliLiter(s) IV Push every 8 hours  tamsulosin 0.4 milliGRAM(s) Oral at bedtime  vancomycin  IVPB 500 milliGRAM(s) IV Intermittent every 12 hours    Vital Signs Last 24 Hrs  T(C): 37.1 (2020 07:00), Max: 38.4 (2020 13:00)  T(F): 98.8 (2020 07:00), Max: 101.2 (2020 13:00)  HR: 60 (2020 08:00) (59 - 60)  BP: 108/57 (2020 08:00) (84/46 - 139/61)  BP(mean): 76 (2020 08:00) (60 - 90)  RR: 19 (2020 08:00) (15 - 26)  SpO2: 98% (2020 08:00) (95% - 99%)    Physical Exam:  Constitutional: NAD, AAOx3  Cardiovascular: +S1S2 RRR  RACW: +active fix lead connected to semi-perm box, dried heme on dressing, no bleeding, erythema, streaking or discharge   Pulmonary: decreased at bases     LABS:                        12.9   8.49  )-----------( 108      ( 2020 03:51 )             40.3         136  |  99  |  31.0<H>  ----------------------------<  118<H>  4.2   |  26.0  |  0.94    Ca    8.8      2020 03:51  Mg     2.0     07-28      PT/INR - ( 2020 02:52 )   PT: 13.2 sec;   INR: 1.15 ratio    PTT - ( 2020 02:52 )  PTT:32.6 sec    Urinalysis Basic - ( 2020 13:22 )    Color: Venita / Appearance: Clear / S.015 / pH: x  Gluc: x / Ketone: Negative  / Bili: Negative / Urobili: 1 mg/dL   Blood: x / Protein: 100 mg/dL / Nitrite: Negative   Leuk Esterase: Trace / RBC: 25-50 /HPF / WBC 6-10   Sq Epi: x / Non Sq Epi: Occasional / Bacteria: Few    blood cultures: pending     RADIOLOGY & ADDITIONAL TESTS:  CXR 20: The lungs are clear of airspace consolidations or effusions. No pneumothorax.  RIGHT subclavian pacemaker wire lead within RIGHT ventricle.  The heart and mediastinum are within normal limits.  Status post cardiac valve replacement.  Visualized osseous structures are intact.  IMPRESSION:   No evidence of active chest disease.    TTE 20:    1. Normal global left ventricular systolic function.   2. Left ventricular ejection fraction, by visual estimation, is 70 to 75%.   3. The mitral in-flow pattern reveals no discernable A-wave, therefore no comment on diastolic function can be made.   4. Severely enlarged left atrium.   5. Mild mitral annular calcification.   6. Mildly dilated RV size and normal function, pacing wire noted.   7. Thickening of the anterior and posterior mitral valve leaflets with mild regurgitation.   8. TAVR (Marybel valve) in the aortic position. There is no clear evidence of paravalvular leak. Peak velocity of 3.0 m/s mildly elevated, mean gradient of 14 mmHg, Dimensionless index of 0.45, which suggests normal functio lindsay prosthesis.   9. There is no evidence of pericardial effusion.  10. Compared to prior study from 20, intervalplacement of a TAVR with preserved LV systolic function/EF.  Christiano Jefferson DO Electronically signed on 2020 at 1:59:31 PM    < from: Cardiac Cath Lab - Adult (20 @ 08:25) >  CORONARY VESSELS: The coronary circulation is co-dominant.  LM:  --  LM: Angiography showed minor luminal irregularities with no flow  limiting lesions.  LAD:   --  Mid LAD: There was a tubular 95 % stenosis. The lesion was  eccentric and moderately calcified.  CX:   --  Proximal circumflex: There was a tubular 90 % stenosis. The  lesion was eccentric and moderately calcified.  --  Mid circumflex: There was a diffuse 50 % stenosis. The lesion was  irregularly contoured and heavily calcified.  RCA:   --  Ostial RCA: There was a 100 % stenosis. This lesion is a chronic  total occlusion.  COMPLICATIONS: There were no complications.  DIAGNOSTIC IMPRESSIONS: Moderate Pulmonary Hypertension. 2. Normal LV  function with LVEF of 65% on TTE from 2019. 3. Severe Aortic Stenosis  by TTE with a mean PG of 40mm Hgand PAOLO of 07cm2. 4. Triple vessel CAD.  5. Successful PCI to Proximal to mid LAD with ZESsx3.  DIAGNOSTIC RECOMMENDATIONS: Plavix for at least 6 months. 2. Aspirin 81mg  for 2 weeks, then to resume after Plavix is discontinued. 3. May resume  Eliquis tonite or in AM. 4. PPI or [id number] for GI protection. 5. CT  surgical evaluation for ZACARIAS. 6. Staged PCI to LCX after the ZACARIAS.  INTERVENTIONAL IMPRESSIONS: Moderate Pulmonary Hypertension. 2. Normal LV  function with LVEF of 65% on TTE from 2019. 3. Severe Aortic Stenosis  by TTE with a mean PG of 40mm Hg and PAOLO of 07cm2. 4. Triple vessel CAD.  5. Successful PCI to Proximal to mid LAD with ZESsx3.  INTERVENTIONAL RECOMMENDATIONS: Plavix for at least 6 months. 2. Aspirin  81mg for 2 weeks, then to resume after Plavix is discontinued. 3. May  resume Eliquis tonite or in AM. 4. PPI or [id number] for GI protection. 5.  CT surgical evaluation for ZACARIAS. 6. Staged PCI to LCX after the ZACARIAS.      A/P: 88 year old male h/o HTN, HLD, CAD s/p PCI to the LAD 2020, with residual 90% LCx awaiting staged intervention and 100% ostial RCA , previously paroxysmal atrial fibrillation (last documented sinus rhythm 2020), baseline 1st deg AV block and RBBB, and severe aortic stenosis now s/p elective TAVR via right femoral artery cutdown 2020 (Angelika-Reeves Marybel #[id number]), active fixation semi perm pacing lead implanted via right subclavian intraoperatively.  Post TAVR pt remains in AF with apparent CHB and regular escape of 30s-40s bpm at rest.  Beta blockers needed for CAD currently on hold secondary to conduction disease. Plan for Micra leadless pacemaker. Procedure deferred yesterday due to rectal temperature of 101.4. ID consult appreciated.     1. Fever of unknown origin   - ID consult appreciated  - blood cultures pending, afebrile (last temp  @ 1pm), WBC wnl  - empiric ABX started meropenem & vancomycin   - plan for Micra implant pending above, keep npo after midnight for possible implant 20  - will resume betablockers s/p ppm     2. AF with slow RVR vs CHB  - await ppm implant as above  - plan for MICRA AV, previously paroxysmal atrial fibrillation (last documented sinus rhythm 2020) via LFV (recent TAVR via RFA)  - pt may benefit from long term anticoagulation, will discuss with CTSx. Was on eliquis 2.5mg po bid on admission (87yo and 52kg)     MANAN Fournier

## 2020-07-28 NOTE — PROGRESS NOTE ADULT - SUBJECTIVE AND OBJECTIVE BOX
SUBJECTIVE   "i am hoping we can get this done today"   without acute complaints     INTERIM HISTORY SIGNIFICANT FOR   patient s.p tavr with post op heart block now in slow afib (30s) - scheduled for PPM yesterday complicated by rectal temp > 101 since worked up started on cheyanne and vanco   currently NPO for possible PPM in AM       Patient is a 88y old  Male who presents with a chief complaint of s/p TAVR (27 Jul 2020 00:49)    HPI:  88 year old male alert and oriented to time, place, situation presents accompanied by his daughter who offers additional information.    PMH significant for MI, HTN, A-fib, CAD, HLD and severe aortic stenosis. He is planning to undergo elective TAVR on July 24, 2020 with Dr. Carney.    Patient reports dyspnea on moderate exertion and fatigue which has become significantly worse over the past three weeks.    Pre operative cardiac cath. revealed obstructive CAD and is now s/p PCI to the LAD (June 2020). The patient has residual disease in the left circumflex and is planning to have a staged intervention once the TAVR is complete.    He denies any angina, palpitations, dizziness, syncope, shortness of breath at rest, orthopnea and PND.      His daughter reports that he does snore and at times has cessation of breathing during sleep however the patient has never been tested for NEREIDA. (15 Jul 2020 12:03)    OBJECTIVE  PAST MEDICAL & SURGICAL HISTORY:  Coronary artery disease: status post cardiac stents June 2020  Sinus bradycardia  Former tobacco use: quit 1984  Hyperlipidemia, mild  HTN (hypertension)  Moderate mitral regurgitation  AF (atrial fibrillation)  Severe aortic stenosis  S/P cataract surgery  H/O hernia repair    Magic Cup and ice cream TID RD OK (Unknown)  No Known Allergies    Home Medications:  aspirin 81 mg oral delayed release tablet: 1 tab(s) orally once a day (24 Jul 2020 06:04)  Eliquis 2.5 mg oral tablet: 1 tab(s) orally 2 times a day (24 Jul 2020 06:04)  finasteride 5 mg oral tablet: 1 tab(s) orally once a day (24 Jul 2020 06:04)  rosuvastatin 40 mg oral tablet: 1 tab(s) orally once a day (24 Jul 2020 06:04)  tamsulosin 0.4 mg oral capsule: 1 cap(s) orally once a day (24 Jul 2020 06:04)  valsartan 320 mg oral tablet: 1 tab(s) orally once a day (24 Jul 2020 06:04)  Zetia 10 mg oral tablet: 1 tab(s) orally once a day (24 Jul 2020 06:04)    VITALS  Currently in sinus rhythm with vitals as below  ICU Vital Signs Last 24 Hrs  T(C): 37.5 (28 Jul 2020 01:00), Max: 38.4 (27 Jul 2020 13:00)  T(F): 99.5 (28 Jul 2020 01:00), Max: 101.2 (27 Jul 2020 13:00)  HR: 60 (28 Jul 2020 01:00) (59 - 60)  BP: 139/61 (28 Jul 2020 01:00) (84/46 - 159/72)  BP(mean): 88 (28 Jul 2020 01:00) (60 - 103)  RR: 18 (28 Jul 2020 01:00) (12 - 39)  SpO2: 98% (28 Jul 2020 01:00) (91% - 98%)      LABS                        12.4   8.46  )-----------( 94       ( 27 Jul 2020 02:52 )             37.7   PT/INR - ( 27 Jul 2020 02:52 )   PT: 13.2 sec;   INR: 1.15 ratio         PTT - ( 27 Jul 2020 02:52 )  PTT:32.6 [id number]    132<L>  |  96<L>  |  27.0<H>  ----------------------------<  128<H>  4.1   |  25.0  |  0.85    Ca    8.6      27 Jul 2020 02:52  Mg     2.0     07-27    CAPILLARY BLOOD GLUCOSE              IN/OUT    07-26-20 @ 07:01  -  07-27-20 @ 07:00  --------------------------------------------------------  IN: 970 mL / OUT: 1095 mL / NET: -125 mL    07-27-20 @ 07:01  -  07-28-20 @ 01:40  --------------------------------------------------------  IN: 930 mL / OUT: 640 mL / NET: 290 mL      IMAGING  personally reviewed imaging     CURRENT MEDICATIONS  MEDICATIONS  (STANDING):  albumin human  5% IVPB 250 milliLiter(s) IV Intermittent once  aspirin enteric coated 81 milliGRAM(s) Oral daily  atorvastatin 80 milliGRAM(s) Oral at bedtime  clopidogrel Tablet 75 milliGRAM(s) Oral daily  finasteride 5 milliGRAM(s) Oral daily  lactobacillus acidophilus 1 Tablet(s) Oral two times a day  meropenem  IVPB 1000 milliGRAM(s) IV Intermittent every 8 hours  sodium chloride 0.9% lock flush 3 milliLiter(s) IV Push every 8 hours  tamsulosin 0.4 milliGRAM(s) Oral at bedtime  vancomycin  IVPB 500 milliGRAM(s) IV Intermittent every 12 hours    MEDICATIONS  (PRN):  acetaminophen   Tablet .. 650 milliGRAM(s) Oral every 6 hours PRN Temp greater or equal to 38.5C (101.3F)

## 2020-07-29 LAB
ANION GAP SERPL CALC-SCNC: 8 MMOL/L — SIGNIFICANT CHANGE UP (ref 5–17)
BUN SERPL-MCNC: 38 MG/DL — HIGH (ref 8–20)
CALCIUM SERPL-MCNC: 8.2 MG/DL — LOW (ref 8.6–10.2)
CHLORIDE SERPL-SCNC: 100 MMOL/L — SIGNIFICANT CHANGE UP (ref 98–107)
CO2 SERPL-SCNC: 27 MMOL/L — SIGNIFICANT CHANGE UP (ref 22–29)
CREAT SERPL-MCNC: 0.93 MG/DL — SIGNIFICANT CHANGE UP (ref 0.5–1.3)
GLUCOSE SERPL-MCNC: 116 MG/DL — HIGH (ref 70–99)
HCT VFR BLD CALC: 36.7 % — LOW (ref 39–50)
HGB BLD-MCNC: 11.7 G/DL — LOW (ref 13–17)
MAGNESIUM SERPL-MCNC: 1.9 MG/DL — SIGNIFICANT CHANGE UP (ref 1.6–2.6)
MCHC RBC-ENTMCNC: 29.8 PG — SIGNIFICANT CHANGE UP (ref 27–34)
MCHC RBC-ENTMCNC: 31.9 GM/DL — LOW (ref 32–36)
MCV RBC AUTO: 93.6 FL — SIGNIFICANT CHANGE UP (ref 80–100)
PLATELET # BLD AUTO: 105 K/UL — LOW (ref 150–400)
POTASSIUM SERPL-MCNC: 4.3 MMOL/L — SIGNIFICANT CHANGE UP (ref 3.5–5.3)
POTASSIUM SERPL-SCNC: 4.3 MMOL/L — SIGNIFICANT CHANGE UP (ref 3.5–5.3)
RBC # BLD: 3.92 M/UL — LOW (ref 4.2–5.8)
RBC # FLD: 15.4 % — HIGH (ref 10.3–14.5)
SODIUM SERPL-SCNC: 135 MMOL/L — SIGNIFICANT CHANGE UP (ref 135–145)
VANCOMYCIN TROUGH SERPL-MCNC: 8.7 UG/ML — LOW (ref 10–20)
WBC # BLD: 7.37 K/UL — SIGNIFICANT CHANGE UP (ref 3.8–10.5)
WBC # FLD AUTO: 7.37 K/UL — SIGNIFICANT CHANGE UP (ref 3.8–10.5)

## 2020-07-29 PROCEDURE — 93010 ELECTROCARDIOGRAM REPORT: CPT

## 2020-07-29 PROCEDURE — 33274 TCAT INSJ/RPL PERM LDLS PM: CPT | Mod: Q0

## 2020-07-29 PROCEDURE — 99233 SBSQ HOSP IP/OBS HIGH 50: CPT | Mod: 25

## 2020-07-29 PROCEDURE — 71045 X-RAY EXAM CHEST 1 VIEW: CPT | Mod: 26

## 2020-07-29 PROCEDURE — 99232 SBSQ HOSP IP/OBS MODERATE 35: CPT

## 2020-07-29 RX ORDER — CEFAZOLIN SODIUM 1 G
2000 VIAL (EA) INJECTION EVERY 8 HOURS
Refills: 0 | Status: COMPLETED | OUTPATIENT
Start: 2020-07-29 | End: 2020-07-30

## 2020-07-29 RX ADMIN — ATORVASTATIN CALCIUM 80 MILLIGRAM(S): 80 TABLET, FILM COATED ORAL at 21:26

## 2020-07-29 RX ADMIN — TAMSULOSIN HYDROCHLORIDE 0.4 MILLIGRAM(S): 0.4 CAPSULE ORAL at 21:26

## 2020-07-29 RX ADMIN — Medication 1 TABLET(S): at 05:44

## 2020-07-29 RX ADMIN — Medication 100 MILLIGRAM(S): at 21:35

## 2020-07-29 RX ADMIN — SODIUM CHLORIDE 3 MILLILITER(S): 9 INJECTION INTRAMUSCULAR; INTRAVENOUS; SUBCUTANEOUS at 05:39

## 2020-07-29 RX ADMIN — Medication 100 MILLIGRAM(S): at 05:44

## 2020-07-29 RX ADMIN — SODIUM CHLORIDE 3 MILLILITER(S): 9 INJECTION INTRAMUSCULAR; INTRAVENOUS; SUBCUTANEOUS at 21:26

## 2020-07-29 RX ADMIN — SODIUM CHLORIDE 3 MILLILITER(S): 9 INJECTION INTRAMUSCULAR; INTRAVENOUS; SUBCUTANEOUS at 14:09

## 2020-07-29 RX ADMIN — MEROPENEM 100 MILLIGRAM(S): 1 INJECTION INTRAVENOUS at 05:39

## 2020-07-29 NOTE — PROGRESS NOTE ADULT - RS GEN PE MLT RESP DETAILS PC
good air movement/respirations non-labored/breath sounds equal
good air movement/airway patent/breath sounds equal

## 2020-07-29 NOTE — PROGRESS NOTE ADULT - ASSESSMENT
88 year old male alert and oriented to time, place, situation presents accompanied by his daughter who offers additional information.  PMH significant for MI, HTN, A-fib, CAD, HLD and severe aortic stenosis. He is planning to undergo elective TAVR on July 24, 2020 with Dr. Carney.  Patient reports dyspnea on moderate exertion and fatigue which has become significantly worse over the past three weeks.  Pre operative cardiac cath. revealed obstructive CAD and is now s/p PCI to the LAD (June 2020). The patient has residual disease in the left circumflex and is planning to have a staged intervention once the TAVR is complete.  He denies any angina, palpitations, dizziness, syncope, shortness of breath at rest, orthopnea and PND.    His daughter reports that he does snore and at times has cessation of breathing during sleep however the patient has never been tested for NEREIDA. (15 Jul 2020 12:03)    He was admitted 7/24/20, and is s/p Transfemoral TAVR via Right Common Femoral Artery Cutdown. NCT# [id number], STS/[facility] TVT Registry Patient ID# [id number].  Post operatively. he was noted with bradycardia in the 50's. EP consulted and pt recommended for PPM, planned for today 7/27- underlying rhythm checked - slow afib vs bradycardia in the 30s; will cont to hold beta-blockers,     Further noted to have urinary retention post op, requiring Gonzalez placement.    7/27 AM, patient was found with a fever of 101.2.  he has no real complaints, except for some chills.   Denies cough.       Impression:  Fevers  s/p TAVR   Temporary pacemaker in place  bradycardia, pending permanent pacemaker  urinary retention requiring gonzalez     Plan:  - FEVERS resolved    RIGHT femoral site does not appear infected  RIGHT IJ catheter site is stable    - urine culture is negative  - Blood cultures with no growth to date; and likely to remain negative at 48h hernan    at this point, will stop antibiotics    CAN PROCEED WITH PLACEMENT OF PERMANENT PACEMAKER from ID perspective.  Pre-procedural antibiotics as per Electrophysiology team.

## 2020-07-29 NOTE — PROGRESS NOTE ADULT - COMMENTS
without acute complaints
without acute complaints
admits groin pain right > left in incision from TAVR

## 2020-07-29 NOTE — PROGRESS NOTE ADULT - SUBJECTIVE AND OBJECTIVE BOX
[facility]  INFECTIOUS DISEASES AND INTERNAL MEDICINE at [place]  =======================================================  Tin Monahan MD  Diplomates American Board of Internal Medicine and Infectious Diseases  Tel  [phone]  Fax [phone]  =======================================================    [facility]-[id number]  CLARISSE GUTIERRES   follow up:  fevers    fevers resolved  urine cultures are negative  Blood cultures are negative so far; and 48H would be at 1400 today.         =======================================================  REVIEW OF SYSTEMS:  CONSTITUTIONAL:   no fevers  HEENT:  No diplopia or blurred vision.  No earache, sore throat or runny nose.  CARDIOVASCULAR:  No pressure, squeezing, strangling, tightness, heaviness or aching about the chest, neck, axilla or epigastrium.  RESPIRATORY:  No cough, shortness of breath  GASTROINTESTINAL:  No nausea, vomiting or diarrhea.  GENITOURINARY:  No dysuria, frequency or urgency. No Blood in urine  MUSCULOSKELETAL:  no joint aches, no muscle pain  SKIN:  No change in skin, hair or nails.  NEUROLOGIC:  No Headaches, seizures or weakness.  PSYCHIATRIC:  No disorder of thought or mood.  ENDOCRINE:  No heat or cold intolerance  HEMATOLOGICAL:  No easy bruising or bleeding.   =======================================================  Allergies  No Known Allergies    ======================================================  Physical Exam:  ============  (see vitals section below)    General:  No acute distress.  THIN  Eye: Pupils are equal, round and reactive to light, Extraocular movements are intact, Normal conjunctiva.  HENT: Normocephalic, Oral mucosa is moist, No pharyngeal erythema, No sinus tenderness.  Neck: Supple, No lymphadenopathy.  Respiratory: Lungs with fair air entry  Cardiovascular: Normal rate, Regular rhythm,   RIGHT IJ with Pacing wire   Gastrointestinal: Soft, Non-tender, Non-distended, Normal bowel sounds.  Genitourinary: No costovertebral angle tenderness.  + PEDERSON in place  Lymphatics: No lymphadenopathy neck,   Musculoskeletal: Normal range of motion, Normal strength.  Integumentary: No rash.  RIGHT FEMORAL INCISION SITE  with dressing in place  Neurologic: Alert, Oriented, No focal deficits, Cranial Nerves II-XII are grossly intact.  Psychiatric: Appropriate mood & affect.    =======================================================     Vitals:  ============  T(F): 98.2 (29 Jul 2020 09:00), Max: 100.2 (28 Jul 2020 11:00)  HR: 60 (29 Jul 2020 10:00)  BP: 120/56 (29 Jul 2020 10:00)  RR: 20 (29 Jul 2020 10:00)  SpO2: 98% (29 Jul 2020 10:00) (96% - 99%)  temp max in last 48H T(F): , Max: 101.2 (07-27-20 @ 13:00)    =======================================================  Current Antibiotics:  meropenem  IVPB 1000 milliGRAM(s) IV Intermittent every 8 hours    Other medications:  aspirin enteric coated 81 milliGRAM(s) Oral daily  atorvastatin 80 milliGRAM(s) Oral at bedtime  clopidogrel Tablet 75 milliGRAM(s) Oral daily  finasteride 5 milliGRAM(s) Oral daily  lactobacillus acidophilus 1 Tablet(s) Oral two times a day  sodium chloride 0.9% lock flush 3 milliLiter(s) IV Push every 8 hours  tamsulosin 0.4 milliGRAM(s) Oral at bedtime      Antibiotics Course:  cefuroxime  IVPB   100 mL/Hr (07-25-20 @ 07:51)   100 mL/Hr (07-25-20 @ 00:09)   100 mL/Hr (07-24-20 @ 16:15)    meropenem  IVPB   100 mL/Hr (07-29-20 @ 05:39)   100 mL/Hr (07-28-20 @ 21:20)   100 mL/Hr (07-28-20 @ 13:33)   100 mL/Hr (07-28-20 @ 07:00)   100 mL/Hr (07-27-20 @ 21:17)   100 mL/Hr (07-27-20 @ 14:41)    vancomycin  IVPB   100 mL/Hr (07-29-20 @ 05:44)   100 mL/Hr (07-28-20 @ 17:24)   100 mL/Hr (07-28-20 @ 05:40)   100 mL/Hr (07-27-20 @ 17:20)        =======================================================  Labs:                        11.7   7.37  )-----------( 105      ( 29 Jul 2020 02:37 )             36.7      07-29    135  |  100  |  38.0<H>  ----------------------------<  116<H>  4.3   |  27.0  |  0.93    Ca    8.2<L>      29 Jul 2020 02:37  Mg     1.9     07-29    WBC Count: 7.37 K/uL (07-29-20 @ 02:37)  WBC Count: 8.49 K/uL (07-28-20 @ 03:51)  WBC Count: 8.46 K/uL (07-27-20 @ 02:52)  WBC Count: 13.53 K/uL (07-25-20 @ 02:30)      Culture - Urine (collected 07-27-20 @ 21:56)  Source: .Urine Catheterized  Final Report (07-28-20 @ 21:50):    No growth    Culture - Urine (collected 07-15-20 @ 21:34)  Source: .Urine Clean Catch (Midstream)  Final Report (07-16-20 @ 17:10):    <10,000 CFU/mL Normal Urogenital Chata      Creatinine, Serum: 0.93 mg/dL (07-29-20 @ 02:37)  Creatinine, Serum: 0.94 mg/dL (07-28-20 @ 03:51)  Creatinine, Serum: 0.85 mg/dL (07-27-20 @ 02:52)  Creatinine, Serum: 0.93 mg/dL (07-26-20 @ 07:04)  Creatinine, Serum: 1.01 mg/dL (07-25-20 @ 08:41)  Creatinine, Serum: 1.10 mg/dL (07-25-20 @ 02:30)    Procalcitonin, Serum: 0.27 ng/mL (07-27-20 @ 13:22)       WBC Count: 7.37 K/uL (07-29-20 @ 02:37)  WBC Count: 8.49 K/uL (07-28-20 @ 03:51)  WBC Count: 8.46 K/uL (07-27-20 @ 02:52)  WBC Count: 13.53 K/uL (07-25-20 @ 02:30)

## 2020-07-29 NOTE — PROGRESS NOTE ADULT - SUBJECTIVE AND OBJECTIVE BOX
PROCEDURE(S):   Medtronic  Micra AV leadless PPM Implant    ELECTRPHYSIOLOGIST(S):   Dr. MINERVA Yip MD    COMPLICATIONS:   none          DISPOSITION:  pt to return to CTICU           CONDITION:     Stable      Pt doing well s/p Medtronic Micra AV leadless PPM Implant. Denies complaint, resting comfortably.     Exam:   T(C): 37.2 (07-29-20 @ 12:00), Max: 37.7 (07-28-20 @ 20:00)  HR: 60 (07-29-20 @ 12:00) (59 - 60)  BP: 142/64 (07-29-20 @ 12:00) (93/51 - 142/64)  RR: 24 (07-29-20 @ 12:00) (16 - 31)  SpO2: 97% (07-29-20 @ 12:00) (97% - 99%)  Wt(kg): --      VSS, NAD, A&O x 3  Incision: Left Groin with hemostatic suture in place, Dressing C/D/I; no bleeding, hematoma, erythema or edema  Card: S1/S2, RRR, no m/g/r  Resp: lungs CTA b/l  Abd: S/NT/ND  Ext: no edema, distal pulses intact    EKG:        DEVICE:  Medtronic Micra  [id number]  Mode:  VVI 60  R Waves; 7.5mV  Impedance: 870 ms  Threshold: 0.38V @ 0.24 ms       ASSESSMENT / PLAN:       88y Male h/o HTN, HLD, CAD s/p PCI to the LAD June 2020, with residual 90% LCx awaiting staged intervention and 100% ostial RCA , previously paroxysmal atrial fibrillation (last documented sinus rhythm June 5, 2020), 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  Post TAVR pt remains in AF with apparent CHB and regular escape of 30s-40s bpm at rest.  Beta blockers needed for CAD currently on hold secondary to conduction disease.  Now status post uncomplicated Medtronic Micra AV leadless PPM Implant        -Port CXR done, no jennifer effusion, device location grossly stable and appropriate, semi perm pacing wire remains in place   -Bedrest x 6 hours post implant, progress per CTS team   -Continued observation on telemetry overnight   -Cont Ancef 2gm IV q 8 hours x 2 additional doses to complete 24 hour course.   -Pain control with PO analgesia PRN   -NO HEPARIN OR LOVENOX, INCLUDING PROPHYLACTIC/SUBCUT DOSING, UNTIL OTHERWISE ADVISED BY EP.   -PA/Lat CXR and device check in AM  -groin suture and semi permanent lead to be removed by EP service in am   -outpatient f/u in 2-3 weeks  -further dispo per primary team

## 2020-07-29 NOTE — PROGRESS NOTE ADULT - SUBJECTIVE AND OBJECTIVE BOX
Department of Cardiology                                                     [facility]/[place] [address] [address]                                               Telephone: [phone]. Fax:[phone]                                                             Pre-Procedure  NP Note      Narrative:      88 year old male h/o HTN, HLD, CAD s/p PCI to the LAD June 2020, with residual 90% LCx awaiting staged intervention and 100% ostial RCA , previously paroxysmal atrial fibrillation (last documented sinus rhythm June 5, 2020), 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  Post TAVR pt remains in AF with apparent CHB and regular escape of 30s-40s bpm at rest.  Beta blockers needed for CAD currently on hold secondary to conduction disease. Plan for Micra leadless pacemaker today.     	  MEDICATIONS:  tamsulosin 0.4 milliGRAM(s) Oral at bedtime  acetaminophen   Tablet .. 650 milliGRAM(s) Oral every 6 hours PRN  atorvastatin 80 milliGRAM(s) Oral at bedtime  finasteride 5 milliGRAM(s) Oral daily  aspirin enteric coated 81 milliGRAM(s) Oral daily  clopidogrel Tablet 75 milliGRAM(s) Oral daily  hemorrhoidal Ointment 1 Application(s) Rectal three times a day PRN  sodium chloride 0.9% lock flush 3 milliLiter(s) IV Push every 8 hours      PHYSICAL EXAM:    T(C): 37.2 (07-29-20 @ 12:00), Max: 37.7 (07-28-20 @ 20:00)  HR: 60 (07-29-20 @ 12:00) (59 - 60)  BP: 142/64 (07-29-20 @ 12:00) (93/51 - 142/64)  RR: 24 (07-29-20 @ 12:00) (16 - 31)  SpO2: 97% (07-29-20 @ 12:00) (96% - 99%)  Wt(kg): --    I&O's Summary    28 Jul 2020 07:01  -  29 Jul 2020 07:00  --------------------------------------------------------  IN: 1190 mL / OUT: 1070 mL / NET: 120 mL    29 Jul 2020 07:01  -  29 Jul 2020 12:45  --------------------------------------------------------  IN: 0 mL / OUT: 320 mL / NET: -320 mL      Constitutional: A & O x 3  HEENT:   Normal oral mucosa, PERRL, EOMI	  Cardiovascular: Normal S1 S2, No JVD, No murmurs, No edema  Respiratory: Lungs clear to auscultation	  Gastrointestinal:  Soft, Non-tender, + BS	  Skin: No rashes, No ecchymoses, No cyanosis  Neurologic: Non-focal  Extremities: Normal range of motion, No clubbing, cyanosis or edema  Vascular: Peripheral pulses palpable 2+ bilaterally    TELEMETRY: 	    ECG:  	    < from: 12 Lead ECG (07.25.20 @ 05:12) >   Line Ventricular-paced rhythm    Confirmed by Jose Bowden ([id number]) on 7/25/2020 9:28:57 AM    < end of copied text >    RADIOLOGY:   DIAGNOSTIC TESTING:  [X ] Echocardiogram:    < from: TTE Echo Complete w/ Contrast w/ Doppler (07.24.20 @ 08:19) >  Summary:   1. Normal global left ventricular systolic function.   2. Left ventricular ejection fraction, by visual estimation, is 70 to 75%.   3. The mitral in-flow pattern reveals no discernable A-wave, therefore no comment on diastolic function can be made.   4. Severely enlarged left atrium.   5. Mild mitral annular calcification.   6. Mildly dilated RV size and normal function, pacing wire noted.   7. Thickening of the anterior and posterior mitral valve leaflets with mild regurgitation.   8. TAVR (Marybel valve) in the aortic position. There is no clear evidence of paravalvular leak. Peak velocity of 3.0 m/s mildly elevated, mean gradient of 14 mmHg, Dimensionless index of 0.45, which suggests normal functio lindsay prosthesis.   9. There is no evidence of pericardial effusion.  10. Compared to prior study from 6/18/20, intervalplacement of a TAVR with preserved LV systolic function/EF.    Monae Jefferson DO Electronically signed on 7/24/2020 at 1:59:31 PM     *** Final ***    MONAE GUADARRAMA   This document has been electronically signed. Jul 24 2020  8:19AM      < end of copied text >      [ X]  Catheterization:    < from: Cardiac Cath Lab - Adult (06.05.20 @ 08:25) >  VENTRICLES: Global left ventricular function was normal. EF by echo was 65  %.  CORONARY VESSELS: The coronary circulation is co-dominant.  LM:  --  LM: Angiography showed minor luminal irregularities with no flow  limiting lesions.  LAD:   --  Mid LAD: There was a tubular 95 % stenosis. The lesion was  eccentric and moderately calcified.  CX:   --  Proximal circumflex: There was a tubular 90 % stenosis. The  lesion was eccentric and moderately calcified.  --  Mid circumflex: There was a diffuse 50 % stenosis. The lesion was  irregularly contoured and heavily calcified.  RCA:   --  Ostial RCA: There was a 100 % stenosis. This lesion is a chronic  total occlusion.  COMPLICATIONS: There were no complications.  DIAGNOSTIC IMPRESSIONS: Moderate Pulmonary Hypertension. 2. Normal LV  function with LVEF of 65% on TTE from 11/2019. 3. Severe Aortic Stenosis  by TTE with a mean PG of 40mm Hgand PAOLO of 07cm2. 4. Triple vessel CAD.  5. Successful PCI to Proximal to mid LAD with ZESsx3.  DIAGNOSTIC RECOMMENDATIONS: Plavix for at least 6 months. 2. Aspirin 81mg  for 2 weeks, then to resume after Plavix is discontinued. 3. May resume  Eliquis tonite or in AM. 4. PPI or [id number] for GI protection. 5. CT  surgical evaluation for ZACARIAS. 6. Staged PCI to LCX after the ZACARIAS.  INTERVENTIONAL IMPRESSIONS: Moderate Pulmonary Hypertension. 2. Normal LV  function with LVEF of 65% on TTE from 11/2019. 3. Severe Aortic Stenosis  by TTE with a mean PG of 40mm Hg and PAOLO of 07cm2. 4. Triple vessel CAD.  5. Successful PCI to Proximal to mid LAD with ZESsx3.  INTERVENTIONAL RECOMMENDATIONS: Plavix for at least 6 months. 2. Aspirin  81mg for 2 weeks, then to resume after Plavix is discontinued. 3. May  resume Eliquis tonite or in AM. 4. PPI or [id number] for GI protection. 5.  CT surgical evaluation for ZACARIAS. 6. Staged PCI to LCX after the ZACARIAS.  Prepared and signed by  Clay Cary M.D.  Signed 06/05/2020 11:03:54    < end of copied text >      < from: Cardiac Cath Lab - Adult (07.24.20 @ 07:24) >  PROCEDURE:  --  Aortography.  --  Cardiac Fluoro.  --  Intubation-Non-cath Physician.  --  Sonosite.  --  TemporaryPacemaker-Int.  --  Transcatheter Aortic Valve Replacement.  --  Hemostasis with Angioseal-Intervention.  Local anesthetic given. Right femoral artery access. Left femoral artery  access. Right subclavian vein access. Aortography. A catheter was placed  and contrast was injected. Cardiac Fluoro. Intubation-Non-cath Physician.  Sonosite. Under general anesthesia, a semi-perm pacemaker was implanted  via the R SC vein by Dr. Carney. A 6Fr LFA sheath was inserted using a  modified Seldinger technique. A R Femoral cut-down was performed by dr. Carney. After accessing the RFA with an arterial needle, a stiff wire  was used to advance a 14Fr Reeves sheath under flouroscopic guidance.  After crossing the Aortic valve with a uniglide straightwire, a Confida  wire was advanced into the LV over a 5Fr AL1 catheter. A mm ES3 Ultra  valve was implanted at nominal pressure and placement was confirmed with a  Trans-thoracic Echocardiogram and less than mild PVL was noted and no  pericardial effusion was observed. A Descending Aortogram after the R FA  cut-down repair did not reveal any significant vascular complications and  suitability for a vascular closure device was confirmed for the L CFA. A 6  Fr Angio-seal was deployed in the L CFA. Please refer to dr. Carney's  operative report for surgical details. Temporary Pacemaker-Int.  Transcatheter Aortic Valve Replacement. Hemostasis with  Angioseal-Intervention.  COMPLICATIONS: There were no complications.  DIAGNOSTIC IMPRESSIONS: Successful deployment of a 23mm Reeves-Marybel S3  Ultra via a Right Femoral cut-down approach. 2. Successful implantation of  a semi-perm PM via R SCV. 3. Suceesful deployment of a 6Fr Angio-Seal to L  CFA. 4. Please refer to dr. Carney's Operative Report for surgical  details.  DIAGNOSTIC RECOMMENDATIONS: Avoid negative chronotropy for 24-36 hrs. 2.  Plavix for at least 6 months. 3. Resumption of OAC per CT surgical team.  4. TTE for obtaining data for submission to TAVR registry.  INTERVENTIONAL IMPRESSIONS: Successful deployment of a 23mm Reeves-Marybel  S3 Ultra via a Right Femoral cut-down approach. 2. Successful implantation  of a semi-perm PM via R SCV. 3. Suceesful deployment of a 6Fr Angio-Seal  to L CFA. 4. Please refer to dr. Carney's Operative Report for surgical  details.  INTERVENTIONAL RECOMMENDATIONS: Avoid negative chronotropy for 24-36 hrs.  2. Plavix for at least 6 months. 3. Resumption of OAC per CT surgical  team. 4. TTE for obtaining data for submission to TAVR registry.  Prepared and signed by  Clay Cary M.D.  Signed 07/24/2020 09:35:15    < end of copied text >    [ ] Stress Test:    OTHER: 	    LABS:	 	    CARDIAC MARKERS:                        11.7   7.37  )-----------( 105      ( 29 Jul 2020 02:37 )             36.7     07-29    135  |  100  |  38.0<H>  ----------------------------<  116<H>  4.3   |  27.0  |  0.93    Ca    8.2<L>      29 Jul 2020 02:37  Mg     1.9     07-29        ASSESSMENT / PLAN:     88 year old male h/o HTN, HLD, CAD s/p PCI to the LAD June 2020, with residual 90% LCx awaiting staged intervention and 100% ostial RCA , previously paroxysmal atrial fibrillation (last documented sinus rhythm June 5, 2020), 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  Post TAVR pt remains in AF with apparent CHB and regular escape of 30s-40s bpm at rest.  Beta blockers needed for CAD currently on hold secondary to conduction disease. Plan for Micra leadless pacemaker today.     -2 units prbc on hold for procedure  -s/p PCI to LAD (6/2020), staged RCA  when pt stable  -PT in slow afib post TAVR; now planned for micra ppm 2/2 CHB and regular escape 30-40 bmp at rest  -avn blockers have been on hold, will plan to resume BB post ppm placement   -f/u CTS regarding AC when able Department of Cardiology                                                     [facility]/[place] [address] [place]                                               Telephone: [phone]. Fax:[phone]                                                             Pre-Procedure  NP Note      Narrative:      88 year old male h/o HTN, HLD, CAD s/p PCI to the LAD June 2020, with residual 90% LCx awaiting staged intervention and 100% ostial RCA , previously paroxysmal atrial fibrillation (last documented sinus rhythm June 5, 2020), 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  Post TAVR pt remains in AF with apparent CHB and regular escape of 30s-40s bpm at rest.  Beta blockers needed for CAD currently on hold secondary to conduction disease. Plan for Micra leadless pacemaker today.     	  MEDICATIONS:  tamsulosin 0.4 milliGRAM(s) Oral at bedtime  acetaminophen   Tablet .. 650 milliGRAM(s) Oral every 6 hours PRN  atorvastatin 80 milliGRAM(s) Oral at bedtime  finasteride 5 milliGRAM(s) Oral daily  aspirin enteric coated 81 milliGRAM(s) Oral daily  clopidogrel Tablet 75 milliGRAM(s) Oral daily  hemorrhoidal Ointment 1 Application(s) Rectal three times a day PRN  sodium chloride 0.9% lock flush 3 milliLiter(s) IV Push every 8 hours      PHYSICAL EXAM:    T(C): 37.2 (07-29-20 @ 12:00), Max: 37.7 (07-28-20 @ 20:00)  HR: 60 (07-29-20 @ 12:00) (59 - 60)  BP: 142/64 (07-29-20 @ 12:00) (93/51 - 142/64)  RR: 24 (07-29-20 @ 12:00) (16 - 31)  SpO2: 97% (07-29-20 @ 12:00) (96% - 99%)  Wt(kg): --    I&O's Summary    28 Jul 2020 07:01  -  29 Jul 2020 07:00  --------------------------------------------------------  IN: 1190 mL / OUT: 1070 mL / NET: 120 mL    29 Jul 2020 07:01  -  29 Jul 2020 12:45  --------------------------------------------------------  IN: 0 mL / OUT: 320 mL / NET: -320 mL      Constitutional: A & O x 3  HEENT:   Normal oral mucosa, PERRL, EOMI	  Cardiovascular: Normal S1 S2, No JVD, No murmurs, No edema  Respiratory: Lungs clear to auscultation	  Gastrointestinal:  Soft, Non-tender, + BS	  Skin: No rashes, No ecchymoses, No cyanosis  Neurologic: Non-focal  Extremities: Normal range of motion, No clubbing, cyanosis or edema  Vascular: Peripheral pulses palpable 2+ bilaterally    TELEMETRY: 	    ECG:  	    < from: 12 Lead ECG (07.25.20 @ 05:12) >   Line Ventricular-paced rhythm    Confirmed by Jose Bowden ([id number]) on 7/25/2020 9:28:57 AM    < end of copied text >    RADIOLOGY:   DIAGNOSTIC TESTING:  [X ] Echocardiogram:    < from: TTE Echo Complete w/ Contrast w/ Doppler (07.24.20 @ 08:19) >  Summary:   1. Normal global left ventricular systolic function.   2. Left ventricular ejection fraction, by visual estimation, is 70 to 75%.   3. The mitral in-flow pattern reveals no discernable A-wave, therefore no comment on diastolic function can be made.   4. Severely enlarged left atrium.   5. Mild mitral annular calcification.   6. Mildly dilated RV size and normal function, pacing wire noted.   7. Thickening of the anterior and posterior mitral valve leaflets with mild regurgitation.   8. TAVR (Marybel valve) in the aortic position. There is no clear evidence of paravalvular leak. Peak velocity of 3.0 m/s mildly elevated, mean gradient of 14 mmHg, Dimensionless index of 0.45, which suggests normal functio lindsay prosthesis.   9. There is no evidence of pericardial effusion.  10. Compared to prior study from 6/18/20, intervalplacement of a TAVR with preserved LV systolic function/EF.    Monae Jefferson DO Electronically signed on 7/24/2020 at 1:59:31 PM     *** Final ***    MONAE GUADARRAMA   This document has been electronically signed. Jul 24 2020  8:19AM      < end of copied text >      [ X]  Catheterization:    < from: Cardiac Cath Lab - Adult (06.05.20 @ 08:25) >  VENTRICLES: Global left ventricular function was normal. EF by echo was 65  %.  CORONARY VESSELS: The coronary circulation is co-dominant.  LM:  --  LM: Angiography showed minor luminal irregularities with no flow  limiting lesions.  LAD:   --  Mid LAD: There was a tubular 95 % stenosis. The lesion was  eccentric and moderately calcified.  CX:   --  Proximal circumflex: There was a tubular 90 % stenosis. The  lesion was eccentric and moderately calcified.  --  Mid circumflex: There was a diffuse 50 % stenosis. The lesion was  irregularly contoured and heavily calcified.  RCA:   --  Ostial RCA: There was a 100 % stenosis. This lesion is a chronic  total occlusion.  COMPLICATIONS: There were no complications.  DIAGNOSTIC IMPRESSIONS: Moderate Pulmonary Hypertension. 2. Normal LV  function with LVEF of 65% on TTE from 11/2019. 3. Severe Aortic Stenosis  by TTE with a mean PG of 40mm Hgand PAOLO of 07cm2. 4. Triple vessel CAD.  5. Successful PCI to Proximal to mid LAD with ZESsx3.  DIAGNOSTIC RECOMMENDATIONS: Plavix for at least 6 months. 2. Aspirin 81mg  for 2 weeks, then to resume after Plavix is discontinued. 3. May resume  Eliquis tonite or in AM. 4. PPI or [id number] for GI protection. 5. CT  surgical evaluation for ZACARIAS. 6. Staged PCI to LCX after the ZACARIAS.  INTERVENTIONAL IMPRESSIONS: Moderate Pulmonary Hypertension. 2. Normal LV  function with LVEF of 65% on TTE from 11/2019. 3. Severe Aortic Stenosis  by TTE with a mean PG of 40mm Hg and PAOLO of 07cm2. 4. Triple vessel CAD.  5. Successful PCI to Proximal to mid LAD with ZESsx3.  INTERVENTIONAL RECOMMENDATIONS: Plavix for at least 6 months. 2. Aspirin  81mg for 2 weeks, then to resume after Plavix is discontinued. 3. May  resume Eliquis tonite or in AM. 4. PPI or [id number] for GI protection. 5.  CT surgical evaluation for ZACARIAS. 6. Staged PCI to LCX after the ZACARIAS.  Prepared and signed by  Clay Cary M.D.  Signed 06/05/2020 11:03:54    < end of copied text >      < from: Cardiac Cath Lab - Adult (07.24.20 @ 07:24) >  PROCEDURE:  --  Aortography.  --  Cardiac Fluoro.  --  Intubation-Non-cath Physician.  --  Sonosite.  --  TemporaryPacemaker-Int.  --  Transcatheter Aortic Valve Replacement.  --  Hemostasis with Angioseal-Intervention.  Local anesthetic given. Right femoral artery access. Left femoral artery  access. Right subclavian vein access. Aortography. A catheter was placed  and contrast was injected. Cardiac Fluoro. Intubation-Non-cath Physician.  Sonosite. Under general anesthesia, a semi-perm pacemaker was implanted  via the R SC vein by Dr. Carney. A 6Fr LFA sheath was inserted using a  modified Seldinger technique. A R Femoral cut-down was performed by dr. Carney. After accessing the RFA with an arterial needle, a stiff wire  was used to advance a 14Fr Reeves sheath under flouroscopic guidance.  After crossing the Aortic valve with a uniglide straightwire, a Confida  wire was advanced into the LV over a 5Fr AL1 catheter. A mm ES3 Ultra  valve was implanted at nominal pressure and placement was confirmed with a  Trans-thoracic Echocardiogram and less than mild PVL was noted and no  pericardial effusion was observed. A Descending Aortogram after the R FA  cut-down repair did not reveal any significant vascular complications and  suitability for a vascular closure device was confirmed for the L CFA. A 6  Fr Angio-seal was deployed in the L CFA. Please refer to dr. Carney's  operative report for surgical details. Temporary Pacemaker-Int.  Transcatheter Aortic Valve Replacement. Hemostasis with  Angioseal-Intervention.  COMPLICATIONS: There were no complications.  DIAGNOSTIC IMPRESSIONS: Successful deployment of a 23mm Reeves-Marybel S3  Ultra via a Right Femoral cut-down approach. 2. Successful implantation of  a semi-perm PM via R SCV. 3. Suceesful deployment of a 6Fr Angio-Seal to L  CFA. 4. Please refer to dr. Carney's Operative Report for surgical  details.  DIAGNOSTIC RECOMMENDATIONS: Avoid negative chronotropy for 24-36 hrs. 2.  Plavix for at least 6 months. 3. Resumption of OAC per CT surgical team.  4. TTE for obtaining data for submission to TAVR registry.  INTERVENTIONAL IMPRESSIONS: Successful deployment of a 23mm Reeves-Marybel  S3 Ultra via a Right Femoral cut-down approach. 2. Successful implantation  of a semi-perm PM via R SCV. 3. Suceesful deployment of a 6Fr Angio-Seal  to L CFA. 4. Please refer to dr. Carney's Operative Report for surgical  details.  INTERVENTIONAL RECOMMENDATIONS: Avoid negative chronotropy for 24-36 hrs.  2. Plavix for at least 6 months. 3. Resumption of OAC per CT surgical  team. 4. TTE for obtaining data for submission to TAVR registry.  Prepared and signed by  Clay Cary M.D.  Signed 07/24/2020 09:35:15    < end of copied text >    [ ] Stress Test:    OTHER: 	    LABS:	 	    CARDIAC MARKERS:                        11.7   7.37  )-----------( 105      ( 29 Jul 2020 02:37 )             36.7     07-29    135  |  100  |  38.0<H>  ----------------------------<  116<H>  4.3   |  27.0  |  0.93    Ca    8.2<L>      29 Jul 2020 02:37  Mg     1.9     07-29        ASSESSMENT / PLAN:     88 year old male h/o HTN, HLD, CAD s/p PCI to the LAD June 2020, with residual 90% LCx awaiting staged intervention and 100% ostial RCA , previously paroxysmal atrial fibrillation (last documented sinus rhythm June 5, 2020), 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  Post TAVR pt remains in AF with apparent CHB and regular escape of 30s-40s bpm at rest.  Beta blockers needed for CAD currently on hold secondary to conduction disease. TVP to right chest wall. Plan for Micra leadless pacemaker today.     -Micra PPM placement   -2 units prbc on hold for procedure  -s/p PCI to LAD (6/2020), staged RCA  when pt stable  -PT in slow afib post TAVR; now planned for micra ppm 2/2 CHB and regular escape 30-40 bmp at rest  -avn blockers have been on hold, will plan to resume BB post ppm placement   -f/u CTS regarding AC when able Department of Cardiology                                                     [facility]/[place] [address] [address] [place]                                               Telephone: [phone]. Fax:[phone]                                                             Pre-Procedure  NP Note      Narrative:      88 year old male h/o HTN, HLD, CAD s/p PCI to the LAD June 2020, with residual 90% LCx awaiting staged intervention and 100% ostial RCA , previously paroxysmal atrial fibrillation (last documented sinus rhythm June 5, 2020), 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  Post TAVR pt remains in AF with apparent CHB and regular escape of 30s-40s bpm at rest.  Beta blockers needed for CAD currently on hold secondary to conduction disease. Plan for Micra leadless pacemaker today.     	  MEDICATIONS:  tamsulosin 0.4 milliGRAM(s) Oral at bedtime  acetaminophen   Tablet .. 650 milliGRAM(s) Oral every 6 hours PRN  atorvastatin 80 milliGRAM(s) Oral at bedtime  finasteride 5 milliGRAM(s) Oral daily  aspirin enteric coated 81 milliGRAM(s) Oral daily  clopidogrel Tablet 75 milliGRAM(s) Oral daily  hemorrhoidal Ointment 1 Application(s) Rectal three times a day PRN  sodium chloride 0.9% lock flush 3 milliLiter(s) IV Push every 8 hours      PHYSICAL EXAM:    T(C): 37.2 (07-29-20 @ 12:00), Max: 37.7 (07-28-20 @ 20:00)  HR: 60 (07-29-20 @ 12:00) (59 - 60)  BP: 142/64 (07-29-20 @ 12:00) (93/51 - 142/64)  RR: 24 (07-29-20 @ 12:00) (16 - 31)  SpO2: 97% (07-29-20 @ 12:00) (96% - 99%)  Wt(kg): --    I&O's Summary    28 Jul 2020 07:01  -  29 Jul 2020 07:00  --------------------------------------------------------  IN: 1190 mL / OUT: 1070 mL / NET: 120 mL    29 Jul 2020 07:01  -  29 Jul 2020 12:45  --------------------------------------------------------  IN: 0 mL / OUT: 320 mL / NET: -320 mL      Constitutional: A & O x 3  HEENT:   Normal oral mucosa, PERRL, EOMI	  Cardiovascular: Normal S1 S2, No JVD, No murmurs, No edema  Respiratory: Lungs clear to auscultation	  Gastrointestinal:  Soft, Non-tender, + BS	  Skin: No rashes, No ecchymoses, No cyanosis  Neurologic: Non-focal  Extremities: Normal range of motion, No clubbing, cyanosis or edema  Vascular: Peripheral pulses palpable 2+ bilaterally    TELEMETRY: 	    ECG:  	    < from: 12 Lead ECG (07.25.20 @ 05:12) >   Line Ventricular-paced rhythm    Confirmed by Jose Bowden ([id number]) on 7/25/2020 9:28:57 AM    < end of copied text >    RADIOLOGY:   DIAGNOSTIC TESTING:  [X ] Echocardiogram:    < from: TTE Echo Complete w/ Contrast w/ Doppler (07.24.20 @ 08:19) >  Summary:   1. Normal global left ventricular systolic function.   2. Left ventricular ejection fraction, by visual estimation, is 70 to 75%.   3. The mitral in-flow pattern reveals no discernable A-wave, therefore no comment on diastolic function can be made.   4. Severely enlarged left atrium.   5. Mild mitral annular calcification.   6. Mildly dilated RV size and normal function, pacing wire noted.   7. Thickening of the anterior and posterior mitral valve leaflets with mild regurgitation.   8. TAVR (Marybel valve) in the aortic position. There is no clear evidence of paravalvular leak. Peak velocity of 3.0 m/s mildly elevated, mean gradient of 14 mmHg, Dimensionless index of 0.45, which suggests normal functio lindsay prosthesis.   9. There is no evidence of pericardial effusion.  10. Compared to prior study from 6/18/20, intervalplacement of a TAVR with preserved LV systolic function/EF.    Monae Jefferson DO Electronically signed on 7/24/2020 at 1:59:31 PM     *** Final ***    MONAE GUADARRAMA   This document has been electronically signed. Jul 24 2020  8:19AM      < end of copied text >      [ X]  Catheterization:    < from: Cardiac Cath Lab - Adult (06.05.20 @ 08:25) >  VENTRICLES: Global left ventricular function was normal. EF by echo was 65  %.  CORONARY VESSELS: The coronary circulation is co-dominant.  LM:  --  LM: Angiography showed minor luminal irregularities with no flow  limiting lesions.  LAD:   --  Mid LAD: There was a tubular 95 % stenosis. The lesion was  eccentric and moderately calcified.  CX:   --  Proximal circumflex: There was a tubular 90 % stenosis. The  lesion was eccentric and moderately calcified.  --  Mid circumflex: There was a diffuse 50 % stenosis. The lesion was  irregularly contoured and heavily calcified.  RCA:   --  Ostial RCA: There was a 100 % stenosis. This lesion is a chronic  total occlusion.  COMPLICATIONS: There were no complications.  DIAGNOSTIC IMPRESSIONS: Moderate Pulmonary Hypertension. 2. Normal LV  function with LVEF of 65% on TTE from 11/2019. 3. Severe Aortic Stenosis  by TTE with a mean PG of 40mm Hgand PAOLO of 07cm2. 4. Triple vessel CAD.  5. Successful PCI to Proximal to mid LAD with ZESsx3.  DIAGNOSTIC RECOMMENDATIONS: Plavix for at least 6 months. 2. Aspirin 81mg  for 2 weeks, then to resume after Plavix is discontinued. 3. May resume  Eliquis tonite or in AM. 4. PPI or [id number] for GI protection. 5. CT  surgical evaluation for ZACARIAS. 6. Staged PCI to LCX after the ZACARIAS.  INTERVENTIONAL IMPRESSIONS: Moderate Pulmonary Hypertension. 2. Normal LV  function with LVEF of 65% on TTE from 11/2019. 3. Severe Aortic Stenosis  by TTE with a mean PG of 40mm Hg and PAOLO of 07cm2. 4. Triple vessel CAD.  5. Successful PCI to Proximal to mid LAD with ZESsx3.  INTERVENTIONAL RECOMMENDATIONS: Plavix for at least 6 months. 2. Aspirin  81mg for 2 weeks, then to resume after Plavix is discontinued. 3. May  resume Eliquis tonite or in AM. 4. PPI or [id number] for GI protection. 5.  CT surgical evaluation for ZACARIAS. 6. Staged PCI to LCX after the ZACARIAS.  Prepared and signed by  Clay Cary M.D.  Signed 06/05/2020 11:03:54    < end of copied text >      < from: Cardiac Cath Lab - Adult (07.24.20 @ 07:24) >  PROCEDURE:  --  Aortography.  --  Cardiac Fluoro.  --  Intubation-Non-cath Physician.  --  Sonosite.  --  TemporaryPacemaker-Int.  --  Transcatheter Aortic Valve Replacement.  --  Hemostasis with Angioseal-Intervention.  Local anesthetic given. Right femoral artery access. Left femoral artery  access. Right subclavian vein access. Aortography. A catheter was placed  and contrast was injected. Cardiac Fluoro. Intubation-Non-cath Physician.  Sonosite. Under general anesthesia, a semi-perm pacemaker was implanted  via the R SC vein by Dr. Carney. A 6Fr LFA sheath was inserted using a  modified Seldinger technique. A R Femoral cut-down was performed by dr. Carney. After accessing the RFA with an arterial needle, a stiff wire  was used to advance a 14Fr Reeves sheath under flouroscopic guidance.  After crossing the Aortic valve with a uniglide straightwire, a Confida  wire was advanced into the LV over a 5Fr AL1 catheter. A mm ES3 Ultra  valve was implanted at nominal pressure and placement was confirmed with a  Trans-thoracic Echocardiogram and less than mild PVL was noted and no  pericardial effusion was observed. A Descending Aortogram after the R FA  cut-down repair did not reveal any significant vascular complications and  suitability for a vascular closure device was confirmed for the L CFA. A 6  Fr Angio-seal was deployed in the L CFA. Please refer to dr. Carney's  operative report for surgical details. Temporary Pacemaker-Int.  Transcatheter Aortic Valve Replacement. Hemostasis with  Angioseal-Intervention.  COMPLICATIONS: There were no complications.  DIAGNOSTIC IMPRESSIONS: Successful deployment of a 23mm Reeves-Marybel S3  Ultra via a Right Femoral cut-down approach. 2. Successful implantation of  a semi-perm PM via R SCV. 3. Suceesful deployment of a 6Fr Angio-Seal to L  CFA. 4. Please refer to dr. Carney's Operative Report for surgical  details.  DIAGNOSTIC RECOMMENDATIONS: Avoid negative chronotropy for 24-36 hrs. 2.  Plavix for at least 6 months. 3. Resumption of OAC per CT surgical team.  4. TTE for obtaining data for submission to TAVR registry.  INTERVENTIONAL IMPRESSIONS: Successful deployment of a 23mm Reeves-Marybel  S3 Ultra via a Right Femoral cut-down approach. 2. Successful implantation  of a semi-perm PM via R SCV. 3. Suceesful deployment of a 6Fr Angio-Seal  to L CFA. 4. Please refer to dr. Careny's Operative Report for surgical  details.  INTERVENTIONAL RECOMMENDATIONS: Avoid negative chronotropy for 24-36 hrs.  2. Plavix for at least 6 months. 3. Resumption of OAC per CT surgical  team. 4. TTE for obtaining data for submission to TAVR registry.  Prepared and signed by  Clay Cary M.D.  Signed 07/24/2020 09:35:15    < end of copied text >    [ ] Stress Test:    OTHER: 	    LABS:	 	    CARDIAC MARKERS:                        11.7   7.37  )-----------( 105      ( 29 Jul 2020 02:37 )             36.7     07-29    135  |  100  |  38.0<H>  ----------------------------<  116<H>  4.3   |  27.0  |  0.93    Ca    8.2<L>      29 Jul 2020 02:37  Mg     1.9     07-29        ASSESSMENT / PLAN:     88 year old male h/o HTN, HLD, CAD s/p PCI to the LAD June 2020, with residual 90% LCx awaiting staged intervention and 100% ostial RCA , previously paroxysmal atrial fibrillation (last documented sinus rhythm June 5, 2020), 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  Post TAVR pt remains in AF with apparent CHB and regular escape of 30s-40s bpm at rest.  Beta blockers needed for CAD currently on hold secondary to conduction disease. TVP to right chest wall.      -TVP to right chest wall, plan for Micra PPM placement today   -2 units prbc on hold for procedure  -s/p PCI to LAD (6/2020), staged RCA  when pt stable  -PT in slow afib post TAVR; now planned for micra ppm 2/2 CHB and regular escape 30-40 bmp at rest  -avn blockers have been on hold, will plan to resume BB post ppm placement   -f/u CTS regarding AC when able

## 2020-07-29 NOTE — PROGRESS NOTE ADULT - SUBJECTIVE AND OBJECTIVE BOX
SUBJECTIVE   "what it is, it is"     INTERIM HISTORY SIGNIFICANT FOR   patient cancelled again for pending blood cultures in the setting of fever on 7/27 w/ elevated procalcitonin       Patient is a 88y old  Male who presents with a chief complaint of per hpi (28 Jul 2020 09:15)    HPI:  88 year old male alert and oriented to time, place, situation presents accompanied by his daughter who offers additional information.    PMH significant for MI, HTN, A-fib, CAD, HLD and severe aortic stenosis. He is planning to undergo elective TAVR on July 24, 2020 with Dr. Carney.    Patient reports dyspnea on moderate exertion and fatigue which has become significantly worse over the past three weeks.    Pre operative cardiac cath. revealed obstructive CAD and is now s/p PCI to the LAD (June 2020). The patient has residual disease in the left circumflex and is planning to have a staged intervention once the TAVR is complete.    He denies any angina, palpitations, dizziness, syncope, shortness of breath at rest, orthopnea and PND.      His daughter reports that he does snore and at times has cessation of breathing during sleep however the patient has never been tested for NEREIDA. (15 Jul 2020 12:03)    OBJECTIVE  PAST MEDICAL & SURGICAL HISTORY:  Coronary artery disease: status post cardiac stents June 2020  Sinus bradycardia  Former tobacco use: quit 1984  Hyperlipidemia, mild  HTN (hypertension)  Moderate mitral regurgitation  AF (atrial fibrillation)  Severe aortic stenosis  S/P cataract surgery  H/O hernia repair    Magic Cup and ice cream TID RD OK (Unknown)  No Known Allergies    Home Medications:  aspirin 81 mg oral delayed release tablet: 1 tab(s) orally once a day (24 Jul 2020 06:04)  Eliquis 2.5 mg oral tablet: 1 tab(s) orally 2 times a day (24 Jul 2020 06:04)  finasteride 5 mg oral tablet: 1 tab(s) orally once a day (24 Jul 2020 06:04)  rosuvastatin 40 mg oral tablet: 1 tab(s) orally once a day (24 Jul 2020 06:04)  tamsulosin 0.4 mg oral capsule: 1 cap(s) orally once a day (24 Jul 2020 06:04)  valsartan 320 mg oral tablet: 1 tab(s) orally once a day (24 Jul 2020 06:04)  Zetia 10 mg oral tablet: 1 tab(s) orally once a day (24 Jul 2020 06:04)    VITALS  Currently in sinus rhythm with vitals as below  ICU Vital Signs Last 24 Hrs  T(C): 37.4 (29 Jul 2020 00:00), Max: 37.9 (28 Jul 2020 11:00)  T(F): 99.3 (29 Jul 2020 00:00), Max: 100.2 (28 Jul 2020 11:00)  HR: 60 (29 Jul 2020 03:00) (59 - 60)  BP: 96/50 (29 Jul 2020 03:00) (85/47 - 135/64)  BP(mean): 70 (29 Jul 2020 03:00) (61 - 92)  RR: 23 (29 Jul 2020 03:00) (14 - 31)  SpO2: 98% (29 Jul 2020 03:00) (96% - 99%)      LABS                        11.7   7.37  )-----------( 105      ( 29 Jul 2020 02:37 )             36.7     Culture - Urine (collected 27 Jul 2020 21:56)  Source: .Urine Catheterized  Final Report (28 Jul 2020 21:50):    No growth    07-29    135  |  100  |  38.0<H>  ----------------------------<  116<H>  4.3   |  27.0  |  0.93    Ca    8.2<L>      29 Jul 2020 02:37  Mg     1.9     07-29    CAPILLARY BLOOD GLUCOSE              IN/OUT    07-27-20 @ 07:01 - 07-28-20 @ 07:00  --------------------------------------------------------  IN: 1080 mL / OUT: 990 mL / NET: 90 mL    07-28-20 @ 07:01  -  07-29-20 @ 03:32  --------------------------------------------------------  IN: 1090 mL / OUT: 850 mL / NET: 240 mL      IMAGING  personally reviewed imaging   Xray Chest 1 View- PORTABLE-Routine:    EXAM:  XR CHEST PORTABLE ROUTINE 1V                          PROCEDURE DATE:  07/28/2020          INTERPRETATION:  CLINICAL INFORMATION: s/p tavr. s/p tavr. ADMDIAG1: I35.0 NONRHEUMATIC AORTIC (VALVE) ST/.    EXAM: AP chest.    COMPARISON: Prior radiograph dated 7/26/2020. CT chest 6/18/2020..    FINDINGS:  Right subclavian pacemaker lead with tip overlying the right ventricle.  Status post TAVR.  No focal consolidation.  No pleural effusions or pneumothorax.  The cardiomediastinal silhouette is within normal limits.      IMPRESSION: No focal consolidation.              CAMILLA LOBO M.D., ATTENDING RADIOLOGIST  This document has been electronically signed. Jul 28 2020 11:51AM               (07-28-20 @ 05:15)    CURRENT MEDICATIONS  MEDICATIONS  (STANDING):  aspirin enteric coated 81 milliGRAM(s) Oral daily  atorvastatin 80 milliGRAM(s) Oral at bedtime  clopidogrel Tablet 75 milliGRAM(s) Oral daily  finasteride 5 milliGRAM(s) Oral daily  lactobacillus acidophilus 1 Tablet(s) Oral two times a day  meropenem  IVPB 1000 milliGRAM(s) IV Intermittent every 8 hours  sodium chloride 0.9% lock flush 3 milliLiter(s) IV Push every 8 hours  tamsulosin 0.4 milliGRAM(s) Oral at bedtime  vancomycin  IVPB 500 milliGRAM(s) IV Intermittent every 12 hours    MEDICATIONS  (PRN):  acetaminophen   Tablet .. 650 milliGRAM(s) Oral every 6 hours PRN Temp greater or equal to 38.5C (101.3F)  hemorrhoidal Ointment 1 Application(s) Rectal three times a day PRN reduce swelling

## 2020-07-29 NOTE — CHART NOTE - NSCHARTNOTEFT_GEN_A_CORE
Source: Patient [x ]  Family [ ]   other [x ] EMR    Current Diet: Diet, Dysphagia 2 Mechanical Soft-Nectar Consistency Fluid:   Supplement Feeding Modality:  Oral  Ensure Enlive Servings Per Day:  3       Frequency:  Three Times a day (07-26-20 @ 12:58)  Diet, NPO after Midnight:      NPO Start Date: 28-Jul-2020,   NPO Start Time: 23:59 (07-28-20 @ 18:52)    Patient reports [ ] nausea  [ ] vomiting [ ] diarrhea [ ] constipation  [ ]chewing problems [x ] swallowing issues  [x ] other: Sore throat     PO intake:  < 50% [x ]   50-75%  [ ]   %  [ ]  other :    Source for PO intake [x ] Patient [ ] family [ ] chart [x ] staff [ ] other    Current Weight:   7/24: 52kg  7/15: 54kg    % Weight Change: 4# wt change over 9 days, unsure of accuracy of wt's, will continue to monitor wt's for trends (Generalized edema noted)     Pertinent Medications: MEDICATIONS  (STANDING):  aspirin enteric coated 81 milliGRAM(s) Oral daily  atorvastatin 80 milliGRAM(s) Oral at bedtime  clopidogrel Tablet 75 milliGRAM(s) Oral daily  finasteride 5 milliGRAM(s) Oral daily  lactobacillus acidophilus 1 Tablet(s) Oral two times a day  meropenem  IVPB 1000 milliGRAM(s) IV Intermittent every 8 hours  sodium chloride 0.9% lock flush 3 milliLiter(s) IV Push every 8 hours  tamsulosin 0.4 milliGRAM(s) Oral at bedtime  vancomycin  IVPB 500 milliGRAM(s) IV Intermittent every 12 hours    MEDICATIONS  (PRN):  acetaminophen   Tablet .. 650 milliGRAM(s) Oral every 6 hours PRN Temp greater or equal to 38.5C (101.3F)  hemorrhoidal Ointment 1 Application(s) Rectal three times a day PRN reduce swelling    Pertinent Labs: CBC Full  -  ( 29 Jul 2020 02:37 )  WBC Count : 7.37 K/uL  RBC Count : 3.92 M/uL  Hemoglobin : 11.7 g/dL  Hematocrit : 36.7 %  Platelet Count - Automated : 105 K/uL  Mean Cell Volume : 93.6 fl  Mean Cell Hemoglobin : 29.8 pg  Mean Cell Hemoglobin Concentration : 31.9 gm/dL  Auto Neutrophil # : x  Auto Lymphocyte # : x  Auto Monocyte # : x  Auto Eosinophil # : x  Auto Basophil # : x  Auto Neutrophil % : x  Auto Lymphocyte % : x  Auto Monocyte % : x  Auto Eosinophil % : x  Auto Basophil % : x        Skin: R groin cutdown site, L groin sheath site     Nutrition focused physical exam Previously conducted - found signs of malnutrition [ ]absent [x ]present    Subcutaneous fat loss: Severe [x ] Orbital fat pads region, [x ]Buccal fat region, [ ]Triceps region,  [x ]Thoracic     Muscle wasting: Severe [x ]Temples region, [x ]Clavicle region, [x ]Shoulder region, [ ]Scapula region, [ ]Interosseous region,  [ ]thigh region, [ ]Calf region    Estimated Needs:   [ x] no change since previous assessment  [ ] recalculated:     Current Nutrition Diagnosis:  Pt remains at high nutrition risk secondary to Severe-Chronic protein calorie malnutrition Related to inadequate protein energy intake with increased needs in setting severe aortic stenosis, S/P TAVR with post op dysphagia as evidenced by wt loss of 21#(15.5%) and physical signs of severe muscle mass loss in temple, clavicle and shoulder regions and severe fat loss in orbital and thoracic regions and generalized edema.  Pt remains on Dysphagia 2 mech soft diet with nectar liquids, tolerating dysphagia diet well with fair PO intake (approximately 50% at meals). Pt pending PPM placement (cancelled yesterday secondary to fever noted). Recommendations below:     Recommendations:   1. RX: MVI and Vit. C daily   2. Check wt daily to monitor trends  3. Continue with diet RX, diet consistency per SLP ]  4. Continue with Ensure TID (nectar consistency)   5. Encourage/Assistance with meals.     Monitoring and Evaluation:   [x ] PO intake [x ] Tolerance to diet prescription [X] Weights  [X] Follow up per protocol [X] Labs:

## 2020-07-29 NOTE — PROGRESS NOTE ADULT - ASSESSMENT
89 yo M PMH MI , HTN, A-Fib, CAD, HLD, Severe AS now s/p TAVR (, Commercial 23mm Reeves Marybel 3 Ultra) on 7/24.    7/24 Transfemoral TAVR via Right Common Femoral Artery Cutdown.  NCT# [id number], STS/ACC TVT Registry Patient ID# [id number].  - immediate post op pt with bardycardia in the 50's. EP consulted and pt recommended for PPM. Pt will undergo PPM when cleared- underlying rhythm checked - slow afib vs bradycardia in the 30s; will cont to hold betablockers,   - complicated by fever > 101; since seen by ID started on cheyanne and vanco pending blood cultures   - other issue of note- patient with urinary retention post op; will guero gonzalez at this time; severe protein malnutrition will continue with ensure supplementation, patient education

## 2020-07-30 ENCOUNTER — TRANSCRIPTION ENCOUNTER (OUTPATIENT)
Age: 85
End: 2020-07-30

## 2020-07-30 LAB
ANION GAP SERPL CALC-SCNC: 9 MMOL/L — SIGNIFICANT CHANGE UP (ref 5–17)
BUN SERPL-MCNC: 29 MG/DL — HIGH (ref 8–20)
CALCIUM SERPL-MCNC: 9 MG/DL — SIGNIFICANT CHANGE UP (ref 8.6–10.2)
CHLORIDE SERPL-SCNC: 101 MMOL/L — SIGNIFICANT CHANGE UP (ref 98–107)
CO2 SERPL-SCNC: 27 MMOL/L — SIGNIFICANT CHANGE UP (ref 22–29)
CREAT SERPL-MCNC: 0.88 MG/DL — SIGNIFICANT CHANGE UP (ref 0.5–1.3)
GLUCOSE SERPL-MCNC: 104 MG/DL — HIGH (ref 70–99)
HCT VFR BLD CALC: 39 % — SIGNIFICANT CHANGE UP (ref 39–50)
HGB BLD-MCNC: 12.7 G/DL — LOW (ref 13–17)
MAGNESIUM SERPL-MCNC: 2 MG/DL — SIGNIFICANT CHANGE UP (ref 1.6–2.6)
MCHC RBC-ENTMCNC: 30.5 PG — SIGNIFICANT CHANGE UP (ref 27–34)
MCHC RBC-ENTMCNC: 32.6 GM/DL — SIGNIFICANT CHANGE UP (ref 32–36)
MCV RBC AUTO: 93.8 FL — SIGNIFICANT CHANGE UP (ref 80–100)
PLATELET # BLD AUTO: 109 K/UL — LOW (ref 150–400)
POTASSIUM SERPL-MCNC: 4.9 MMOL/L — SIGNIFICANT CHANGE UP (ref 3.5–5.3)
POTASSIUM SERPL-SCNC: 4.9 MMOL/L — SIGNIFICANT CHANGE UP (ref 3.5–5.3)
RBC # BLD: 4.16 M/UL — LOW (ref 4.2–5.8)
RBC # FLD: 15.4 % — HIGH (ref 10.3–14.5)
SODIUM SERPL-SCNC: 136 MMOL/L — SIGNIFICANT CHANGE UP (ref 135–145)
WBC # BLD: 6.84 K/UL — SIGNIFICANT CHANGE UP (ref 3.8–10.5)
WBC # FLD AUTO: 6.84 K/UL — SIGNIFICANT CHANGE UP (ref 3.8–10.5)

## 2020-07-30 PROCEDURE — 99232 SBSQ HOSP IP/OBS MODERATE 35: CPT

## 2020-07-30 PROCEDURE — 71046 X-RAY EXAM CHEST 2 VIEWS: CPT | Mod: 26

## 2020-07-30 PROCEDURE — 93010 ELECTROCARDIOGRAM REPORT: CPT

## 2020-07-30 RX ORDER — MAGNESIUM SULFATE 500 MG/ML
2 VIAL (ML) INJECTION ONCE
Refills: 0 | Status: COMPLETED | OUTPATIENT
Start: 2020-07-30 | End: 2020-07-30

## 2020-07-30 RX ORDER — METOPROLOL TARTRATE 50 MG
12.5 TABLET ORAL
Refills: 0 | Status: DISCONTINUED | OUTPATIENT
Start: 2020-07-30 | End: 2020-07-31

## 2020-07-30 RX ORDER — APIXABAN 2.5 MG/1
2.5 TABLET, FILM COATED ORAL EVERY 12 HOURS
Refills: 0 | Status: DISCONTINUED | OUTPATIENT
Start: 2020-07-30 | End: 2020-07-31

## 2020-07-30 RX ORDER — METOPROLOL TARTRATE 50 MG
2.5 TABLET ORAL ONCE
Refills: 0 | Status: COMPLETED | OUTPATIENT
Start: 2020-07-30 | End: 2020-07-30

## 2020-07-30 RX ORDER — MAGNESIUM SULFATE 500 MG/ML
1 VIAL (ML) INJECTION ONCE
Refills: 0 | Status: DISCONTINUED | OUTPATIENT
Start: 2020-07-30 | End: 2020-07-30

## 2020-07-30 RX ADMIN — Medication 650 MILLIGRAM(S): at 08:26

## 2020-07-30 RX ADMIN — TAMSULOSIN HYDROCHLORIDE 0.4 MILLIGRAM(S): 0.4 CAPSULE ORAL at 20:17

## 2020-07-30 RX ADMIN — Medication 1 TABLET(S): at 05:07

## 2020-07-30 RX ADMIN — Medication 50 GRAM(S): at 09:15

## 2020-07-30 RX ADMIN — Medication 81 MILLIGRAM(S): at 11:42

## 2020-07-30 RX ADMIN — Medication 2.5 MILLIGRAM(S): at 09:15

## 2020-07-30 RX ADMIN — SODIUM CHLORIDE 3 MILLILITER(S): 9 INJECTION INTRAMUSCULAR; INTRAVENOUS; SUBCUTANEOUS at 20:16

## 2020-07-30 RX ADMIN — CLOPIDOGREL BISULFATE 75 MILLIGRAM(S): 75 TABLET, FILM COATED ORAL at 11:42

## 2020-07-30 RX ADMIN — PHENYLEPHRINE-SHARK LIVER OIL-MINERAL OIL-PETROLATUM RECTAL OINTMENT 1 APPLICATION(S): at 21:15

## 2020-07-30 RX ADMIN — APIXABAN 2.5 MILLIGRAM(S): 2.5 TABLET, FILM COATED ORAL at 17:50

## 2020-07-30 RX ADMIN — Medication 100 MILLIGRAM(S): at 05:07

## 2020-07-30 RX ADMIN — Medication 12.5 MILLIGRAM(S): at 17:50

## 2020-07-30 RX ADMIN — ATORVASTATIN CALCIUM 80 MILLIGRAM(S): 80 TABLET, FILM COATED ORAL at 20:17

## 2020-07-30 RX ADMIN — Medication 650 MILLIGRAM(S): at 09:15

## 2020-07-30 RX ADMIN — SODIUM CHLORIDE 3 MILLILITER(S): 9 INJECTION INTRAMUSCULAR; INTRAVENOUS; SUBCUTANEOUS at 05:40

## 2020-07-30 RX ADMIN — FINASTERIDE 5 MILLIGRAM(S): 5 TABLET, FILM COATED ORAL at 11:42

## 2020-07-30 RX ADMIN — SODIUM CHLORIDE 3 MILLILITER(S): 9 INJECTION INTRAMUSCULAR; INTRAVENOUS; SUBCUTANEOUS at 11:34

## 2020-07-30 RX ADMIN — Medication 1 TABLET(S): at 17:50

## 2020-07-30 NOTE — PROGRESS NOTE ADULT - ATTENDING COMMENTS
I have personally seen, examined, and participated in the care of this patient. I have reviewed all pertinent clinical information, including history, physical exam, plan, and the PA/NP's note and agree except as noted below.    Successful Micra implantation.  Semipermanent pacemaker left in place until 7/30 to ensure pacemaker stability.    Doe Yip MD  Clinical Cardiac Electrophysiology
I have personally seen, examined, and participated in the care of this patient. I have reviewed all pertinent clinical information, including history, physical exam, plan, and the PA/NP's note and agree except as noted below.    Normal device parameters this morning.  Patient with couplets and triplets on telemetry with different morphology than paced QRS -- suspect secondary to slack from semipermanent lead. Semi-permanent lead removed at bedside with no complications and reduction in ventricular ectopy.  L groin with no significant abnormalities.  2vCXR pending -- if OK, then stable for DC from EP standpoint.    Doe Yip MD  Clinical Cardiac Electrophysiology

## 2020-07-30 NOTE — PROGRESS NOTE ADULT - ASSESSMENT
87 yo M PMH MI , HTN, A-Fib, CAD, HLD, Severe AS now s/p TAVR (, Commercial 23mm Reeves Marybel 3 Ultra) on 7/24.    7/24 Transfemoral TAVR via Right Common Femoral Artery Cutdown.  NCT# [id number], STS/ACC TVT Registry Patient ID# [id number].  - immediate post op pt with bardycardia in the 50's. EP consulted and pt recommended for PPM. Pt will undergo PPM when cleared- underlying rhythm checked - slow afib vs bradycardia in the 30s; will cont to hold betablockers,   - complicated by fever > 101; since seen by ID started on cheyanne and vanco pending blood cultures   - other issue of note- patient with urinary retention post op; will guero gonzalez at this time; severe protein malnutrition will continue with ensure supplementation, patient education

## 2020-07-30 NOTE — PROGRESS NOTE ADULT - SUBJECTIVE AND OBJECTIVE BOX
Significant recent/past 24 hr events: patient with no acute events overnight, resting comfortably, tolerating dysphagia diet     Subjective:    Review of Systems: denies any complaints     Patient is a 88y old  Male who presents with a chief complaint of TAVR, post procedure CHB (29 Jul 2020 14:59)    HPI:  88 year old male alert and oriented to time, place, situation presents accompanied by his daughter who offers additional information.    PMH significant for MI, HTN, A-fib, CAD, HLD and severe aortic stenosis. He is planning to undergo elective TAVR on July 24, 2020 with Dr. Carney.    Patient reports dyspnea on moderate exertion and fatigue which has become significantly worse over the past three weeks.    Pre operative cardiac cath. revealed obstructive CAD and is now s/p PCI to the LAD (June 2020). The patient has residual disease in the left circumflex and is planning to have a staged intervention once the TAVR is complete.    He denies any angina, palpitations, dizziness, syncope, shortness of breath at rest, orthopnea and PND.      His daughter reports that he does snore and at times has cessation of breathing during sleep however the patient has never been tested for NEREIDA. (15 Jul 2020 12:03)    PAST MEDICAL & SURGICAL HISTORY:  Coronary artery disease: status post cardiac stents June 2020  Sinus bradycardia  Former tobacco use: quit 1984  Hyperlipidemia, mild  HTN (hypertension)  Moderate mitral regurgitation  AF (atrial fibrillation)  Severe aortic stenosis  S/P cataract surgery  H/O hernia repair    FAMILY HISTORY:  FH: heart disease: mother and father both passed in 60&#x27;s      Vitals   ICU Vital Signs Last 24 Hrs  T(C): 36.7 (30 Jul 2020 01:00), Max: 37.2 (29 Jul 2020 12:00)  T(F): 98.1 (30 Jul 2020 01:00), Max: 99 (29 Jul 2020 12:00)  HR: 60 (30 Jul 2020 01:00) (59 - 60)  BP: 145/70 (30 Jul 2020 01:00) (90/50 - 157/70)  BP(mean): 100 (30 Jul 2020 01:00) (65 - 101)  ABP: --  ABP(mean): --  RR: 16 (30 Jul 2020 01:00) (15 - 28)  SpO2: 97% (30 Jul 2020 01:00) (96% - 100%)      VENT SETTINGS         I&O's Detail    28 Jul 2020 07:01  -  29 Jul 2020 07:00  --------------------------------------------------------  IN:    Oral Fluid: 840 mL    Solution: 150 mL    Solution: 200 mL  Total IN: 1190 mL    OUT:    Indwelling Catheter - Urethral: 1070 mL  Total OUT: 1070 mL    Total NET: 120 mL      29 Jul 2020 07:01  -  30 Jul 2020 03:09  --------------------------------------------------------  IN:    Oral Fluid: 120 mL    Solution: 50 mL  Total IN: 170 mL    OUT:    Indwelling Catheter - Urethral: 920 mL  Total OUT: 920 mL    Total NET: -750 mL          LABS                        11.7   7.37  )-----------( 105      ( 29 Jul 2020 02:37 )             36.7     07-29    135  |  100  |  38.0<H>  ----------------------------<  116<H>  4.3   |  27.0  |  0.93    Ca    8.2<L>      29 Jul 2020 02:37  Mg     1.9     07-29                      MEDICATIONS  (STANDING):  aspirin enteric coated 81 milliGRAM(s) Oral daily  atorvastatin 80 milliGRAM(s) Oral at bedtime  ceFAZolin   IVPB 2000 milliGRAM(s) IV Intermittent every 8 hours  clopidogrel Tablet 75 milliGRAM(s) Oral daily  finasteride 5 milliGRAM(s) Oral daily  lactobacillus acidophilus 1 Tablet(s) Oral two times a day  sodium chloride 0.9% lock flush 3 milliLiter(s) IV Push every 8 hours  tamsulosin 0.4 milliGRAM(s) Oral at bedtime    MEDICATIONS  (PRN):  acetaminophen   Tablet .. 650 milliGRAM(s) Oral every 6 hours PRN Temp greater or equal to 38.5C (101.3F)  hemorrhoidal Ointment 1 Application(s) Rectal three times a day PRN reduce swelling    Allergies:  Magic Cup and ice cream TID RD OK (Unknown)  No Known Allergies      Physical Exam:   Constitutional: NAD, well-groomed, well-developed  HEENT: PERRLA, EOMI, no drainage or redness  Neck: supple,  No JVD  Respiratory: Breath Sounds equal & clear bilaterally to auscultation, no rales/rhonchi/wheezing, no accessory muscle use noted  Cardiovascular: Regular rate, regular rhythm, normal S1, S2; no murmurs or rub  Gastrointestinal: Soft, non-tender, non distended, + bowel sounds  Extremities: FREDERICK x 4, no peripheral edema, no cyanosis, no clubbing   Neurological: A+O x 3; speech clear and intact; no sensory, motor  deficits, normal reflexes  Skin: warm, dry, well perfused

## 2020-07-30 NOTE — PROGRESS NOTE ADULT - SUBJECTIVE AND OBJECTIVE BOX
POD #1 s/p MDT MICRA AV Leadless Pacemaker via LFV.  Feel well this am, left groin suture and right semi perm pacing lead removed without incident.  +NSVT, short, asymptomatic, likely related to semi perm removal. Currently AF, ventricular paced at 60bpm.    ECG: AF, vpaced at 60bpm  CXR PA/LAT: pending  MDT interrogation: VVI 60bpm, R wave 15.2mV, impedance 760ohms, threshold 0.5V @ 0.24msec    MEDICATIONS  (STANDING):  apixaban 2.5 milliGRAM(s) Oral every 12 hours  aspirin enteric coated 81 milliGRAM(s) Oral daily  atorvastatin 80 milliGRAM(s) Oral at bedtime  clopidogrel Tablet 75 milliGRAM(s) Oral daily  finasteride 5 milliGRAM(s) Oral daily  lactobacillus acidophilus 1 Tablet(s) Oral two times a day  magnesium sulfate  IVPB 1 Gram(s) IV Intermittent once  metoprolol tartrate 12.5 milliGRAM(s) Oral two times a day  metoprolol tartrate Injectable 2.5 milliGRAM(s) IV Push once  sodium chloride 0.9% lock flush 3 milliLiter(s) IV Push every 8 hours  tamsulosin 0.4 milliGRAM(s) Oral at bedtime      Vital Signs Last 24 Hrs  T(C): 36.9 (30 Jul 2020 07:00), Max: 37.2 (29 Jul 2020 12:00)  T(F): 98.4 (30 Jul 2020 07:00), Max: 99 (29 Jul 2020 12:00)  HR: 70 (30 Jul 2020 08:00) (59 - 70)  BP: 150/67 (30 Jul 2020 08:00) (90/50 - 157/70)  BP(mean): 96 (30 Jul 2020 08:00) (65 - 102)  RR: 26 (30 Jul 2020 08:00) (15 - 28)  SpO2: 97% (30 Jul 2020 08:00) (96% - 100%)    Physical Exam:  Constitutional: NAD, AAOx3  Cardiovascular: +S1S2 RRR  Right semi perm pacing lead site: lead removed without incident, tegaderm placed, dsg c/d/i, no bleeding, swelling, hematoma   Pulmonary: CTA b/l, unlabored  GI: soft NTND +BS  Left groin: suture removed, tegaderm placed, no bleeding, swelling, hematoma, +dp distal pulse     LABS:                        12.7   6.84  )-----------( 109      ( 30 Jul 2020 04:59 )             39.0     07-30    136  |  101  |  29.0<H>  ----------------------------<  104<H>  4.9   |  27.0  |  0.88    Ca    9.0      30 Jul 2020 04:59  Mg     2.0     07-30    TTE 7/24/20: EF 70-75%, severly enlarged LA, mild MAC, mild MR, +TAVR, in aortic position         A/P: 88y Male h/o HTN, HLD, CAD s/p PCI to the LAD June 2020, with residual 90% LCx awaiting staged intervention and 100% ostial RCA , previously paroxysmal atrial fibrillation (last documented sinus rhythm June 5, 2020), 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  Post TAVR pt remained in AF with apparent CHB and regular escape of 30s-40s bpm at rest.  Beta blockers needed for CAD but were held secondary to conduction disease.  Pt is now POD#1 status post uncomplicated Medtronic Micra AV leadless PPM Implant via LFV and removal of RACW semi-perm pacing lead.      -CXR PA/LAT today  -Pain control with PO analgesia PRN   -NO HEPARIN OR LOVENOX, INCLUDING PROPHYLACTIC/SUBCUT DOSING, UNTIL OTHERWISE ADVISED BY EP.   -resume beta blockers  -continue eliquis 2.5mg po bid fo AF   -outpatient f/u in 2 weeks for device interrogation [facility]  -further dispo per CTSx team   DW Dr Yip, agrees with above    Discharge Instructions:  -Remove left groin DSTAT dressing 7/31/2020  - Bruising at the groin, sometimes extending down the leg, and/or a small lump under the skin at the groin access site is normal and will resolve within 2 – 3 weeks.   - Occasional skipped beats or palpitations that last for a few beats are common and generally resolve within 1-2 months.   - You make walk and take stairs at a regular pace.   - Do not perform any exercise more strenuous than walking for 1 week.   - Do not strain or lift heavy objects for 1 week.  - You may shower the day after the procedure.  - Do not soak in water (such as tub baths, hot tubs, swimming, etc.) for 1 week.   - You may resume all other activities the day after the procedure.  Call your doctor if:   - you notice bleeding, redness, drainage, swelling, increased tenderness or a hot sensation around the catheter insertion site.   - your temperature is greater than 100 degrees F for more than 24 hours.  - your rapid heart rhythm returns.  - you have any questions or concerns regarding the procedure.  If significant bleeding and/or a large lump (the size of a golf ball or bigger) occurs:  - Lie flat and apply continuous direct pressure just above the puncture site for at least 10 minutes  - If the issue resolves, notify your physician immediately.    - If the bleeding cannot be controlled, please seek immediate medical attention.  If you experience increased difficulty breathing or chest pain, or if you faint or have dizzy spells, please seek immediate medical attention.  [facility] will call you to schedule a 2 week post op follow up, please call [phone] if you don't hear from them or need to be seen sooner.

## 2020-07-30 NOTE — PROGRESS NOTE ADULT - ASSESSMENT
88 year old male alert and oriented to time, place, situation presents accompanied by his daughter who offers additional information.  PMH significant for MI, HTN, A-fib, CAD, HLD and severe aortic stenosis. He is planning to undergo elective TAVR on July 24, 2020 with Dr. Carney.  Patient reports dyspnea on moderate exertion and fatigue which has become significantly worse over the past three weeks.  Pre operative cardiac cath. revealed obstructive CAD and is now s/p PCI to the LAD (June 2020). The patient has residual disease in the left circumflex and is planning to have a staged intervention once the TAVR is complete.  He denies any angina, palpitations, dizziness, syncope, shortness of breath at rest, orthopnea and PND.    His daughter reports that he does snore and at times has cessation of breathing during sleep however the patient has never been tested for NEREIDA. (15 Jul 2020 12:03)    He was admitted 7/24/20, and is s/p Transfemoral TAVR via Right Common Femoral Artery Cutdown. NCT# [id number], STS/ACC TVT Registry Patient ID# [id number].  Post operatively. he was noted with bradycardia in the 50's. EP consulted and pt recommended for PPM, planned for today 7/27- underlying rhythm checked - slow afib vs bradycardia in the 30s; will cont to hold beta-blockers,     Further noted to have urinary retention post op, requiring Gonzalez placement.    7/27 AM, patient was found with a fever of 101.2.  he has no real complaints, except for some chills.   Denies cough.       Impression:  Fevers  s/p TAVR   bradycardia,   permanent pacemaker  urinary retention requiring gonzalez     Plan:  - FEVERS resolved  - cultures remain negative  RIGHT femoral site does not appear infected  RIGHT IJ catheter site is stable  s/p PPM on 7/29/2020    Pre-procedural antibiotics as per Electrophysiology team.    ceFAZolin   IVPB   100 mL/Hr (07-30-20 @ 05:07)   100 mL/Hr (07-29-20 @ 21:35)       Will sign off at this time, please call back as needed.

## 2020-07-30 NOTE — PROGRESS NOTE ADULT - SUBJECTIVE AND OBJECTIVE BOX
[facility]  INFECTIOUS DISEASES AND INTERNAL MEDICINE at [place]  =======================================================  Tin Monahan MD  Diplomates American Board of Internal Medicine and Infectious Diseases  Tel  [phone]  Fax [phone]  =======================================================    [id number]  CLARISSE GUTIERRES   follow up:  fevers    fevers resolved  s/p placement of Leadless PPM on 7/29/2020             =======================================================  REVIEW OF SYSTEMS:  CONSTITUTIONAL:   no fevers  HEENT:  No diplopia or blurred vision.  No earache, sore throat or runny nose.  CARDIOVASCULAR:  No pressure, squeezing, strangling, tightness, heaviness or aching about the chest, neck, axilla or epigastrium.  RESPIRATORY:  No cough, shortness of breath  GASTROINTESTINAL:  No nausea, vomiting or diarrhea.  GENITOURINARY:  No dysuria, frequency or urgency. No Blood in urine  MUSCULOSKELETAL:  no joint aches, no muscle pain  SKIN:  No change in skin, hair or nails.  NEUROLOGIC:  No Headaches, seizures or weakness.  PSYCHIATRIC:  No disorder of thought or mood.  ENDOCRINE:  No heat or cold intolerance  HEMATOLOGICAL:  No easy bruising or bleeding.   =======================================================  Allergies  No Known Allergies    ======================================================  Physical Exam:  ============  (see vitals section below)    General:  No acute distress.  THIN  Eye: Pupils are equal, round and reactive to light, Extraocular movements are intact, Normal conjunctiva.  HENT: Normocephalic, Oral mucosa is moist, No pharyngeal erythema, No sinus tenderness.  Neck: Supple, No lymphadenopathy.  Respiratory: Lungs with fair air entry  Cardiovascular: Normal rate, Regular rhythm,   RIGHT IJ with Pacing wire   Gastrointestinal: Soft, Non-tender, Non-distended, Normal bowel sounds.  Genitourinary: No costovertebral angle tenderness.  + PEDERSON in place  Lymphatics: No lymphadenopathy neck,   Musculoskeletal: Normal range of motion, Normal strength.  Integumentary: No rash.  RIGHT FEMORAL INCISION SITE  with dressing in place  Neurologic: Alert, Oriented, No focal deficits, Cranial Nerves II-XII are grossly intact.  Psychiatric: Appropriate mood & affect.    =======================================================    Vitals:  ============  T(F): 98.4 (30 Jul 2020 07:00), Max: 99 (29 Jul 2020 12:00)  HR: 70 (30 Jul 2020 08:00)  BP: 150/67 (30 Jul 2020 08:00)  RR: 26 (30 Jul 2020 08:00)  SpO2: 97% (30 Jul 2020 08:00) (96% - 100%)  temp max in last 48H T(F): , Max: 100.2 (07-28-20 @ 11:00)    =======================================================  Current Antibiotics:    Other medications:  apixaban 2.5 milliGRAM(s) Oral every 12 hours  aspirin enteric coated 81 milliGRAM(s) Oral daily  atorvastatin 80 milliGRAM(s) Oral at bedtime  clopidogrel Tablet 75 milliGRAM(s) Oral daily  finasteride 5 milliGRAM(s) Oral daily  lactobacillus acidophilus 1 Tablet(s) Oral two times a day  sodium chloride 0.9% lock flush 3 milliLiter(s) IV Push every 8 hours  tamsulosin 0.4 milliGRAM(s) Oral at bedtime      =======================================================  Labs:                        12.7   6.84  )-----------( 109      ( 30 Jul 2020 04:59 )             39.0      07-30    136  |  101  |  29.0<H>  ----------------------------<  104<H>  4.9   |  27.0  |  0.88    Ca    9.0      30 Jul 2020 04:59  Mg     2.0     07-30        Culture - Urine (collected 07-27-20 @ 21:56)  Source: .Urine Catheterized  Final Report (07-28-20 @ 21:50):    No growth    Culture - Blood (collected 07-27-20 @ 14:23)  Source: .Blood Blood    Culture - Blood (collected 07-27-20 @ 14:22)  Source: .Blood Blood      Creatinine, Serum: 0.88 mg/dL (07-30-20 @ 04:59)  Creatinine, Serum: 0.93 mg/dL (07-29-20 @ 02:37)  Creatinine, Serum: 0.94 mg/dL (07-28-20 @ 03:51)  Creatinine, Serum: 0.85 mg/dL (07-27-20 @ 02:52)  Creatinine, Serum: 0.93 mg/dL (07-26-20 @ 07:04)    Procalcitonin, Serum: 0.27 ng/mL (07-27-20 @ 13:22)       WBC Count: 6.84 K/uL (07-30-20 @ 04:59)  WBC Count: 7.37 K/uL (07-29-20 @ 02:37)  WBC Count: 8.49 K/uL (07-28-20 @ 03:51)  WBC Count: 8.46 K/uL (07-27-20 @ 02:52)

## 2020-07-31 ENCOUNTER — TRANSCRIPTION ENCOUNTER (OUTPATIENT)
Age: 85
End: 2020-07-31

## 2020-07-31 VITALS
DIASTOLIC BLOOD PRESSURE: 72 MMHG | TEMPERATURE: 98 F | OXYGEN SATURATION: 98 % | RESPIRATION RATE: 18 BRPM | HEART RATE: 73 BPM | SYSTOLIC BLOOD PRESSURE: 144 MMHG

## 2020-07-31 PROBLEM — I25.10 ATHEROSCLEROTIC HEART DISEASE OF NATIVE CORONARY ARTERY WITHOUT ANGINA PECTORIS: Chronic | Status: ACTIVE | Noted: 2020-07-15

## 2020-07-31 LAB
ANION GAP SERPL CALC-SCNC: 7 MMOL/L — SIGNIFICANT CHANGE UP (ref 5–17)
APTT BLD: 30.9 SEC — SIGNIFICANT CHANGE UP (ref 27.5–35.5)
BUN SERPL-MCNC: 34 MG/DL — HIGH (ref 8–20)
CALCIUM SERPL-MCNC: 8.8 MG/DL — SIGNIFICANT CHANGE UP (ref 8.6–10.2)
CHLORIDE SERPL-SCNC: 97 MMOL/L — LOW (ref 98–107)
CO2 SERPL-SCNC: 30 MMOL/L — HIGH (ref 22–29)
CREAT SERPL-MCNC: 0.78 MG/DL — SIGNIFICANT CHANGE UP (ref 0.5–1.3)
GLUCOSE SERPL-MCNC: 106 MG/DL — HIGH (ref 70–99)
HCT VFR BLD CALC: 38.7 % — LOW (ref 39–50)
HGB BLD-MCNC: 12.3 G/DL — LOW (ref 13–17)
INR BLD: 1.26 RATIO — HIGH (ref 0.88–1.16)
MAGNESIUM SERPL-MCNC: 2.3 MG/DL — SIGNIFICANT CHANGE UP (ref 1.6–2.6)
MCHC RBC-ENTMCNC: 29.8 PG — SIGNIFICANT CHANGE UP (ref 27–34)
MCHC RBC-ENTMCNC: 31.8 GM/DL — LOW (ref 32–36)
MCV RBC AUTO: 93.7 FL — SIGNIFICANT CHANGE UP (ref 80–100)
PLATELET # BLD AUTO: 134 K/UL — LOW (ref 150–400)
POTASSIUM SERPL-MCNC: 4.7 MMOL/L — SIGNIFICANT CHANGE UP (ref 3.5–5.3)
POTASSIUM SERPL-SCNC: 4.7 MMOL/L — SIGNIFICANT CHANGE UP (ref 3.5–5.3)
PROTHROM AB SERPL-ACNC: 14.5 SEC — HIGH (ref 10.6–13.6)
RBC # BLD: 4.13 M/UL — LOW (ref 4.2–5.8)
RBC # FLD: 14.9 % — HIGH (ref 10.3–14.5)
SODIUM SERPL-SCNC: 134 MMOL/L — LOW (ref 135–145)
WBC # BLD: 6.85 K/UL — SIGNIFICANT CHANGE UP (ref 3.8–10.5)
WBC # FLD AUTO: 6.85 K/UL — SIGNIFICANT CHANGE UP (ref 3.8–10.5)

## 2020-07-31 PROCEDURE — 74230 X-RAY XM SWLNG FUNCJ C+: CPT | Mod: 26

## 2020-07-31 PROCEDURE — 87086 URINE CULTURE/COLONY COUNT: CPT

## 2020-07-31 PROCEDURE — 76000 FLUOROSCOPY <1 HR PHYS/QHP: CPT

## 2020-07-31 PROCEDURE — 86901 BLOOD TYPING SEROLOGIC RH(D): CPT

## 2020-07-31 PROCEDURE — 92526 ORAL FUNCTION THERAPY: CPT

## 2020-07-31 PROCEDURE — P9045: CPT

## 2020-07-31 PROCEDURE — 82330 ASSAY OF CALCIUM: CPT

## 2020-07-31 PROCEDURE — 81001 URINALYSIS AUTO W/SCOPE: CPT

## 2020-07-31 PROCEDURE — 83735 ASSAY OF MAGNESIUM: CPT

## 2020-07-31 PROCEDURE — 74230 X-RAY XM SWLNG FUNCJ C+: CPT

## 2020-07-31 PROCEDURE — 86923 COMPATIBILITY TEST ELECTRIC: CPT

## 2020-07-31 PROCEDURE — 84100 ASSAY OF PHOSPHORUS: CPT

## 2020-07-31 PROCEDURE — 80048 BASIC METABOLIC PNL TOTAL CA: CPT

## 2020-07-31 PROCEDURE — C1898: CPT

## 2020-07-31 PROCEDURE — 99238 HOSP IP/OBS DSCHRG MGMT 30/<: CPT

## 2020-07-31 PROCEDURE — 85730 THROMBOPLASTIN TIME PARTIAL: CPT

## 2020-07-31 PROCEDURE — 82947 ASSAY GLUCOSE BLOOD QUANT: CPT

## 2020-07-31 PROCEDURE — 80202 ASSAY OF VANCOMYCIN: CPT

## 2020-07-31 PROCEDURE — 97163 PT EVAL HIGH COMPLEX 45 MIN: CPT

## 2020-07-31 PROCEDURE — 97530 THERAPEUTIC ACTIVITIES: CPT

## 2020-07-31 PROCEDURE — 84295 ASSAY OF SERUM SODIUM: CPT

## 2020-07-31 PROCEDURE — 85014 HEMATOCRIT: CPT

## 2020-07-31 PROCEDURE — 86900 BLOOD TYPING SEROLOGIC ABO: CPT

## 2020-07-31 PROCEDURE — 71046 X-RAY EXAM CHEST 2 VIEWS: CPT

## 2020-07-31 PROCEDURE — 80076 HEPATIC FUNCTION PANEL: CPT

## 2020-07-31 PROCEDURE — 92611 MOTION FLUOROSCOPY/SWALLOW: CPT

## 2020-07-31 PROCEDURE — C1894: CPT

## 2020-07-31 PROCEDURE — 36415 COLL VENOUS BLD VENIPUNCTURE: CPT

## 2020-07-31 PROCEDURE — 87040 BLOOD CULTURE FOR BACTERIA: CPT

## 2020-07-31 PROCEDURE — C1889: CPT

## 2020-07-31 PROCEDURE — C1760: CPT

## 2020-07-31 PROCEDURE — C1786: CPT

## 2020-07-31 PROCEDURE — 84132 ASSAY OF SERUM POTASSIUM: CPT

## 2020-07-31 PROCEDURE — 71045 X-RAY EXAM CHEST 1 VIEW: CPT

## 2020-07-31 PROCEDURE — 84145 PROCALCITONIN (PCT): CPT

## 2020-07-31 PROCEDURE — 85610 PROTHROMBIN TIME: CPT

## 2020-07-31 PROCEDURE — 85027 COMPLETE CBC AUTOMATED: CPT

## 2020-07-31 PROCEDURE — C8929: CPT

## 2020-07-31 PROCEDURE — 97116 GAIT TRAINING THERAPY: CPT

## 2020-07-31 PROCEDURE — 92610 EVALUATE SWALLOWING FUNCTION: CPT

## 2020-07-31 PROCEDURE — 33274 TCAT INSJ/RPL PERM LDLS PM: CPT

## 2020-07-31 PROCEDURE — 82803 BLOOD GASES ANY COMBINATION: CPT

## 2020-07-31 PROCEDURE — 83605 ASSAY OF LACTIC ACID: CPT

## 2020-07-31 PROCEDURE — C1887: CPT

## 2020-07-31 PROCEDURE — C1769: CPT

## 2020-07-31 PROCEDURE — 93005 ELECTROCARDIOGRAM TRACING: CPT

## 2020-07-31 PROCEDURE — 82435 ASSAY OF BLOOD CHLORIDE: CPT

## 2020-07-31 PROCEDURE — 86850 RBC ANTIBODY SCREEN: CPT

## 2020-07-31 RX ORDER — ACETAMINOPHEN 500 MG
2 TABLET ORAL
Qty: 0 | Refills: 0 | DISCHARGE
Start: 2020-07-31

## 2020-07-31 RX ORDER — METOPROLOL TARTRATE 50 MG
0.5 TABLET ORAL
Qty: 30 | Refills: 1
Start: 2020-07-31 | End: 2020-09-28

## 2020-07-31 RX ORDER — VALSARTAN 80 MG/1
1 TABLET ORAL
Qty: 0 | Refills: 0 | DISCHARGE

## 2020-07-31 RX ORDER — PHENYLEPHRINE-SHARK LIVER OIL-MINERAL OIL-PETROLATUM RECTAL OINTMENT
1 OINTMENT (GRAM) RECTAL
Qty: 0 | Refills: 0 | DISCHARGE
Start: 2020-07-31

## 2020-07-31 RX ADMIN — Medication 81 MILLIGRAM(S): at 09:52

## 2020-07-31 RX ADMIN — APIXABAN 2.5 MILLIGRAM(S): 2.5 TABLET, FILM COATED ORAL at 05:11

## 2020-07-31 RX ADMIN — FINASTERIDE 5 MILLIGRAM(S): 5 TABLET, FILM COATED ORAL at 09:52

## 2020-07-31 RX ADMIN — Medication 1 TABLET(S): at 05:11

## 2020-07-31 RX ADMIN — SODIUM CHLORIDE 3 MILLILITER(S): 9 INJECTION INTRAMUSCULAR; INTRAVENOUS; SUBCUTANEOUS at 05:09

## 2020-07-31 RX ADMIN — Medication 12.5 MILLIGRAM(S): at 05:10

## 2020-07-31 RX ADMIN — SODIUM CHLORIDE 3 MILLILITER(S): 9 INJECTION INTRAMUSCULAR; INTRAVENOUS; SUBCUTANEOUS at 13:27

## 2020-07-31 RX ADMIN — CLOPIDOGREL BISULFATE 75 MILLIGRAM(S): 75 TABLET, FILM COATED ORAL at 09:52

## 2020-07-31 NOTE — DISCHARGE NOTE NURSING/CASE MANAGEMENT/SOCIAL WORK - PATIENT PORTAL LINK FT
You can access the FollowMyHealth Patient Portal offered by [facility] by registering at the following website: [email or web address]. By joining [employer]’s FollowMyHealth portal, you will also be able to view your health information using other applications (apps) compatible with our system.

## 2020-07-31 NOTE — SWALLOW VFSS/MBS ASSESSMENT ADULT - SLP GENERAL OBSERVATIONS
Pt received & seen seated upright via stretcher in radiology, awake/alert, oriented, 0/10 pain, baseline cough/throat clearing

## 2020-07-31 NOTE — SWALLOW VFSS/MBS ASSESSMENT ADULT - NS SWALLOW VFSS REC ASPIR MON
change of breathing pattern/position upright (90Y)/gurgly voice/upper respiratory infection/fever/throat clearing/pneumonia/oral hygiene/cough

## 2020-07-31 NOTE — SWALLOW VFSS/MBS ASSESSMENT ADULT - DIAGNOSTIC IMPRESSIONS
Mild oral dysphagia for all PO with reduced lingual strength, & coordination. Pharyngeal stage of swallow grossly WFL, given pt age, for puree, mech soft, soft & cup sips of thin fluids. Pt was noted to throat clear t/o study: no PO observed in airway. +silent, trace penetration during the swallow via initial & only tspn presentation of thin fluids, however, NOT  observed with multiple trials of thin fluids via cup sips, as mentioned above.

## 2020-07-31 NOTE — PROGRESS NOTE ADULT - PROBLEM SELECTOR PROBLEM 1
Severe aortic stenosis

## 2020-07-31 NOTE — DISCHARGE NOTE PROVIDER - NSDCCPTREATMENT_GEN_ALL_CORE_FT
PRINCIPAL PROCEDURE  Procedure: TAVR, open femoral artery approach  Findings and Treatment: Transfemoral TAVR via Right Common Femoral Artery Cutdown (23mm Marybel 3 Ultra) (NCT# [id number]) (STS/ACC TVT Registry Patient ID# [id number])

## 2020-07-31 NOTE — PROGRESS NOTE ADULT - PROBLEM SELECTOR PLAN 1
S/p TAVR 7/24 with postop bradycardia vs slow afib noted requiring micra PPM with EP on 7/29.
patient is now s/p TAVR   7/29 micra PPM with EP
patient is now s/p TAVR   No other issues at this time  some urinary retention will restart flomax when able to swallow   speech and swallow eval tomorrow   Patient has screw in lead plan for micra PPM with EP monday
patient is now s/p TAVR   No other issues at this time  some urinary retention will restart flomax when able to swallow   speech and swallow eval tomorrow   Patient has screw in lead plan for micra PPM with EP monday
patient is now s/p TAVR   Patient has screw in lead plan for micra PPM with EP
patient is now s/p TAVR   Patient has screw in lead plan for micra PPM with EP
patient is now s/p TAVR   Patient has screw in lead plan for micra PPM with EP monday

## 2020-07-31 NOTE — PROGRESS NOTE ADULT - PROBLEM SELECTOR PROBLEM 3
Chronic diastolic congestive heart failure, NYHA class 3

## 2020-07-31 NOTE — SWALLOW VFSS/MBS ASSESSMENT ADULT - PHARYNGEAL PHASE COMMENTS
+retrograde from valleculae to oral cavity with ultimate progression through pharynx  +throat clearing post swallow: no PO observed in airway +throat clear post swallow: no PO observed in airway +throat clearing after all trials

## 2020-07-31 NOTE — DISCHARGE NOTE PROVIDER - NSDCFUSCHEDAPPT_GEN_ALL_CORE_FT
CLARISSE GUTIERRES ; 08/05/2020 ; NPP CT Surg [address] [facility] CLARISSE GUTIERRES ; 08/05/2020 ; NPP CT Surg [address] [facility] CLARISSE GUTIERRES ; 08/05/2020 ; NPP [address]   CLARISSE GUTIERRES ; 08/07/2020 ; NPP Urology [address] [facility] CLARISSE GUTIERRES ; 08/05/2020 ; NPP [address] S[address]   CLARISSE GUTIERRES ; 08/07/2020 ; NPP Urology [address] [facility]

## 2020-07-31 NOTE — PROGRESS NOTE ADULT - PROBLEM SELECTOR PLAN 2
Planned for staged PCI to CX as an outpatient with Cardiology.
plan for staged PCI to CX   follow cards

## 2020-07-31 NOTE — SWALLOW VFSS/MBS ASSESSMENT ADULT - ROSENBEK'S PENETRATION ASPIRATION SCALE
(1) no aspiration, contrast does not enter airway via cup sips; via initial & only tspn trials 3: contrast entered airway & remained above vocal cords/(1) no aspiration, contrast does not enter airway

## 2020-07-31 NOTE — SWALLOW VFSS/MBS ASSESSMENT ADULT - ORAL PHASE
Delayed oral transit time/Uncontrolled bolus / spillover in adryan-pharynx Uncontrolled bolus / spillover in adryan-pharynx/Delayed oral transit time Uncontrolled bolus / spillover in hypopharynx/Uncontrolled bolus / spillover in adryan-pharynx

## 2020-07-31 NOTE — DISCHARGE NOTE PROVIDER - NSDCMRMEDTOKEN_GEN_ALL_CORE_FT
aspirin 81 mg oral delayed release tablet: 1 tab(s) orally once a day  Eliquis 2.5 mg oral tablet: 1 tab(s) orally 2 times a day  finasteride 5 mg oral tablet: 1 tab(s) orally once a day  Plavix 75 mg oral tablet: 1 tab(s) orally once a day   rosuvastatin 40 mg oral tablet: 1 tab(s) orally once a day  tamsulosin 0.4 mg oral capsule: 1 cap(s) orally once a day  valsartan 320 mg oral tablet: 1 tab(s) orally once a day  Zetia 10 mg oral tablet: 1 tab(s) orally once a day acetaminophen 325 mg oral tablet: 2 tab(s) orally every 6 hours, As needed, Temp greater or equal to 38.5C (101.3F)  aspirin 81 mg oral delayed release tablet: 1 tab(s) orally once a day  Eliquis 2.5 mg oral tablet: 1 tab(s) orally 2 times a day  finasteride 5 mg oral tablet: 1 tab(s) orally once a day  metoprolol tartrate 25 mg oral tablet: 0.5 tab(s) orally 2 times a day   phenylephrine 0.25%-3% rectal ointment: 1 application rectal 3 times a day, As needed, reduce swelling  Plavix 75 mg oral tablet: 1 tab(s) orally once a day   rosuvastatin 40 mg oral tablet: 1 tab(s) orally once a day  tamsulosin 0.4 mg oral capsule: 1 cap(s) orally once a day  Zetia 10 mg oral tablet: 1 tab(s) orally once a day

## 2020-07-31 NOTE — DISCHARGE NOTE NURSING/CASE MANAGEMENT/SOCIAL WORK - NSDCFUADDAPPT_GEN_ALL_CORE_FT
Please follow up with Dr. Carney on Wed 8/5 @12PM    **Please arrive at [facility] ONE HOUR PRIOR TO APPOINTMENT TIME to get a CHEST XRAY (script in folder). Go directly to the Radiology Department.***    The cardiac surgery office is on the second floor of [facility]. If further directions are needed, please ask for assistance at the .    Follow up in 2 weeks for Pacemaker device interrogation at [facility]. Dr. Yip's office will call you to set up an appointment.    Follow up with Dr. Vega, urology for gonzalez removal and void trial Friday 8/7 @1:10 PM  [address] [place] [address] [address]  [phone] option 4    Please follow up with your Cardiologist and Primary Care Physician 2-4 weeks from discharge.

## 2020-07-31 NOTE — DISCHARGE NOTE PROVIDER - HOSPITAL COURSE
88 year old male patient with a medical history of an MI, HTN, A-Fib, CAD, HLD, Severe AS s/p TAVR (Commercial 23mm Reeves Marybel 3 Ultra) on 7/24 via Right Common Femoral Artery cutdown. Immediate post op patient with bradycardia vs. slow afib with rate in the 30's-50's. EP consulted and pt recommended for PPM. Patient became febrile and PPM implantation was cancelled initially. Broad spectrum antibiotics started and ID consulted. Urine and blood cultures were negative and patient remained afebrile. Cleared by ID for PPM. S/p Medtronic Micra AV leadless PPM Implant on 7/29. Other issues noted include postop urinary retention (patient planned to be discharged with his gonzalez), as well as severe protein malnutrition (Ensure supplementation and patient education provided).  Planned for staged PCI to CX as an outpatient with Cardiology. 88 year old male patient with a medical history of an MI, HTN, A-Fib, CAD, HLD, Severe AS s/p TAVR (Commercial 23mm Reeves Marybel 3 Ultra) on 7/24 via Right Common Femoral Artery cutdown. Immediate post op patient with bradycardia vs. slow afib with rate in the 30's-50's. EP consulted and pt recommended for PPM. Patient became febrile and PPM implantation was cancelled initially. Broad spectrum antibiotics started and ID consulted. Urine and blood cultures were negative and patient remained afebrile. Cleared by ID for PPM. S/p Medtronic Micra AV leadless PPM Implant on 7/29. Other issues noted include postop urinary retention (patient planned to be discharged with his gonzalez), as well as severe protein malnutrition (Ensure supplementation and patient education provided).  Planned for staged PCI to CX as an outpatient with Cardiology.     pt also with dysphagia postop, s/p MBS today cleared for soft with thin liquids.

## 2020-07-31 NOTE — DISCHARGE NOTE PROVIDER - PROVIDER TOKENS
PROVIDER:[TOKEN:[[id number]]],PROVIDER:[TOKEN:[[id number]]] PROVIDER:[TOKEN:[[id number]]],PROVIDER:[TOKEN:[[id number]]],PROVIDER:[TOKEN:[[id number]]] PROVIDER:[TOKEN:[[id number]]],PROVIDER:[TOKEN:[[id number]]],PROVIDER:[TOKEN:[[id number]]]

## 2020-07-31 NOTE — SWALLOW VFSS/MBS ASSESSMENT ADULT - SLP PERTINENT HISTORY OF CURRENT PROBLEM
Pt known to this dept & has been seen multiple times bedside this admission for dysphagia. Last seen 7/31 with RX for soft diet, nectar thick fluids & MBS

## 2020-07-31 NOTE — DISCHARGE NOTE PROVIDER - NSDCFUADDAPPT_GEN_ALL_CORE_FT
Please follow up with Dr. Carney on ________  at [facility].     **Please arrive at [facility] ONE HOUR PRIOR TO APPOINTMENT TIME to get a CHEST XRAY (script in folder). Go directly to the Radiology Department.***    The cardiac surgery office is on the second floor of [facility]. If further directions are needed, please ask for assistance at the .    Follow up in 2 weeks for Pacemaker device interrogation at [facility].    Please follow up with your Cardiologist and Primary Care Physician 2-4 weeks from discharge. Please follow up with Dr. Carney on Wed 8/5 @12PM    **Please arrive at [facility] ONE HOUR PRIOR TO APPOINTMENT TIME to get a CHEST XRAY (script in folder). Go directly to the Radiology Department.***    The cardiac surgery office is on the second floor of [facility]. If further directions are needed, please ask for assistance at the .    Follow up in 2 weeks for Pacemaker device interrogation at [facility]. Dr. Yip's office will call you to set up an appointment.    Follow up with Dr. Vega, urology for gonzalez removal and void trial Friday 8/7 @1:10 PM  [address] [place] [address] [address]  [phone] option 4    Please follow up with your Cardiologist and Primary Care Physician 2-4 weeks from discharge. Please follow up with Dr. Carney on Wed 8/5 @3:30PM.    Follow up with urology Dr. Hernández Wed 8/5 12:15PM for gonzalez removal and void trial.    **Please arrive at [facility] ONE HOUR PRIOR TO APPOINTMENT TIME to get a CHEST XRAY (script in folder). Go directly to the Radiology Department.***    The cardiac surgery office is on the second floor of [facility]. If further directions are needed, please ask for assistance at the .    Follow up in 2 weeks for Pacemaker device interrogation at [facility]. Dr. Yip's office will call you to set up an appointment.    Please follow up with your Cardiologist and Primary Care Physician 2-4 weeks from discharge.

## 2020-07-31 NOTE — DISCHARGE NOTE PROVIDER - NSDCFUADDINST_GEN_ALL_CORE_FT
Discharge Instructions:  - Bruising at the groin, sometimes extending down the leg, and/or a small lump under the skin at the groin access site is normal and will resolve within 2 – 3 weeks.   - Occasional skipped beats or palpitations that last for a few beats are common and generally resolve within 1-2 months.   - You make walk and take stairs at a regular pace.   - Do not perform any exercise more strenuous than walking for 1 week.   - Do not strain or lift heavy objects for 1 week.  - You may shower the day after the procedure.  - Do not soak in water (such as tub baths, hot tubs, swimming, etc.) for 1 week.   - You may resume all other activities the day after the procedure.    Call your doctor if:   - you notice bleeding, redness, drainage, swelling, increased tenderness or a hot sensation around the catheter insertion site.   - your temperature is greater than 100 degrees F for more than 24 hours.  - your rapid heart rhythm returns.  - you have any questions or concerns regarding the procedure.  If significant bleeding and/or a large lump (the size of a golf ball or bigger) occurs:  - Lie flat and apply continuous direct pressure just above the puncture site for at least 10 minutes  - If the issue resolves, notify your physician immediately.    - If the bleeding cannot be controlled, please seek immediate medical attention.  If you experience increased difficulty breathing or chest pain, or if you faint or have dizzy spells, please seek immediate medical attention.  [facility] will call you to schedule a 2 week post op follow up, please call [phone] if you don't hear from them or need to be seen sooner.    You may also call the Cardiothoracic Surgery office at [phone] if you are experiencing any shortness of breath, chest pain, fevers or chills, drainage from the incisions, persistent nausea, vomiting or if you have any questions about your medications. If the symptoms are severe, call [phone] and go to the nearest hospital. You can also call [phone] for an emergency [facility] ambulance, which will take you to the closest [facility].    If you need any assistance for making any appointments for a new consult or referral in any specialty, please call our [facility] Clinical Coordination Center at [phone].

## 2020-07-31 NOTE — DISCHARGE NOTE PROVIDER - NSDCACTIVITY_GEN_ALL_CORE
Do not make important decisions/Do not drive or operate machinery/Showering allowed/Walking - Indoors allowed/Stairs allowed/Walking - Outdoors allowed/No heavy lifting/straining

## 2020-07-31 NOTE — DISCHARGE NOTE PROVIDER - CARE PROVIDER_API CALL
Ravi Carney)  Surgery; Thoracic and Cardiac Surgery  [address] [place] [address]  Phone: [phone]  Fax: [phone]  Follow Up Time:     Doe Yip)  Cardiology; Internal Medicine  [address] [facility], [address]  [place] [address]  Phone: [phone]  Fax: [phone]  Follow Up Time: Ravi Carney)  Surgery; Thoracic and Cardiac Surgery  [address] [place] [address]  Phone: [phone]  Fax: [phone]  Follow Up Time:     Doe Yip)  Cardiology; Internal Medicine  [address] [facility], [address]  [place] [address]  Phone: [phone]  Fax: [phone]  Follow Up Time:     Gene Vega  UROLOGY  [address] [place] [address]  Phone: [phone]  Fax: [phone]  Follow Up Time: Ravi Carney)  Surgery; Thoracic and Cardiac Surgery  [address] [place] [address]  Phone: [phone]  Fax: [phone]  Follow Up Time:     Doe Yip)  Cardiology; Internal Medicine  [address] [facility], [address]  [place] [address]  Phone: [phone]  Fax: [phone]  Follow Up Time:     Wilmar Hernández  UROLOGY  [address] [facility] [address]  [place] [address]  Phone: [phone]  Fax: [phone]  Follow Up Time:

## 2020-07-31 NOTE — DISCHARGE NOTE PROVIDER - NSDCCPCAREPLAN_GEN_ALL_CORE_FT
PRINCIPAL DISCHARGE DIAGNOSIS  Diagnosis: Severe aortic stenosis  Assessment and Plan of Treatment: - Keep the groin site clean and dry.  You may shower and pat the site dry.  - Watch the site for signs of redness or drainage, these should be reported to your doctor immediately.  - You will receive a wallet card about your new valve in the mail.  Please carry it with you to present to anyone who may ask if you have any medical implants.  - Be sure to inform your doctors including your dentist about your valve since you will need to take antibiotics to reduce the risk of infection before certain medical and dental procedures.  - You will be given an appointment to follow up with your doctor in approximately 4 weeks.  It is important to keep this appointment so that your new valve can be assessed.  - You may resume all your normal activities.        SECONDARY DISCHARGE DIAGNOSES  Diagnosis: Complete heart block  Assessment and Plan of Treatment: - S/p PPM implantation 7/29/20.    Diagnosis: Chronic diastolic congestive heart failure, NYHA class 3  Assessment and Plan of Treatment: - Take all medications as prescribed and listed on your discharge paperwork.  - Please follow up with your Cardiologist and Primary Care Physician 2-4 weeks from discharge.    Diagnosis: Coronary artery disease involving native coronary artery of native heart without angina pectoris  Assessment and Plan of Treatment: - Take all medications as prescribed and listed on your discharge paperwork.  - Please follow up with your Cardiologist and Primary Care Physician 2-4 weeks from discharge.  - Try to follow a cardiac diet which includes a low salt / low fat diet.   - Plan is for cardiac stent placement with your Cardiologist after discharge. PRINCIPAL DISCHARGE DIAGNOSIS  Diagnosis: Severe aortic stenosis  Assessment and Plan of Treatment: - Keep the groin site clean and dry.  You may shower and pat the site dry.  - Watch the site for signs of redness or drainage, these should be reported to your doctor immediately.  - You will receive a wallet card about your new valve in the mail.  Please carry it with you to present to anyone who may ask if you have any medical implants.  - Be sure to inform your doctors including your dentist about your valve since you will need to take antibiotics to reduce the risk of infection before certain medical and dental procedures.  - You will be given an appointment to follow up with your doctor in approximately 4 weeks.  It is important to keep this appointment so that your new valve can be assessed.  - You may resume all your normal activities.        SECONDARY DISCHARGE DIAGNOSES  Diagnosis: Complete heart block  Assessment and Plan of Treatment: - S/p PPM implantation 7/29/20.    Diagnosis: Coronary artery disease involving native coronary artery of native heart without angina pectoris  Assessment and Plan of Treatment: - Take all medications as prescribed and listed on your discharge paperwork.  - Please follow up with your Cardiologist and Primary Care Physician 2-4 weeks from discharge.  - Try to follow a cardiac diet which includes a low salt / low fat diet.   - Plan is for cardiac stent placement with your Cardiologist after discharge.    Diagnosis: Chronic diastolic congestive heart failure, NYHA class 3  Assessment and Plan of Treatment: - Take all medications as prescribed and listed on your discharge paperwork.  - Please follow up with your Cardiologist and Primary Care Physician 2-4 weeks from discharge.

## 2020-07-31 NOTE — SWALLOW VFSS/MBS ASSESSMENT ADULT - MODE OF PRESENTATION
spoon/self fed self fed cup/spoon/fed by clinician/self fed Pt with prefernce for soft solids, therefore, regular solids not administered

## 2020-07-31 NOTE — PROGRESS NOTE ADULT - SUBJECTIVE AND OBJECTIVE BOX
POD # 7 s/p TAVR, POD #2 s/p PPM implantation    HPI:  88 year old male with PMH of MI, HTN, A-fib, CAD, HLD and severe aortic stenosis. He is planning to undergo elective TAVR on July 24, 2020 with Dr. Carney. Patient reports dyspnea on moderate exertion and fatigue which has become significantly worse over the past three weeks. Preoperative cardiac cath revealed obstructive CAD and is now s/p PCI to the LAD (June 2020). The patient has residual disease in the left circumflex and is planning to have a staged intervention once the TAVR is complete. He denies any angina, palpitations, dizziness, syncope, shortness of breath at rest, orthopnea and PND. His daughter reports that he does snore and at times has cessation of breathing during sleep however the patient has never been tested for NEREIDA. (15 Jul 2020 12:03)    PAST MEDICAL & SURGICAL HISTORY:  Coronary artery disease: status post cardiac stents June 2020  Sinus bradycardia  Former tobacco use: quit 1984  Hyperlipidemia, mild  HTN (hypertension)  Moderate mitral regurgitation  AF (atrial fibrillation)  Severe aortic stenosis  S/P cataract surgery  H/O hernia repair    FAMILY HISTORY:  FH: heart disease: mother and father both passed in 60&#x27;s    Brief Hospital Course: S/p TAVR (Commercial 23mm Reeves Marybel 3 Ultra) on 7/24 via Right Common Femoral Artery cutdown. Immediate post op patient with bradycardia vs. slow afib with rate in the 30's-50's. EP consulted and pt recommended for PPM. Patient became febrile and PPM implantation was cancelled initially. Broad spectrum antibiotics started and ID consulted. Urine and blood cultures were negative and patient remained afebrile. Cleared by ID for PPM. S/p Medtronic Micra AV leadless PPM Implant on 7/29. Other issues noted include postop urinary retention (patient planned to be discharged with his gonzalez). as well as severe protein malnutrition (Ensure supplementation and patient education provided).      Significant recent/past 24 hr events: None    Subjective: Patient lying in bed in no acute distress. Denies fevers, chills, lightheadedness, dizziness, HA, CP, palpitations, SOB, cough, abdominal pain, N/V, diarrhea, numbness/tingling in extremities, or any other acute complaints.  ROS negative x 10 systems except as noted above.    MEDICATIONS  (STANDING):  apixaban 2.5 milliGRAM(s) Oral every 12 hours  aspirin enteric coated 81 milliGRAM(s) Oral daily  atorvastatin 80 milliGRAM(s) Oral at bedtime  clopidogrel Tablet 75 milliGRAM(s) Oral daily  finasteride 5 milliGRAM(s) Oral daily  lactobacillus acidophilus 1 Tablet(s) Oral two times a day  metoprolol tartrate 12.5 milliGRAM(s) Oral two times a day  sodium chloride 0.9% lock flush 3 milliLiter(s) IV Push every 8 hours  tamsulosin 0.4 milliGRAM(s) Oral at bedtime    MEDICATIONS  (PRN):  acetaminophen   Tablet .. 650 milliGRAM(s) Oral every 6 hours PRN Temp greater or equal to 38.5C (101.3F)  hemorrhoidal Ointment 1 Application(s) Rectal three times a day PRN reduce swelling    Allergies:  No Known Allergies    Vitals   T(C): 36.8 (30 Jul 2020 20:40), Max: 37.1 (30 Jul 2020 12:00)  T(F): 98.2 (30 Jul 2020 20:40), Max: 98.8 (30 Jul 2020 12:00)  HR: 62 (31 Jul 2020 00:00) (59 - 70)  BP: 138/74 (30 Jul 2020 20:40) (97/52 - 150/67)  BP(mean): 84 (30 Jul 2020 20:00) (69 - 100)  RR: 18 (31 Jul 2020 00:00) (16 - 34)  SpO2: 100% (31 Jul 2020 00:00) (95% - 100%)      I&O's Detail    29 Jul 2020 07:01  -  30 Jul 2020 07:00  --------------------------------------------------------  IN:    Oral Fluid: 120 mL    Solution: 100 mL  Total IN: 220 mL    OUT:    Indwelling Catheter - Urethral: 1270 mL  Total OUT: 1270 mL    Total NET: -1050 mL      30 Jul 2020 07:01  -  31 Jul 2020 03:19  --------------------------------------------------------  IN:    Oral Fluid: 240 mL    Solution: 50 mL  Total IN: 290 mL    OUT:    Indwelling Catheter - Urethral: 560 mL  Total OUT: 560 mL    Total NET: -270 mL      Physical Exam  Constitutional: NAD, well developed, well nourished  Neuro: A+O x 3, non-focal, speech clear and intact  HEENT: NC/AT, oral mucosa pink and moist  Neck: supple  CV: regular rate, +S1S2  Pulm/chest: lung sounds CTA and equal bilaterally, no accessory muscle use noted  Abd: soft, NT, ND, +BS  Ext: FREDERICK x 4, no C/C/E, R groin with steri-strips c/d/i, Left groin soft with dressing in place c/d/i. +DP/PT b/l.   Skin: warm, well perfused  Psych: calm, appropriate affect    LABS                        12.7   6.84  )-----------( 109      ( 30 Jul 2020 04:59 )             39.0     07-30    136  |  101  |  29.0<H>  ----------------------------<  104<H>  4.9   |  27.0  |  0.88    Ca    9.0      30 Jul 2020 04:59  Mg     2.0     07-30      Last CXR:  < from: Xray Chest 2 Views PA/Lat (07.30.20 @ 10:17) >  FINDINGS:  Micra Transcatheter Pacing System cardiac device within RIGHT ventricle.  The airway is midline.  There are no airspace consolidations.  There is no pleural effusion or pneumothorax.  The heart size is within the limits of normal. Status post aortic valve replacement.  The visualized osseus structures are intact.  IMPRESSION:  No acute radiographic cardiopulmonary pathology.  < end of copied text >

## 2020-07-31 NOTE — SWALLOW VFSS/MBS ASSESSMENT ADULT - COMMENTS
As per MD note: "88 year old Male patient with a PMH of an MI, HTN, A-Fib, CAD, HLD, Severe AS s/p TAVR (Commercial 23mm Reeves Marybel 3 Ultra) on 7/24 via Right Common Femoral Artery cutdown. Immediate post op patient with bradycardia vs. slow afib with rate in the 30's-50's. EP consulted and pt recommended for PPM. Patient became febrile and PPM implantation was cancelled initially. Broad spectrum antibiotics started and ID consulted. Urine and blood cultures were negative and patient remained afebrile. Cleared by ID for PPM. S/p Medtronic Micra AV leadless PPM Implant on 7/29. Other issues noted include postop urinary retention (patient planned to be discharged with his gonzalez). as well as severe protein malnutrition (Ensure supplementation and patient education provided)."

## 2020-07-31 NOTE — DISCHARGE NOTE NURSING/CASE MANAGEMENT/SOCIAL WORK - NSDCPEPT PROEDHF_GEN_ALL_CORE
Report signs and symptoms to primary care provider/Call primary care provider for follow up after discharge/Monitor weight daily/Low salt diet/Activities as tolerated

## 2020-07-31 NOTE — DISCHARGE NOTE PROVIDER - CARE PROVIDERS DIRECT ADDRESSES
,DirectAddress_Unknown,DirectAddress_Unknown ,DirectAddress_Unknown,DirectAddress_Unknown,[email or web address] ,DirectAddress_Unknown,DirectAddress_Unknown,[email or web address]

## 2020-07-31 NOTE — PROGRESS NOTE ADULT - ASSESSMENT
88 year old Male patient with a PMH of an MI, HTN, A-Fib, CAD, HLD, Severe AS s/p TAVR (Commercial 23mm Reeves Marybel 3 Ultra) on 7/24 via Right Common Femoral Artery cutdown. Immediate post op patient with bradycardia vs. slow afib with rate in the 30's-50's. EP consulted and pt recommended for PPM. Patient became febrile and PPM implantation was cancelled initially. Broad spectrum antibiotics started and ID consulted. Urine and blood cultures were negative and patient remained afebrile. Cleared by ID for PPM. S/p Medtronic Micra AV leadless PPM Implant on 7/29. Other issues noted include postop urinary retention (patient planned to be discharged with his gonzalez). as well as severe protein malnutrition (Ensure supplementation and patient education provided).

## 2020-08-01 ENCOUNTER — TRANSCRIPTION ENCOUNTER (OUTPATIENT)
Age: 85
End: 2020-08-01

## 2020-08-01 LAB
CULTURE RESULTS: SIGNIFICANT CHANGE UP
CULTURE RESULTS: SIGNIFICANT CHANGE UP
SPECIMEN SOURCE: SIGNIFICANT CHANGE UP
SPECIMEN SOURCE: SIGNIFICANT CHANGE UP

## 2020-08-02 ENCOUNTER — EMERGENCY (EMERGENCY)
Facility: HOSPITAL | Age: 85
LOS: 1 days | Discharge: HOME | End: 2020-08-02
Attending: STUDENT IN AN ORGANIZED HEALTH CARE EDUCATION/TRAINING PROGRAM
Payer: MEDICARE

## 2020-08-02 ENCOUNTER — TRANSCRIPTION ENCOUNTER (OUTPATIENT)
Age: 85
End: 2020-08-02

## 2020-08-02 VITALS
WEIGHT: 119.93 LBS | DIASTOLIC BLOOD PRESSURE: 69 MMHG | HEART RATE: 72 BPM | HEIGHT: 64 IN | SYSTOLIC BLOOD PRESSURE: 127 MMHG | TEMPERATURE: 98 F | OXYGEN SATURATION: 98 % | RESPIRATION RATE: 18 BRPM

## 2020-08-02 DIAGNOSIS — Z98.890 OTHER SPECIFIED POSTPROCEDURAL STATES: Chronic | ICD-10-CM

## 2020-08-02 DIAGNOSIS — Z98.49 CATARACT EXTRACTION STATUS, UNSPECIFIED EYE: Chronic | ICD-10-CM

## 2020-08-02 PROCEDURE — 99283 EMERGENCY DEPT VISIT LOW MDM: CPT

## 2020-08-02 PROCEDURE — 99232 SBSQ HOSP IP/OBS MODERATE 35: CPT

## 2020-08-02 NOTE — ED STATDOCS - NSFOLLOWUPINSTRUCTIONS_ED_ALL_ED_FT
- Return immediately to the ER if you are unable to urinate.   - Please call tomorrow to schedule follow up appointment with your urologist.   - Please call to schedule follow up appointment with your primary care physician within 24-48 hours.  - Please seek immediate medical attention for any new/worsening, signs/symptoms, or concerns.    Feel better!

## 2020-08-02 NOTE — ED STATDOCS - CARE PROVIDER_API CALL
Gene Vega  UROLOGY  [address] [place] [address]  Phone: [phone]  Fax: [phone]  Follow Up Time:

## 2020-08-02 NOTE — ED ADULT NURSE REASSESSMENT NOTE - NS ED NURSE REASSESS COMMENT FT1
Pt in no apparent distress. Airway patent, breathing spontaneous and nonlabored. Pt A&Ox3 resting in stretcher. Brock removed without complications. Awaiting further orders.

## 2020-08-02 NOTE — ED STATDOCS - NS ED ROS FT
No fever/chills, No photophobia/eye pain/changes in vision, No ear pain/sore throat/dysphagia, No chest pain/palpitations, no SOB/cough/wheeze/stridor, No abdominal pain, No N/V/D, no frequency/discharge, (+) hematuria, (+) dysuria, No neck/back pain, no rash, no changes in neurological status/function.

## 2020-08-02 NOTE — ED CLERICAL - NS ED CLERK NOTE PRE-ARRIVAL INFORMATION; ADDITIONAL PRE-ARRIVAL INFORMATION
This patient is enrolled in the Follow Your Heart program and has undergone a cardiac surgery procedure and has active care navigation. This patient can be followed up by the care navigation team within 24 hours. To arrange close follow-up or to obtain additional clinical information about this patient, please call the contact number above.     Please call the cardiac surgery team once patient is registered at [phone] for consultation PRIOR to disposition decision.  The patient recently underwent a cardiac surgery procedure and the team can assist in acute medical management.

## 2020-08-02 NOTE — ED STATDOCS - PMH
AF (atrial fibrillation)    Coronary artery disease  status post cardiac stents June 2020  Former tobacco use  quit 1984  HTN (hypertension)    Hyperlipidemia, mild    Moderate mitral regurgitation    Severe aortic stenosis    Sinus bradycardia

## 2020-08-02 NOTE — ED ADULT TRIAGE NOTE - CHIEF COMPLAINT QUOTE
he was here last week had value replaced then needed a pacemaker they put a gonzalez in and today it is leaking around the cath there is blood and he is c/o burning

## 2020-08-02 NOTE — ED STATDOCS - OBJECTIVE STATEMENT
89 y/o M pt presents to the ED c/o gonzalez catheter complications.  Pt was discharged from [facility] 2 days ago s/p TAVR, was discharged on xarelto and with a gonzalez cath in place.  Pt states that the gonzalez is leaking, burning, and he has noticed some blood in the bag.  Denies .  No further acute complaints at this time.

## 2020-08-02 NOTE — ED STATDOCS - PATIENT PORTAL LINK FT
You can access the FollowMyHealth Patient Portal offered by [facility] by registering at the following website: [email or web address]. By joining [facility]’s FollowMyHealth portal, you will also be able to view your health information using other applications (apps) compatible with our system.

## 2020-08-02 NOTE — ED STATDOCS - ATTENDING CONTRIBUTION TO CARE
I performed a face to face history and physical exam of the patient and discussed their management with the resident/ACP. I reviewed the resident/ACP's note and agree with the documented findings and plan of care.    Pt gonzalez removed and able to void.  informed patient from the start that he is likely to develop urine retention since he is having hematuria but patient and daughter do not want gonzalez replaced. instructed to return if he is unable to void or has any other concerns.

## 2020-08-02 NOTE — ED STATDOCS - CLINICAL SUMMARY MEDICAL DECISION MAKING FREE TEXT BOX
Pt requesting to have Brock removed and trial void, will remove Brock and trial void, if unable to void will put in a new Brock.

## 2020-08-02 NOTE — ED STATDOCS - PROGRESS NOTE DETAILS
JAIRO Vasquez: Patient evaluated by intake physician. HPI/ROS/PE as noted above. Will follow up plan per intake physician and continue to assess patient. JAIRO Vasquez: Pending void trial. JAIRO Vasquez: Patient reports fully voided. No clots. Instructed patient if develops urge to urinate and unable to void, must return to ED immediately. JAIRO Vasquez: Patient reports fully voided. No clots. Instructed patient if develops urge to urinate and unable to void, must return to ED immediately. Patient with urology appointment scheduled for Wednesday.

## 2020-08-02 NOTE — ED STATDOCS - PHYSICAL EXAMINATION
Constitutional - well-developed; well nourished. Head - NCAT. Airway patent. Eyes - PERRL. CV - RRR. no murmur. no edema. Pulm - CTAB. Abd - soft, nt. no rebound. no guarding.  - Brock in place, blood in the bag. Neuro - A&Ox3. strength 5/5 x4. sensation intact x4. normal gait. Skin - No rash. MSK - normal ROM.

## 2020-08-03 ENCOUNTER — INPATIENT (INPATIENT)
Facility: HOSPITAL | Age: 85
LOS: 2 days | Discharge: HOME | DRG: 313 | End: 2020-08-06
Attending: THORACIC SURGERY (CARDIOTHORACIC VASCULAR SURGERY) | Admitting: THORACIC SURGERY (CARDIOTHORACIC VASCULAR SURGERY)
Payer: MEDICARE

## 2020-08-03 VITALS
TEMPERATURE: 98 F | DIASTOLIC BLOOD PRESSURE: 86 MMHG | HEIGHT: 64 IN | HEART RATE: 65 BPM | SYSTOLIC BLOOD PRESSURE: 159 MMHG | RESPIRATION RATE: 18 BRPM | OXYGEN SATURATION: 97 %

## 2020-08-03 DIAGNOSIS — R31.9 HEMATURIA, UNSPECIFIED: ICD-10-CM

## 2020-08-03 DIAGNOSIS — Z98.49 CATARACT EXTRACTION STATUS, UNSPECIFIED EYE: Chronic | ICD-10-CM

## 2020-08-03 DIAGNOSIS — Z95.2 PRESENCE OF PROSTHETIC HEART VALVE: Chronic | ICD-10-CM

## 2020-08-03 DIAGNOSIS — R33.9 RETENTION OF URINE, UNSPECIFIED: ICD-10-CM

## 2020-08-03 DIAGNOSIS — I48.91 UNSPECIFIED ATRIAL FIBRILLATION: ICD-10-CM

## 2020-08-03 DIAGNOSIS — Z98.890 OTHER SPECIFIED POSTPROCEDURAL STATES: Chronic | ICD-10-CM

## 2020-08-03 DIAGNOSIS — Z95.2 PRESENCE OF PROSTHETIC HEART VALVE: ICD-10-CM

## 2020-08-03 DIAGNOSIS — N40.0 BENIGN PROSTATIC HYPERPLASIA WITHOUT LOWER URINARY TRACT SYMPTOMS: ICD-10-CM

## 2020-08-03 DIAGNOSIS — R07.9 CHEST PAIN, UNSPECIFIED: ICD-10-CM

## 2020-08-03 LAB
ALBUMIN SERPL ELPH-MCNC: 3 G/DL — LOW (ref 3.3–5.2)
ALP SERPL-CCNC: 77 U/L — SIGNIFICANT CHANGE UP (ref 40–120)
ALT FLD-CCNC: 72 U/L — HIGH
ANION GAP SERPL CALC-SCNC: 11 MMOL/L — SIGNIFICANT CHANGE UP (ref 5–17)
ANION GAP SERPL CALC-SCNC: 12 MMOL/L — SIGNIFICANT CHANGE UP (ref 5–17)
APPEARANCE UR: SIGNIFICANT CHANGE UP
APTT BLD: 32 SEC — SIGNIFICANT CHANGE UP (ref 27.5–35.5)
AST SERPL-CCNC: 79 U/L — HIGH
BACTERIA # UR AUTO: NEGATIVE — SIGNIFICANT CHANGE UP
BASOPHILS # BLD AUTO: 0.03 K/UL — SIGNIFICANT CHANGE UP (ref 0–0.2)
BASOPHILS NFR BLD AUTO: 0.4 % — SIGNIFICANT CHANGE UP (ref 0–2)
BILIRUB SERPL-MCNC: 0.8 MG/DL — SIGNIFICANT CHANGE UP (ref 0.4–2)
BILIRUB UR-MCNC: NEGATIVE — SIGNIFICANT CHANGE UP
BLD GP AB SCN SERPL QL: SIGNIFICANT CHANGE UP
BUN SERPL-MCNC: 20 MG/DL — SIGNIFICANT CHANGE UP (ref 8–20)
BUN SERPL-MCNC: 20 MG/DL — SIGNIFICANT CHANGE UP (ref 8–20)
CALCIUM SERPL-MCNC: 8.4 MG/DL — LOW (ref 8.6–10.2)
CALCIUM SERPL-MCNC: 8.4 MG/DL — LOW (ref 8.6–10.2)
CHLORIDE SERPL-SCNC: 90 MMOL/L — LOW (ref 98–107)
CHLORIDE SERPL-SCNC: 91 MMOL/L — LOW (ref 98–107)
CO2 SERPL-SCNC: 23 MMOL/L — SIGNIFICANT CHANGE UP (ref 22–29)
CO2 SERPL-SCNC: 25 MMOL/L — SIGNIFICANT CHANGE UP (ref 22–29)
COLOR SPEC: ABNORMAL
CREAT SERPL-MCNC: 0.81 MG/DL — SIGNIFICANT CHANGE UP (ref 0.5–1.3)
CREAT SERPL-MCNC: 0.85 MG/DL — SIGNIFICANT CHANGE UP (ref 0.5–1.3)
DIFF PNL FLD: ABNORMAL
EOSINOPHIL # BLD AUTO: 0.11 K/UL — SIGNIFICANT CHANGE UP (ref 0–0.5)
EOSINOPHIL NFR BLD AUTO: 1.5 % — SIGNIFICANT CHANGE UP (ref 0–6)
EPI CELLS # UR: SIGNIFICANT CHANGE UP
GLUCOSE SERPL-MCNC: 100 MG/DL — HIGH (ref 70–99)
GLUCOSE SERPL-MCNC: 99 MG/DL — SIGNIFICANT CHANGE UP (ref 70–99)
GLUCOSE UR QL: NEGATIVE — SIGNIFICANT CHANGE UP
HCT VFR BLD CALC: 32.2 % — LOW (ref 39–50)
HCT VFR BLD CALC: 32.3 % — LOW (ref 39–50)
HGB BLD-MCNC: 10.7 G/DL — LOW (ref 13–17)
HGB BLD-MCNC: 10.9 G/DL — LOW (ref 13–17)
IMM GRANULOCYTES NFR BLD AUTO: 0.7 % — SIGNIFICANT CHANGE UP (ref 0–1.5)
INR BLD: 1.45 RATIO — HIGH (ref 0.88–1.16)
KETONES UR-MCNC: ABNORMAL
LEUKOCYTE ESTERASE UR-ACNC: ABNORMAL
LYMPHOCYTES # BLD AUTO: 1.09 K/UL — SIGNIFICANT CHANGE UP (ref 1–3.3)
LYMPHOCYTES # BLD AUTO: 15.1 % — SIGNIFICANT CHANGE UP (ref 13–44)
MCHC RBC-ENTMCNC: 30.2 PG — SIGNIFICANT CHANGE UP (ref 27–34)
MCHC RBC-ENTMCNC: 30.6 PG — SIGNIFICANT CHANGE UP (ref 27–34)
MCHC RBC-ENTMCNC: 33.1 GM/DL — SIGNIFICANT CHANGE UP (ref 32–36)
MCHC RBC-ENTMCNC: 33.9 GM/DL — SIGNIFICANT CHANGE UP (ref 32–36)
MCV RBC AUTO: 90.4 FL — SIGNIFICANT CHANGE UP (ref 80–100)
MCV RBC AUTO: 91.2 FL — SIGNIFICANT CHANGE UP (ref 80–100)
MONOCYTES # BLD AUTO: 0.59 K/UL — SIGNIFICANT CHANGE UP (ref 0–0.9)
MONOCYTES NFR BLD AUTO: 8.1 % — SIGNIFICANT CHANGE UP (ref 2–14)
NEUTROPHILS # BLD AUTO: 5.37 K/UL — SIGNIFICANT CHANGE UP (ref 1.8–7.4)
NEUTROPHILS NFR BLD AUTO: 74.2 % — SIGNIFICANT CHANGE UP (ref 43–77)
NITRITE UR-MCNC: NEGATIVE — SIGNIFICANT CHANGE UP
OSMOLALITY UR: 244 MOSM/KG — LOW (ref 300–1000)
PH UR: 8 — SIGNIFICANT CHANGE UP (ref 5–8)
PLATELET # BLD AUTO: 206 K/UL — SIGNIFICANT CHANGE UP (ref 150–400)
PLATELET # BLD AUTO: 210 K/UL — SIGNIFICANT CHANGE UP (ref 150–400)
POTASSIUM SERPL-MCNC: 4.1 MMOL/L — SIGNIFICANT CHANGE UP (ref 3.5–5.3)
POTASSIUM SERPL-MCNC: 4.4 MMOL/L — SIGNIFICANT CHANGE UP (ref 3.5–5.3)
POTASSIUM SERPL-SCNC: 4.1 MMOL/L — SIGNIFICANT CHANGE UP (ref 3.5–5.3)
POTASSIUM SERPL-SCNC: 4.4 MMOL/L — SIGNIFICANT CHANGE UP (ref 3.5–5.3)
POTASSIUM UR-SCNC: 19 MMOL/L — SIGNIFICANT CHANGE UP
PROT SERPL-MCNC: 5.9 G/DL — LOW (ref 6.6–8.7)
PROT UR-MCNC: 500 MG/DL
PROTHROM AB SERPL-ACNC: 16.5 SEC — HIGH (ref 10.6–13.6)
RBC # BLD: 3.54 M/UL — LOW (ref 4.2–5.8)
RBC # BLD: 3.56 M/UL — LOW (ref 4.2–5.8)
RBC # FLD: 14.4 % — SIGNIFICANT CHANGE UP (ref 10.3–14.5)
RBC # FLD: 14.5 % — SIGNIFICANT CHANGE UP (ref 10.3–14.5)
RBC CASTS # UR COMP ASSIST: SIGNIFICANT CHANGE UP /HPF (ref 0–4)
SODIUM SERPL-SCNC: 124 MMOL/L — LOW (ref 135–145)
SODIUM SERPL-SCNC: 127 MMOL/L — LOW (ref 135–145)
SODIUM UR-SCNC: 49 MMOL/L — SIGNIFICANT CHANGE UP
SP GR SPEC: 1.01 — SIGNIFICANT CHANGE UP (ref 1.01–1.02)
TROPONIN T SERPL-MCNC: 0.03 NG/ML — SIGNIFICANT CHANGE UP (ref 0–0.06)
TROPONIN T SERPL-MCNC: 0.04 NG/ML — SIGNIFICANT CHANGE UP (ref 0–0.06)
UROBILINOGEN FLD QL: NEGATIVE — SIGNIFICANT CHANGE UP
WBC # BLD: 7.04 K/UL — SIGNIFICANT CHANGE UP (ref 3.8–10.5)
WBC # BLD: 7.24 K/UL — SIGNIFICANT CHANGE UP (ref 3.8–10.5)
WBC # FLD AUTO: 7.04 K/UL — SIGNIFICANT CHANGE UP (ref 3.8–10.5)
WBC # FLD AUTO: 7.24 K/UL — SIGNIFICANT CHANGE UP (ref 3.8–10.5)
WBC UR QL: ABNORMAL

## 2020-08-03 PROCEDURE — 51703 INSERT BLADDER CATH COMPLEX: CPT

## 2020-08-03 PROCEDURE — 99223 1ST HOSP IP/OBS HIGH 75: CPT

## 2020-08-03 PROCEDURE — 93010 ELECTROCARDIOGRAM REPORT: CPT | Mod: 76

## 2020-08-03 PROCEDURE — 99222 1ST HOSP IP/OBS MODERATE 55: CPT

## 2020-08-03 PROCEDURE — 99220: CPT | Mod: CS

## 2020-08-03 PROCEDURE — 71045 X-RAY EXAM CHEST 1 VIEW: CPT | Mod: 26

## 2020-08-03 RX ORDER — FINASTERIDE 5 MG/1
5 TABLET, FILM COATED ORAL DAILY
Refills: 0 | Status: DISCONTINUED | OUTPATIENT
Start: 2020-08-03 | End: 2020-08-05

## 2020-08-03 RX ORDER — ATORVASTATIN CALCIUM 80 MG/1
80 TABLET, FILM COATED ORAL AT BEDTIME
Refills: 0 | Status: DISCONTINUED | OUTPATIENT
Start: 2020-08-03 | End: 2020-08-05

## 2020-08-03 RX ORDER — METOPROLOL TARTRATE 50 MG
12.5 TABLET ORAL
Refills: 0 | Status: DISCONTINUED | OUTPATIENT
Start: 2020-08-03 | End: 2020-08-05

## 2020-08-03 RX ORDER — TAMSULOSIN HYDROCHLORIDE 0.4 MG/1
0.4 CAPSULE ORAL AT BEDTIME
Refills: 0 | Status: DISCONTINUED | OUTPATIENT
Start: 2020-08-03 | End: 2020-08-03

## 2020-08-03 RX ORDER — TAMSULOSIN HYDROCHLORIDE 0.4 MG/1
0.4 CAPSULE ORAL AT BEDTIME
Refills: 0 | Status: DISCONTINUED | OUTPATIENT
Start: 2020-08-03 | End: 2020-08-05

## 2020-08-03 RX ORDER — UREA 15 G
15 POWDER IN PACKET (EA) ORAL
Refills: 0 | Status: DISCONTINUED | OUTPATIENT
Start: 2020-08-03 | End: 2020-08-05

## 2020-08-03 RX ORDER — ASPIRIN/CALCIUM CARB/MAGNESIUM 324 MG
81 TABLET ORAL DAILY
Refills: 0 | Status: DISCONTINUED | OUTPATIENT
Start: 2020-08-03 | End: 2020-08-03

## 2020-08-03 RX ORDER — ACETAMINOPHEN 500 MG
650 TABLET ORAL EVERY 6 HOURS
Refills: 0 | Status: DISCONTINUED | OUTPATIENT
Start: 2020-08-03 | End: 2020-08-05

## 2020-08-03 RX ORDER — ASPIRIN/CALCIUM CARB/MAGNESIUM 324 MG
81 TABLET ORAL DAILY
Refills: 0 | Status: DISCONTINUED | OUTPATIENT
Start: 2020-08-03 | End: 2020-08-05

## 2020-08-03 RX ORDER — APIXABAN 2.5 MG/1
2.5 TABLET, FILM COATED ORAL
Refills: 0 | Status: DISCONTINUED | OUTPATIENT
Start: 2020-08-03 | End: 2020-08-03

## 2020-08-03 RX ORDER — PHENYLEPHRINE-SHARK LIVER OIL-MINERAL OIL-PETROLATUM RECTAL OINTMENT
1 OINTMENT (GRAM) RECTAL THREE TIMES A DAY
Refills: 0 | Status: DISCONTINUED | OUTPATIENT
Start: 2020-08-03 | End: 2020-08-05

## 2020-08-03 RX ORDER — FINASTERIDE 5 MG/1
5 TABLET, FILM COATED ORAL DAILY
Refills: 0 | Status: DISCONTINUED | OUTPATIENT
Start: 2020-08-03 | End: 2020-08-03

## 2020-08-03 RX ORDER — FUROSEMIDE 40 MG
20 TABLET ORAL ONCE
Refills: 0 | Status: COMPLETED | OUTPATIENT
Start: 2020-08-03 | End: 2020-08-03

## 2020-08-03 RX ORDER — SODIUM CHLORIDE 5 G/100ML
1000 INJECTION, SOLUTION INTRAVENOUS
Refills: 0 | Status: DISCONTINUED | OUTPATIENT
Start: 2020-08-03 | End: 2020-08-04

## 2020-08-03 RX ORDER — ATORVASTATIN CALCIUM 80 MG/1
160 TABLET, FILM COATED ORAL AT BEDTIME
Refills: 0 | Status: DISCONTINUED | OUTPATIENT
Start: 2020-08-03 | End: 2020-08-03

## 2020-08-03 RX ORDER — CLOPIDOGREL BISULFATE 75 MG/1
75 TABLET, FILM COATED ORAL DAILY
Refills: 0 | Status: DISCONTINUED | OUTPATIENT
Start: 2020-08-03 | End: 2020-08-05

## 2020-08-03 RX ADMIN — Medication 20 MILLIGRAM(S): at 18:49

## 2020-08-03 RX ADMIN — FINASTERIDE 5 MILLIGRAM(S): 5 TABLET, FILM COATED ORAL at 12:27

## 2020-08-03 RX ADMIN — Medication 12.5 MILLIGRAM(S): at 18:49

## 2020-08-03 RX ADMIN — ATORVASTATIN CALCIUM 80 MILLIGRAM(S): 80 TABLET, FILM COATED ORAL at 21:43

## 2020-08-03 RX ADMIN — Medication 15 GRAM(S): at 18:49

## 2020-08-03 RX ADMIN — Medication 81 MILLIGRAM(S): at 12:27

## 2020-08-03 RX ADMIN — TAMSULOSIN HYDROCHLORIDE 0.4 MILLIGRAM(S): 0.4 CAPSULE ORAL at 21:43

## 2020-08-03 RX ADMIN — CLOPIDOGREL BISULFATE 75 MILLIGRAM(S): 75 TABLET, FILM COATED ORAL at 12:27

## 2020-08-03 NOTE — ED ADULT NURSE REASSESSMENT NOTE - NS ED NURSE REASSESS COMMENT FT1
Brock irrigated and flush w/o difficulty. Hematuria noted. MD Yen advised.
Pt resting comfortably in stretcher. Pt denies any chest pain at this time. Breathing even and unlabored. VSS. NAD. Awaiting bed.
Assumed pt care @0740 from night RN. Pt received A&Ox3, c/o groin and anus pain. Pt states he has burning upon urination. Pt has dark bloody urine noted in urinal. Bladder distention noted. Cardiology @ bedside. Pt states he has chest pain 6/20. EKG ordered and to be performed. Awaiting urology consult. Pt resting comfortably in stretcher. Pt remains on CM 63 bpm in afib. NAD. Pt breathing even and unlabored. Pt made aware of plan of care w/call bell in reach.

## 2020-08-03 NOTE — ED CDU PROVIDER DISPOSITION NOTE - ATTENDING CONTRIBUTION TO CARE
seen with ACP on rounds  CTS saw pt and wants to admit as he is less than 10 days post op   Agree with dispo seen with ACP on rounds  CTS saw pt and wants to admit as he is less than 10 days post op   Agree with dispo    addendum  spoke with daughter  reviewed results and consults  given phone number of hospital to call for update

## 2020-08-03 NOTE — CONSULT NOTE ADULT - SUBJECTIVE AND OBJECTIVE BOX
HPI: 88y Male pt with multiple medical and cardiac issues. Pt s/p recent TAVR, PPM insertion and d/c with gonzalez to leg bag for failed voiding trials.  Pt presented to ER 2 days ago with some drainage around his gonzalez catheter.  His gonzalez was d/c'd in the ED and pt has not been able to void.  Gonzalez cath attempts were made in the ED but were unsuccessful.  Called to see pt for gonzalez placement and hematuria.  Pt has some c/o lower abdominal pain and slight left sided chest discomfort.  No SOB, no diaphoresis, no radiation of pain.  pt has small amount of dark bloody urine in bedside urinal ~20ml, no clots seen.  Pt on antiplatelet Tx and eliquis.  Pt also has been seen and treated for an enlarged prostate PTA by Dr. Hernández. Pt on proscar and flomax.    PAST MEDICAL & SURGICAL HISTORY:  Coronary artery disease: status post cardiac stents June 2020  Sinus bradycardia  Former tobacco use: quit 1984  Hyperlipidemia, mild  HTN (hypertension)  Moderate mitral regurgitation  AF (atrial fibrillation)  Severe aortic stenosis  S/P cataract surgery  H/O hernia repair      apixaban 2.5 milliGRAM(s) Oral two times a day  aspirin  chewable 81 milliGRAM(s) Oral daily  atorvastatin 80 milliGRAM(s) Oral at bedtime  clopidogrel Tablet 75 milliGRAM(s) Oral daily  finasteride 5 milliGRAM(s) Oral daily  metoprolol tartrate 12.5 milliGRAM(s) Oral two times a day  tamsulosin 0.4 milliGRAM(s) Oral at bedtime      Magic Cup TID (Unknown)  No Known Allergies      T(C): 36.4 (08-03-20 @ 12:29), Max: 36.8 (08-03-20 @ 04:42)  HR: 65 (08-03-20 @ 12:29) (63 - 72)  BP: 159/76 (08-03-20 @ 12:29) (127/69 - 189/84)  RR: 18 (08-03-20 @ 12:29) (18 - 18)  SpO2: 99% (08-03-20 @ 12:29) (97% - 99%)                              10.9   7.24  )-----------( 206      ( 03 Aug 2020 05:16 )             32.2       08-03    127<L>  |  91<L>  |  20.0  ----------------------------<  99  4.1   |  25.0  |  0.81    Ca    8.4<L>      03 Aug 2020 06:47    TPro  5.9<L>  /  Alb  3.0<L>  /  TBili  0.8  /  DBili  x   /  AST  79<H>  /  ALT  72<H>  /  AlkPhos  77  08-03

## 2020-08-03 NOTE — CONSULT NOTE ADULT - ASSESSMENT
87 y/o M with PMH HTN, HLD, TAVR 7/24, Micra PPM for Afib slow RVR 7/29, CAD s/p PCI 6/5 MARE x 3 to LAD (Residual LCx 90%- awaiting staged intervention), presents to ED with c/o chest pain. Pt states pain in chest left sided, 6/10 no aggravating or alleviating factors, self limiting- 1 hour, non radiating. Also noted to have hematuria, bladder distention; seen in ED 8/2 for leakage from gonzalez catheter and hematuria, catheter removed, passed TOV discharged home. ED unable to pass catheter overnight, awaiting urology.      Hematuria- on eliquis; unable to void, awaiting urology  CAD- s/p PCI LAD 6/5; needs intervention on Lcx 90%; on asa plavix  Chest pain- trend troponin, EKG- V paced  AS- s/p TAVR  Afib slow ventricular response- on ELiquis, Micra PPM- rate controlled    full consult to follow 87 y/o M with PMH HTN, HLD, TAVR 7/24, Micra PPM for Afib slow RVR 7/29, CAD s/p PCI 6/5 MARE x 3 to LAD (Residual LCx 90%- awaiting staged intervention), presents to ED with c/o chest pain. Pt states pain in chest left sided, 6/10 no aggravating or alleviating factors, self limiting- 1 hour, non radiating. Also noted to have hematuria, bladder distention; seen in ED 8/2 for leakage from gonzalez catheter and hematuria, catheter removed, passed TOV discharged home. ED unable to pass catheter overnight, awaiting urology.    Hematuria  - on eliquis- ok to hold   - unable to void, awaiting urology    CAD  - s/p PCI LAD 6/5  - plan for stageed intervention on Lcx 90%;  - cont asa plavix    Chest pain  - trend troponin,  - EKG- V paced    Afib slow ventricular response  - hold ELiquis,   - s/p MDT Micra PPM, rate controlled 89 y/o M with PMH HTN, HLD, TAVR 7/24, Micra PPM for Afib slow RVR 7/29, CAD s/p PCI 6/5 MARE x 3 to LAD (Residual LCx 90%- awaiting staged intervention), presents to ED with c/o chest pain. Pt states pain in chest left sided, 6/10 no aggravating or alleviating factors, self limiting- 1 hour, non radiating. Also noted to have hematuria, bladder distention; seen in ED 8/2 for leakage from gonzalez catheter and hematuria, catheter removed, passed TOV discharged home. ED unable to pass catheter overnight, awaiting urology.    Hematuria  - on eliquis- ok to hold   - unable to void, awaiting urology    CAD  - s/p PCI LAD 6/5  - plan for out patient staged intervention on Lcx 90%; not planned this hospitalization  - cont asa plavix    Chest pain  - trend troponin,  - EKG- V paced    Afib slow ventricular response  - hold ELiquis,   - s/p MDT Micra PPM, rate controlled

## 2020-08-03 NOTE — ED CDU PROVIDER INITIAL DAY NOTE - MEDICAL DECISION MAKING DETAILS
89 yo male PMHx PMHx HTN, CAD w/stents, HLD, TAVR (replaced 7/24) A-fib, pacemaker presents to ED BIBA c/o chest pain lasting approx. 1 hour. Received 325mg ASA by EMS. Upon chart review, patient with known residual disease to left circumflex s/p PCI to LAD 6/2020 and is planned to have intervention. EKG placed rhythm, no STEMI. CTSx consulted, recommended patient to be placed in CDU for TTE. Cardiology consulted. Further management plan pending results and consult recommendations.

## 2020-08-03 NOTE — CONSULT NOTE ADULT - ASSESSMENT
89 y/o M with PMH HTN, HLD, TAVR 7/24, Micra PPM for Afib slow RVR 7/29, CAD s/p PCI 6/5 MARE x 3 to LAD (Residual LCx 90%- awaiting staged intervention), presents to ED with c/o chest pain. Pt states pain in chest left sided, 6/10 no aggravating or alleviating factors, self limiting- 1 hour, non radiating. Also noted to have hematuria, bladder distention; seen in ED 8/2 for leakage from gonzalez catheter and hematuria, catheter removed, passed TOV discharged home. ED unable to pass catheter overnight, awaiting urology.    Hematuria - Off AC, On ASA & Plavix,     CAD  - s/p PCI LAD 6/5  - plan for out patient staged intervention on Lcx 90%;     Chest pain  - EKG- V paced    Afib slow ventricular response  - s/p MDT Micra PPM, rate controlled    Hyponatremia    Criteria for Diagnosing SIADH    Decreased effective osmolality of the extracellular fluid (Posm <275 mOsmol/kg H2O).  Inappropriate urinary concentration (Uosm >100 mOsmol/kg H2O with normal renal function) at some level of plasma hypo-osmolality.  Clinical euvolemia, as defined by the absence of signs of hypovolemia (orthostasis, tachycardia, decreased skin turgor, dry mucous  membranes) or hypervolemia (subcutaneous edema, ascites).  Elevated urinary sodium excretion (>20-30 mmol/L) while on normal salt and water intake.  Absence of other potential causes of euvolemic hypo-osmolality: severe hypothyroidism, hypocortisolism (glucocorticoid insufficiency).  Normal renal function and absence of diuretic use, particularly thiazide diuretics.  H2O ¼ water; kg ¼ kilogram; mmol ¼ millimole; mOsmol ¼ milliosmole; Posm ¼ plasma osmolality; SIADH ¼ syndrome of inappropriate antidiuretic  hormone secretion; Uosm ¼ urine osmolality.:    Rec : 2% Lasix + Lasix

## 2020-08-03 NOTE — ED CDU PROVIDER INITIAL DAY NOTE - PROGRESS NOTE DETAILS
Unable to place Brock. Urology consulted. Seen on rounds with ACP  No CP , Bladder feels better after catheter placed. + hematuria.  cardio rec hold eliquis. Awaiting input from CTS re Eliquis bc of recent surgery. Urology note does not delineate further intervention. Repeat cbc to trend H/H. Will follow up and determine dispo.

## 2020-08-03 NOTE — PROGRESS NOTE ADULT - ASSESSMENT
88 year old male known to CT surgery s/p TAVR (7/24) and s/p Medtronic Micra AV leadless PPM Implant on 7/29 now presented to ED  c/o sudden onset left sided non- radiating dull chest pain for 1 hour, relieved with Asprin 325 mg. Patient was discharged home on 7/31 with Brock catheter for urinary retention.  Patient returned to ED on 8/2 with leaking around the Brock with noted hematuria for which the Brock was removed and patient was sent home after he passed TOV.  8/3: Patient noted to have drop in H/H 10.9/32.2 (from 12.3/38.7 on 7/31) also noted to have hyponatremia, Sodium 124 (from 134 on 7/31).     Plan:    Discussed with Dr. Carney  Will keep the patient in observation room  Stat Bladder scan to r/o urinary retention  ECHO to r/o pericardial effusion  Will repeat BMP r/o hyponatremia  ER ACP Sarah Vasquez made aware.

## 2020-08-03 NOTE — PROGRESS NOTE ADULT - SUBJECTIVE AND OBJECTIVE BOX
Subjective: 88 year old male known to CT surgery s/p TAVR (7/24) and s/p Medtronic Micra AV leadless PPM Implant on 7/29 now presented to ED  c/o chest pain, describes as sudden in onset while in bed, dull, left-sided non-radiating, lasting approx. 1 hour. Received 325mg by EMS which alleviated symptoms. Patient was discharged home on 7/31 returned back to ED with leaking around the Gonzalez with minimal hematuria, patient was discharged home discharged after passed TOV. yesterday from [facility] ED for complications of Gonzalez catheter. Patient on Xarelto.arged 7/31 s/p TAVR on 7/24 discharged yesterday from [facility] ED for complications of Gonzalez catheter. Patient on Xarelto.     Brief Hospital Course; Patient is with PMH of history of  MI, HTN, A-Fib, CAD, HLD, Severe AS s/p TAVR (Commercial 23mm Reeves Marybel 3 Ultra) on 7/24 via Right Common Femoral Artery cutdown. Immediate post op patient with bradycardia vs. slow afib with rate in the 30's-50's. EP consulted and pt recommended for PPM. Patient became febrile and PPM implantation was cancelled initially. Broad spectrum antibiotics started and ID consulted. Urine and blood cultures were negative and patient remained afebrile. Cleared by ID for PPM. S/p Medtronic Micra AV leadless PPM Implant on 7/29. Other issues noted include postop urinary retention (patient planned to be discharged with his gonzalez), as well as severe protein malnutrition (Ensure supplementation and patient education provided).  Planned for staged PCI to CX as an outpatient with Cardiology.   pt also with dysphagia postop, s/p MBS today cleared for soft with thin liquids.         VITAL SIGNS  Vital Signs Last 24 Hrs  T(C): 36.8 (08-03-20 @ 04:42), Max: 36.8 (08-03-20 @ 04:42)  T(F): 98.2 (08-03-20 @ 04:42), Max: 98.2 (08-03-20 @ 04:42)  HR: 65 (08-03-20 @ 04:42) (65 - 72)  BP: 159/86 (08-03-20 @ 04:42) (127/69 - 159/86)  RR: 18 (08-03-20 @ 04:42) (18 - 18)  SpO2: 97% (08-03-20 @ 04:42) (97% - 98%)  on (O2)              Telemetry/Alarms:    LVEF:     MEDICATIONS      PHYSICAL EXAM  General: well nourished, well developed, no acute distress  Neurology: alert and oriented x 3, nonfocal, no gross deficits  Respiratory: clear to auscultation bilaterally  CV: regular rate and rhythm, normal S1, S2  Abdomen: soft, nontender, nondistended, positive bowel sounds, last bowel movement   Extremities: warm, well perfused. no edema. + DP pulses  Incisions: midline sternal incision, + mepilex, c/d/i. sternum stable.  Chest tubes:   Epicardial Wires:    > EPM (settings) / isolated        Weights:  Daily Height in cm: 162.56 (02 Aug 2020 19:41)    Daily   Admit Wt: Drug Dosing Weight  Height (cm): 162.56 (03 Aug 2020 04:42)  Weight (kg): 54.4 (02 Aug 2020 19:41)  BMI (kg/m2): 20.6 (03 Aug 2020 04:42)  BSA (m2): 1.57 (03 Aug 2020 04:42)    All laboratory results, radiology and medications reviewed.    LABS  08-03    124<L>  |  90<L>  |  20.0  ----------------------------<  100<H>  4.4   |  23.0  |  0.85    Ca    8.4<L>      03 Aug 2020 05:16                                   10.9   7.24  )-----------( 206      ( 03 Aug 2020 05:16 )             32.2          PT/INR - ( 03 Aug 2020 05:16 )   PT: 16.5 sec;   INR: 1.45 ratio         PTT - ( 03 Aug 2020 05:16 )  PTT:32.0 sec    CAPILLARY BLOOD GLUCOSE               Today's CXR:    Today's EKG:    PAST MEDICAL & SURGICAL HISTORY:  Coronary artery disease: status post cardiac stents June 2020  Sinus bradycardia  Former tobacco use: quit 1984  Hyperlipidemia, mild  HTN (hypertension)  Moderate mitral regurgitation  AF (atrial fibrillation)  Severe aortic stenosis  S/P cataract surgery  H/O hernia repair Subjective: 88 year old male known to CT surgery s/p TAVR (7/24) and s/p Medtronic Micra AV leadless PPM Implant on 7/29 now presented to ED  c/o chest pain, describes as sudden in onset while in bed, dull, left-sided non-radiating, lasting approx. 1 hour. Received 325mg by EMS which alleviated symptoms. Patient was discharged home on 7/31 returned back to ED yesterday 8/2 with leaking around the Gonzalez with minimal hematuria. Gonzalez catheter was removed from ED and was discharged home same day after patient was passed TOV.     Brief Hospital Course; Patient is with PMH of history of  MI, HTN, A-Fib, CAD, HLD, Severe AS s/p TAVR (Commercial 23mm Reeves Marybel 3 Ultra) on 7/24 via Right Common Femoral Artery cutdown. Immediate post op patient with bradycardia vs. slow afib with rate in the 30's-50's. EP consulted and pt recommended for PPM. Patient became febrile and PPM implantation was cancelled initially. Broad spectrum antibiotics started and ID consulted. Urine and blood cultures were negative and patient remained afebrile. Cleared by ID for PPM. S/p Medtronic Micra AV leadless PPM Implant on 7/29. Other issues noted include postop urinary retention (patient planned to be discharged with his gonzalez), as well as severe protein malnutrition (Ensure supplementation and patient education provided).  Planned for staged PCI to CX as an outpatient with Cardiology.   pt also with dysphagia postop, s/p MBS today cleared for soft with thin liquids.         VITAL SIGNS  Vital Signs Last 24 Hrs  T(C): 36.8 (08-03-20 @ 04:42), Max: 36.8 (08-03-20 @ 04:42)  T(F): 98.2 (08-03-20 @ 04:42), Max: 98.2 (08-03-20 @ 04:42)  HR: 65 (08-03-20 @ 04:42) (65 - 72)  BP: 159/86 (08-03-20 @ 04:42) (127/69 - 159/86)  RR: 18 (08-03-20 @ 04:42) (18 - 18)  SpO2: 97% (08-03-20 @ 04:42) (97% - 98%)  on room air.         Telemetry/Alarms:    LVEF:     MEDICATIONS      PHYSICAL EXAM  General: well nourished, well developed, no acute distress  Neurology: alert and oriented x 3, nonfocal, no gross deficits  Respiratory: clear to auscultation bilaterally  CV: regular rate and rhythm, normal S1, S2  Abdomen: soft, nontender, nondistended, positive bowel sounds, last bowel movement   Extremities: warm, well perfused. no edema. + DP pulses  Incisions: midline sternal incision, + mepilex, c/d/i. sternum stable.  Chest tubes:   Epicardial Wires:    > EPM (settings) / isolated        Weights:  Daily Height in cm: 162.56 (02 Aug 2020 19:41)    Daily   Admit Wt: Drug Dosing Weight  Height (cm): 162.56 (03 Aug 2020 04:42)  Weight (kg): 54.4 (02 Aug 2020 19:41)  BMI (kg/m2): 20.6 (03 Aug 2020 04:42)  BSA (m2): 1.57 (03 Aug 2020 04:42)    All laboratory results, radiology and medications reviewed.    LABS  08-03    124<L>  |  90<L>  |  20.0  ----------------------------<  100<H>  4.4   |  23.0  |  0.85    Ca    8.4<L>      03 Aug 2020 05:16                                   10.9   7.24  )-----------( 206      ( 03 Aug 2020 05:16 )             32.2          PT/INR - ( 03 Aug 2020 05:16 )   PT: 16.5 sec;   INR: 1.45 ratio         PTT - ( 03 Aug 2020 05:16 )  PTT:32.0 sec    CAPILLARY BLOOD GLUCOSE               Today's CXR:    Today's EKG:    PAST MEDICAL & SURGICAL HISTORY:  Coronary artery disease: status post cardiac stents June 2020  Sinus bradycardia  Former tobacco use: quit 1984  Hyperlipidemia, mild  HTN (hypertension)  Moderate mitral regurgitation  AF (atrial fibrillation)  Severe aortic stenosis  S/P cataract surgery  H/O hernia repair Subjective: 88 year old male known to CT surgery s/p TAVR (7/24) and s/p Medtronic Micra AV leadless PPM Implant on 7/29 now presented to ED c/o sudden onset left sided non- radiating dull chest pain for 1 hour, relieved with Asprin 325 mg. Patient was originally discharged home on 7/31 with Gonzalez catheter for urinary retention. Patient was discharged home on 7/31 returned back to ED yesterday 8/2 with c/o leaking around the Gonzalez with noted hematuria. Gonzalez catheter was removed from ED and was discharged home same day after patient was passed TOV. Patient was on Eliquis at home. No bladder scan or CBC was performed prior to discharge.  On examination patient denies chest pain, palpitation, shortness of breath, dizziness, headache, vision changes, numbness, tingling, abdominal pain, N/V/D.     Brief Hospital Course; Patient is with PMH of history of  MI, HTN, A-Fib, CAD, HLD, Severe AS s/p TAVR (Commercial 23mm Reeves Marybel 3 Ultra) on 7/24 via Right Common Femoral Artery cutdown. Immediate post op patient with bradycardia vs. slow afib with rate in the 30's-50's. EP consulted and pt recommended for PPM. Patient became febrile and PPM implantation was cancelled initially. Broad spectrum antibiotics started and ID consulted. Urine and blood cultures were negative and patient remained afebrile. Cleared by ID for PPM. S/p Medtronic Micra AV leadless PPM Implant on 7/29. Other issues noted include postop urinary retention (patient planned to be discharged with his goznalez), as well as severe protein malnutrition (Ensure supplementation and patient education provided).  Planned for staged PCI to CX as an outpatient with Cardiology.   Patient also with dysphagia postop, s/p Modified Barium Swallow 7/31 and was cleared for soft with thin liquids.       VITAL SIGNS  Vital Signs Last 24 Hrs  T(C): 36.8 (08-03-20 @ 04:42), Max: 36.8 (08-03-20 @ 04:42)  T(F): 98.2 (08-03-20 @ 04:42), Max: 98.2 (08-03-20 @ 04:42)  HR: 65 (08-03-20 @ 04:42) (65 - 72)  BP: 159/86 (08-03-20 @ 04:42) (127/69 - 159/86)  RR: 18 (08-03-20 @ 04:42) (18 - 18)  SpO2: 97% (08-03-20 @ 04:42) (97% - 98%)  on room air.         Telemetry/Alarms:  Paced    MEDICATIONS      PHYSICAL EXAM  General: Well developed, well groomed, no acute distress  Neurology: alert and oriented x 3, nonfocal, no gross deficits  Respiratory: clear to auscultation bilaterally  CV: regular rate and rhythm, V paced, normal S1, S2. + diastolic murmur.   Abdomen: soft, nontender, nondistended, positive bowel sounds  : Non distended, non tender  Extremities: warm, well perfused. +2 BLE edema. + PP pulses bilaterally  Incisions: Right groin Steri-strips. Left groin incision, dry and intact.    Skin: Warm, well perfused      Weights:  Daily Height in cm: 162.56 (02 Aug 2020 19:41)    Daily   Admit Wt: Drug Dosing Weight  Height (cm): 162.56 (03 Aug 2020 04:42)  Weight (kg): 54.4 (02 Aug 2020 19:41)  BMI (kg/m2): 20.6 (03 Aug 2020 04:42)  BSA (m2): 1.57 (03 Aug 2020 04:42)    All laboratory results, radiology and medications reviewed.    LABS  08-03    124<L>  |  90<L>  |  20.0  ----------------------------<  100<H>  4.4   |  23.0  |  0.85    Ca    8.4<L>      03 Aug 2020 05:16                                   10.9   7.24  )-----------( 206      ( 03 Aug 2020 05:16 )             32.2          PT/INR - ( 03 Aug 2020 05:16 )   PT: 16.5 sec;   INR: 1.45 ratio         PTT - ( 03 Aug 2020 05:16 )  PTT:32.0 sec      PAST MEDICAL & SURGICAL HISTORY:  Coronary artery disease: status post cardiac stents June 2020  Sinus bradycardia  Former tobacco use: quit 1984  Hyperlipidemia, mild  HTN (hypertension)  Moderate mitral regurgitation  AF (atrial fibrillation)  Severe aortic stenosis  S/P cataract surgery  H/O hernia repair

## 2020-08-03 NOTE — ED CLERICAL - NS ED CLERK NOTE PRE-ARRIVAL INFORMATION; ADDITIONAL PRE-ARRIVAL INFORMATION
This patient is enrolled in the [facility] program and has undergone a cardiac surgery procedure and has active care navigation. This patient can be followed up by the care navigation team within 24 hours. To arrange close follow-up or to obtain additional clinical information about this patient, please call the contact number above.     Please call the cardiac surgery team once patient is registered at [phone] for consultation PRIOR to disposition decision.  The patient recently underwent a cardiac surgery procedure and the team can assist in acute medical management.

## 2020-08-03 NOTE — ED PROVIDER NOTE - PHYSICAL EXAMINATION
General: In NAD, non-diaphoretic, non-toxic appearing; frail appearing.   Skin: No rashes or lesions. Warm, dry, pale.  Eyes: Sclera anicteric, conjunctivae clear b/l. No discharge. PERRLA, EOMI.  Cardio: No lifts, heaves, visible pulsations. No thrills. Rate and rhythm regular, S1 & S2 clear. +Murmur.   PV: No edema of feet/ankles. No varicosities. No discoloration, pigmentation changes, ulcers or lesions. No calf swelling/tenderness. Pulses: b/l 2+ radial, DP, PT. Capillary refill <2 seconds.  Resp: Speaking in full sentences. Normal AP to lateral diameter, symmetrical excursion b/l. No chest wall tenderness. Breath sounds vesicular, symmetrical and without rales, rhonchi or wheezing b/l.  Abd: Non-distended. No scars, lesions, or visible pulsations. Soft, non-tender, no masses palpated. No rebound, guarding.   Neuro: A&Ox3. CN II-XII grossly intact. General: In NAD, non-diaphoretic, non-toxic appearing; frail appearing.   Skin: No rashes or lesions. Warm, dry, pale.  Eyes: Sclera anicteric, conjunctivae clear b/l. No discharge. PERRLA, EOMI.  Cardio: No lifts, heaves, visible pulsations. No thrills. Rate and rhythm regular, S1 & S2 clear. +Murmur.   PV: No edema of feet/ankles. Pulses: b/l 2+ radial, DP, PT. Capillary refill <2 seconds.  Resp: Speaking in full sentences. Normal AP to lateral diameter, symmetrical excursion b/l. No chest wall tenderness. Breath sounds vesicular, symmetrical and without rales, rhonchi or wheezing b/l.  Abd: Non-distended. No scars, lesions, or visible pulsations. Soft, non-tender, no masses palpated. No rebound, guarding.   Neuro: A&Ox3. CN II-XII grossly intact.

## 2020-08-03 NOTE — PROCEDURE NOTE - NSURITECHNIQUE_GU_A_CORE
The urinary drainage system is closed at the end of the procedure/The collection bag is below the level of the patient and urinary bladder/The site was cleaned with soap/water and sterile solution (betadine)/The catheter was appropriately lubricated/Sterile gloves were worn for the duration of the procedure/Proper hand hygiene was performed/A sterile drape was used to cover all adjacent areas/The catheter was secured with a securement device (e.g. StatLock)/All applicable medical record documentation is completed

## 2020-08-03 NOTE — ED ADULT TRIAGE NOTE - CHIEF COMPLAINT QUOTE
patient with complaints of chest pain which started last night but has since diminished. patient states that he had a pacemaker placed

## 2020-08-03 NOTE — CONSULT NOTE ADULT - SUBJECTIVE AND OBJECTIVE BOX
Consult requested by:  Dr. Carney  Reason for Consultation: Hyponatremia  History obtained by: Patient and medical record   obtained: No    Chief complaint:    Patient is a 88y old  Male who presents with a chief complaint of chest pain    HPI: 87 y/o M with PMH HTN, HLD, TAVR 7/24, Micra PPM for Afib slow RVR   7/29, CAD s/p PCI 6/5 MARE x 3 to LAD (Residual LCx 90%- awaiting staged intervention), presents to ED with c/o chest pain.   Pt states pain in chest left sided, 6/10 no aggravating or alleviating factors, self limiting- 1 hour, non radiating.   Also noted to have hematuria, bladder distention; seen in ED 8/2 for leakage from gonzalez catheter and hematuria, catheter removed, passed TOV discharged home.   ED unable to pass catheter overnight, awaiting urology.   Denies shortness of breath,  n/v/d, lightheaded, dizziness, syncope, near syncope.     REVIEW OF SYMPTOMS:     CONSTITUTIONAL: No fever, weight loss, or fatigue  ENMT:  No difficulty hearing, tinnitus, vertigo; No sinus or throat pain  NECK: No pain or stiffness  CARDIOVASCULAR: + chest pain; No dyspnea, syncope, palpitations, dizziness, Orthopnea, Paroxsymal nocturnal dyspnea  RESPIRATORY: No Dyspnea on exertion, Shortness of breath, cough, wheezing  : No dysuria, + hematuria, + urinary retention   GI: No dark color stool, no melena, no diarrhea, no constipation, + lower abdominal pain   NEURO: No headache, no dizziness, no slurred speech   MUSCULOSKELETAL: No joint pain or swelling; No muscle, back, or extremity pain  PSYCH: No agitation, no anxiety.    ALL OTHER REVIEW OF SYSTEMS ARE NEGATIVE.    PREVIOUS DIAGNOSTIC TESTING:    ECHO FINDINGS:    TTE Echo Complete w/ Contrast w/ Doppler (07.24.20 @ 08:19)  PHYSICIAN INTERPRETATION:  Left Ventricle: The left ventricular internal cavity size is normal.  Global LV systolic function was normal. Left ventricular ejection fraction, by visual estimation, is 70 to 75%. The mitral in-flow pattern reveals no discernable A-wave, therefore no comment on diastolic function can be made.  Right Ventricle: The right ventricular size is mildly enlarged. RV systolic function is normal.  Left Atrium: Severely enlarged left atrium.  Right Atrium: Normal right atrial size.  Pericardium: There is no evidence of pericardial effusion.  Mitral Valve: Thickening of the anterior and posterior mitral valve leaflets. There is mild mitral annular calcification. Mild mitral valve regurgitation is seen.  Tricuspid Valve: The tricuspid valve is normal in structure. Trivial tricuspid regurgitation is visualized.  Aortic Valve: The AoV prosthesis  reported size is unknown. No evidence of aortic valve regurgitation is seen.  Pulmonic Valve: Structurally normal pulmonic valve, with normal leaflet excursion. Trace pulmonic valve regurgitation.  In comparison to the previous echocardiogram(s): Prior examinations are available and were reviewed for comparison purposes. Compared to prior study from 6/18/20, interval placement of a TAVR with preserved LV systolic function/EF.     Summary:   1. Normal global left ventricular systolic function.   2. Left ventricular ejection fraction, by visual estimation, is 70 to 75%.   3. The mitral in-flow pattern reveals no discernable A-wave, therefore no comment on diastolic function can be made.   4. Severely enlarged left atrium.   5. Mild mitral annular calcification.   6. Mildly dilated RV size and normal function, pacing wire noted.   7. Thickening of the anterior and posterior mitral valve leaflets with mild regurgitation.   8. TAVR (Marybel valve) in the aortic position. There is no clear evidence of paravalvular leak. Peak velocity of 3.0 m/s mildly elevated, mean gradient of 14 mmHg, Dimensionless index of 0.45, which suggests normal functio lindsay prosthesis.   9. There is no evidence of pericardial effusion.  10. Compared to prior study from 6/18/20, interval placement of a TAVR with preserved LV systolic function/EF.    Christiano Jefferson DO Electronically signed on 7/24/2020 at 1:59:31 PM       CATHETERIZATION FINDINGS:     Cardiac Cath Lab - Adult (06.05.20 @ 08:25)   VENTRICLES: Global left ventricular function was normal. EF by echo was 65  %.  CORONARY VESSELS: The coronary circulation is co-dominant.  LM:  --  LM: Angiography showed minor luminal irregularities with no flow  limiting lesions.  LAD:   --  Mid LAD: There was a tubular 95 % stenosis. The lesion was  eccentric and moderately calcified.  CX:   --  Proximal circumflex: There was a tubular 90 % stenosis. The  lesion was eccentric and moderately calcified.  --  Mid circumflex: There was a diffuse 50 % stenosis. The lesion was  irregularly contoured and heavily calcified.  RCA:   --  Ostial RCA: There was a 100 % stenosis. This lesion is a chronic  total occlusion.  COMPLICATIONS: There were no complications.  DIAGNOSTIC IMPRESSIONS: Moderate Pulmonary Hypertension. 2. Normal LV  function with LVEF of 65% on TTE from 11/2019. 3. Severe Aortic Stenosis  by TTE with a mean PG of 40mm Hgand PAOLO of 07cm2. 4. Triple vessel CAD.  5. Successful PCI to Proximal to mid LAD with ZESsx3.  DIAGNOSTIC RECOMMENDATIONS: Plavix for at least 6 months. 2. Aspirin 81mg  for 2 weeks, then to resume after Plavix is discontinued. 3. May resume  Eliquis tonite or in AM. 4. PPI or [id number] for GI protection. 5. CT  surgical evaluation for ZACARIAS. 6. Staged PCI to LCX after the ZACARIAS.  INTERVENTIONAL IMPRESSIONS: Moderate Pulmonary Hypertension. 2. Normal LV  function with LVEF of 65% on TTE from 11/2019. 3. Severe Aortic Stenosis  by TTE with a mean PG of 40mm Hg and PAOLO of 07cm2. 4. Triple vessel CAD.  5. Successful PCI to Proximal to mid LAD with ZESsx3.  INTERVENTIONAL RECOMMENDATIONS: Plavix for at least 6 months. 2. Aspirin  81mg for 2 weeks, then to resume after Plavix is discontinued. 3. May  resume Eliquis tonite or in AM. 4. PPI or [id number] for GI protection. 5.  CT surgical evaluation for ZACARIAS. 6. Staged PCI to LCX after the ZACARIAS.    Prepared and signed by  Clay Cary M.D.  Signed 06/05/2020 11:03:54    ALLERGIES: Allergies  No Known Allergies    Intolerances  Magic Cup TID (Unknown)    PAST MEDICAL HISTORY    Coronary artery disease  Sinus bradycardia  Hyperlipidemia, mild  HTN (hypertension)  Moderate mitral regurgitation  AF (atrial fibrillation)  Severe aortic stenosis    PAST SURGICAL HISTORY  S/P cataract surgery  H/O hernia repair    FAMILY HISTORY:  FH: heart disease (Father): mother and father both passed in 60    SOCIAL HISTORY:  Former smoking/ Denies alcohol/drugs    CURRENT MEDICATIONS:  metoprolol tartrate 12.5 milliGRAM(s) Oral two times a day  tamsulosin 0.4 milliGRAM(s) Oral at bedtime   apixaban  aspirin  chewable  atorvastatin  clopidogrel Tablet  finasteride    Vital Signs Last 24 Hrs,    T(C): 36.6 (03 Aug 2020 07:45), Max: 36.8 (03 Aug 2020 04:42)  T(F): 97.9 (03 Aug 2020 07:45), Max: 98.2 (03 Aug 2020 04:42)  HR: 63 (03 Aug 2020 07:45) (63 - 72)  BP: 189/84 (03 Aug 2020 07:45) (127/69 - 189/84)  RR: 18 (03 Aug 2020 07:45) (18 - 18)  SpO2: 99% (03 Aug 2020 07:45) (97% - 99%)    PHYSICAL EXAM:  Constitutional: Comfortable . No acute distress.   HEENT: Atraumatic and normocephalic , neck is supple . + JVD.   CNS: A&Ox3. No focal deficits. EOMI.   Respiratory: CTAB  Cardiovascular: S1S2 RRR. 2/6 systolic murmur, no rubs or gallop.  Gastrointestinal: Soft non-tender and non distended . +Bowel sounds.  : bladder distended, palpable to umbilicus, Bladder scan as per overnight RN 700ml  Extremities: +1 edema.   Psychiatric: Calm . no agitation.  Skin: Ecchymosis RCW, R groin steri strips- dried blood, no hematoma, no active bleeding; L groin slight ecchymosis- no bleeding, no hematoma, soft.     LABS:                        10.9   7.24  )-----------( 206      ( 03 Aug 2020 05:16 )             32.2     08-03    127<L>  |  91<L>  |  20.0  ----------------------------<  99  4.1   |  25.0  |  0.81    Ca    8.4<L>      03 Aug 2020 06:47    TPro  5.9<L>  /  Alb  3.0<L>  /  TBili  0.8  /  DBili  x   /  AST  79<H>  /  ALT  72<H>  /  AlkPhos  77  08-03    CARDIAC MARKERS ( 03 Aug 2020 05:16 )  x     / 0.04 ng/mL / x     / x     / x        PT/INR - ( 03 Aug 2020 05:16 )   PT: 16.5 sec;   INR: 1.45 ratio    PTT - ( 03 Aug 2020 05:16 )  PTT:32.0 sec    INTERPRETATION OF TELEMETRY: Vpaced, afib underlying   ECG: V Paced, afib    Xray Chest 2 Views PA/Lat (07.30.20 @ 10:17)     EXAM:  XR CHEST PA LAT 2V                          PROCEDURE DATE:  07/30/2020      INTERPRETATION:  PA and lateral chest radiographs    COMPARISON:  NONE.    CLINICAL INFORMATION: Status post MICRA placement.    FINDINGS:  Micra TranscatheterPacing System cardiac  device within RIGHT ventricle.  The airway is midline.  There are no airspace consolidations.  There is no pleural effusion or pneumothorax.  The heart size is within the limits of normal. Status post aortic valve replacement.  The visualized osseus structures are intact.    IMPRESSION:    No acute radiographic cardiopulmonary pathology.    DEANA MIRZA M.D., ATTENDING RADIOLOGIST  This document has been electronically signed. Jul 30 2020 12:38PM

## 2020-08-03 NOTE — CONSULT NOTE ADULT - ATTENDING COMMENTS
Chest pain- atypical. Negative 2 sets of cardiac enzymes.   TTE.   Hold Eliquis due to hematuria.   Due to PCI 2 months prior, would not stop aspirin/plavix- considering calcified lesion.

## 2020-08-03 NOTE — ED ADULT NURSE NOTE - OBJECTIVE STATEMENT
patient d/c home from [facility] s/p TAVR on 7/31, was d/c'd home with f/c and returned to ED yesterday with complications from said f/c. f/c was removed last night and patient is now here c/o chest pain. upon assessment, patient bladder distended, patient reports peeing red blood. red urine noted in urinal.

## 2020-08-03 NOTE — H&P ADULT - NSICDXPASTMEDICALHX_GEN_ALL_CORE_FT
PAST MEDICAL HISTORY:  AF (atrial fibrillation)     Coronary artery disease status post cardiac stents June 2020    Former tobacco use quit 1984    Hematuria     HTN (hypertension)     Hyperlipidemia, mild     Moderate mitral regurgitation     Severe aortic stenosis     Sinus bradycardia

## 2020-08-03 NOTE — H&P ADULT - ASSESSMENT
88 year old male known to CT surgery s/p TAVR (7/24) and s/p [employer] Micra AV leadless PPM Implant on 7/29 now presented to ED c/o sudden onset left sided non- radiating dull chest pain for 1 hour, relieved with Asprin 325 mg. Patient was originally discharged home on 7/31 with Brock catheter for urinary retention. Patient was discharged home on 7/31 returned back to ED yesterday 8/2 with c/o leaking around the Brock with noted hematuria. Brock catheter was removed from ED and was discharged home same day after patient was passed TOV. Patient was on Eliquis at home. No bladder scan or CBC was performed prior to discharge.  On examination patient denies chest pain, palpitation, shortness of breath, dizziness, headache, vision changes, numbness, tingling, abdominal pain, N/V/D. 88 year old male known to CT surgery s/p TAVR (7/24) and s/p Medtronic Micra AV leadless PPM Implant on 7/29 now presented to ED c/o sudden onset left sided non- radiating dull chest pain for 1 hour, relieved with Asprin 325 mg. Patient was originally discharged home on 7/31 with Gonzalez catheter for urinary retention. Patient was discharged home on 7/31 returned back to ED yesterday 8/2 with c/o leaking around the Gonzalez with noted hematuria. Gonzalez catheter was removed from ED and was discharged home same day after patient was passed TOV. Patient was on Eliquis at home. No bladder scan or CBC was performed prior to discharge.  On examination patient denies chest pain, palpitation, shortness of breath, dizziness, headache, vision changes, numbness, tingling, abdominal pain, N/V/D. Urology PA evaluated >gonzalez placed with jennifer hematuria

## 2020-08-03 NOTE — H&P ADULT - NSHPPHYSICALEXAM_GEN_ALL_CORE
General: Well developed, well groomed, no acute distress  Neurology: alert and oriented x 3, nonfocal, no gross deficits  Respiratory: clear to auscultation bilaterally  CV: regular rate and rhythm, V paced, normal S1, S2. + diastolic murmur.   Abdomen: soft, nontender, nondistended, positive bowel sounds  : Gonzalez placed by Uro PA > 800 ml gross hematuria w/ clot..reports inability to pass 3 way gonzalez for CBI  Extremities: warm, well perfused. +2 BLE edema. + PP pulses bilaterally  Incisions: Right groin Steri-strips. Left groin incision, dry and intact.    Skin: Warm, well perfused

## 2020-08-03 NOTE — ED CDU PROVIDER DISPOSITION NOTE - CLINICAL COURSE
87 yo male PMHx PMHx HTN, CAD w/stents, HLD, TAVR, A-fib, pacemaker presents to ED BIBA c/o chest pain. Described sudden in onset while in bed, dull, left-sided non-radiating, lasting approx. 1 hour. Received 325mg by EMS which alleviated symptoms. Patient discharged 7/31 s/p TAVR on 7/24, discharged yesterday from [facility] ED for complications of Brock catheter. Patient on Xarelto. Upon chart review, patient with known residual disease to left circumflex s/p PCI to LAD 6/2020 and is planned to have intervention.  Brock placed by urology team.  Per CT surgery team they are requesting admission to their service, Dr. Carney accepting attending. Pt admitted, all further care transferred to accepting team.

## 2020-08-03 NOTE — ED CDU PROVIDER INITIAL DAY NOTE - OBJECTIVE STATEMENT
89 yo male PMHx PMHx HTN, CAD w/stents, HLD, TAVR, A-fib, pacemaker presents to ED BIBA c/o chest pain. Described sudden in onset while in bed, dull, left-sided non-radiating, lasting approx. 1 hour. Received 325mg by EMS which alleviated symptoms. Patient discharged 7/31 s/p TAVR on 7/24, discharged yesterday from [facility] ED for complications of Brock catheter. Patient on Xarelto. Upon chart review, patient with known residual disease to left circumflex s/p PCI to LAD 6/2020 and is planned to have intervention. No further complaints at this time.   Denies LOC, diaphoresis, SOB, abdominal pain, n/v. 87 yo male PMHx PMHx HTN, CAD w/stents, HLD, TAVR, A-fib, pacemaker presents to ED BIBA c/o chest pain. Described sudden in onset while in bed, dull, left-sided non-radiating, lasting approx. 1 hour. Received 325mg by EMS which alleviated symptoms. Patient discharged 7/31 s/p TAVR on 7/24, discharged yesterday from [facility] ED for complications of Brock catheter. Patient on Xarelto. Upon chart review, patient with known residual disease to left circumflex s/p PCI to LAD 6/2020 and is planned to have intervention. No further complaints at this time.   Denies LOC, diaphoresis, SOB, abdominal pain, n/v.  Daughter: [phone]

## 2020-08-03 NOTE — ED CDU PROVIDER INITIAL DAY NOTE - PHYSICAL EXAMINATION
General: In NAD, non-diaphoretic, non-toxic appearing; frail appearing.   Skin: No rashes or lesions. Warm, dry, pale.  Eyes: Sclera anicteric, conjunctivae clear b/l. No discharge. PERRLA, EOMI.  Cardio: No lifts, heaves, visible pulsations. No thrills. Rate and rhythm regular, S1 & S2 clear. +Murmur.   PV: No edema of feet/ankles. Pulses: b/l 2+ radial, DP, PT. Capillary refill <2 seconds.  Resp: Speaking in full sentences. Normal AP to lateral diameter, symmetrical excursion b/l. No chest wall tenderness. Breath sounds vesicular, symmetrical and without rales, rhonchi or wheezing b/l.  Abd: Non-distended. No scars, lesions, or visible pulsations. Soft, non-tender, no masses palpated. No rebound, guarding.   Neuro: A&Ox3. CN II-XII grossly intact.

## 2020-08-03 NOTE — ED PROVIDER NOTE - ATTENDING CONTRIBUTION TO CARE
I personally saw the patient with the PA, and completed the key components of the history and physical exam. I then discussed the management plan with the PA.   gen in nad resp clear cardiac + mumur abd soft neuro baseline   agree with pa plan of care

## 2020-08-03 NOTE — ED PROVIDER NOTE - OBJECTIVE STATEMENT
89 yo male PMHx PMHx HTN, CAD w/stents, HLD, TAVR, A-fib, pacemaker presents to ED BIBA c/o chest pain. Described sudden in onset while in bed, dull, left-sided non-radiating, lasting approx. 1 hour. Received 325mg by EMS. Patient discharged 7/21 s/p TAVR, discharged yesterday from [facility] ED fro complications of Brock catheter. Patient on Xarelto. No further complaints at this time.   Denies LOC, diaphoresis, SOB, abdominal pain, n/v. 87 yo male PMHx PMHx HTN, CAD w/stents, HLD, TAVR, A-fib, pacemaker presents to ED BIBA c/o chest pain. Described sudden in onset while in bed, dull, left-sided non-radiating, lasting approx. 1 hour. Received 325mg by EMS which alleviated symptoms. Patient discharged 7/21 s/p TAVR, discharged yesterday from [facility] ED for complications of Brock catheter. Patient on Xarelto. No further complaints at this time.   Denies LOC, diaphoresis, SOB, abdominal pain, n/v. 89 yo male PMHx PMHx HTN, CAD w/stents, HLD, TAVR, A-fib, pacemaker presents to ED BIBA c/o chest pain. Described sudden in onset while in bed, dull, left-sided non-radiating, lasting approx. 1 hour. Received 325mg by EMS which alleviated symptoms. Patient discharged 7/31 s/p TAVR on 7/24, discharged yesterday from [facility] ED for complications of Brock catheter. Patient on Xarelto. Upon chart review, patient with known residual disease to left circumflex s/p PCI to LAD 6/2020 and is planned to have intervention. No further complaints at this time.   Denies LOC, diaphoresis, SOB, abdominal pain, n/v.

## 2020-08-03 NOTE — H&P ADULT - NSICDXPASTSURGICALHX_GEN_ALL_CORE_FT
PAST SURGICAL HISTORY:  H/O hernia repair     S/P cataract surgery     S/P TAVR (transcatheter aortic valve replacement)

## 2020-08-03 NOTE — CONSULT NOTE ADULT - SUBJECTIVE AND OBJECTIVE BOX
[place] [facility]                                                               Office:  [address] [address]                                                              Telephone: [phone]. Fax:[phone]                                                                        CARDIOLOGY CONSULTATION NOTE                                                                                             Consult requested by:    Reason for Consultation:   History obtained by: Patient and medical record   obtained: No    Chief complaint:    Patient is a 88y old  Male who presents with a chief complaint of chest pain      HPI: 87 y/o M with PMH HTN, HLD, TAVR 7/24, Micra PPM for Afib slow RVR 7/29, CAD s/p PCI 6/5 MARE x 3 to LAD (Residual LCx 90%- awaiting staged intervention), presents to ED with c/o chest pain. Pt states pain in chest left sided, 6/10 no aggravating or alleviating factors, self limiting- 1 hour, non radiating. Also noted to have hematuria, bladder distention; seen in ED 8/2 for leakage from gonzalez catheter and hematuria, catheter removed, passed TOV discharged home. ED unable to pass catheter overnight, awaiting urology. denies shortness of breath, dyspnea, n/v/d, lightheaded, dizziness, syncope, near syncope.         REVIEW OF SYMPTOMS:     CONSTITUTIONAL: No fever, weight loss, or fatigue  ENMT:  No difficulty hearing, tinnitus, vertigo; No sinus or throat pain  NECK: No pain or stiffness  CARDIOVASCULAR: + chest pain; No dyspnea, syncope, palpitations, dizziness, Orthopnea, Paroxsymal nocturnal dyspnea  RESPIRATORY: No Dyspnea on exertion, Shortness of breath, cough, wheezing  : No dysuria, + hematuria, + urinary retention   GI: No dark color stool, no melena, no diarrhea, no constipation, + lower abdominal pain   NEURO: No headache, no dizziness, no slurred speech   MUSCULOSKELETAL: No joint pain or swelling; No muscle, back, or extremity pain  PSYCH: No agitation, no anxiety.    ALL OTHER REVIEW OF SYSTEMS ARE NEGATIVE.      PREVIOUS DIAGNOSTIC TESTING  ECHO FINDINGS:  < from: TTE Echo Complete w/ Contrast w/ Doppler (07.24.20 @ 08:19) >  PHYSICIAN INTERPRETATION:  Left Ventricle: The left ventricular internal cavity size is normal.  Global LV systolic function was normal. Left ventricular ejection fraction, by visual estimation, is 70 to 75%. The mitral in-flow pattern reveals no discernable A-wave, therefore no comment on diastolic function can be made.  Right Ventricle: The right ventricular size is mildly enlarged. RV systolic function is normal.  Left Atrium: Severely enlarged left atrium.  Right Atrium: Normal right atrial size.  Pericardium: There is no evidence of pericardial effusion.  Mitral Valve: Thickening of the anterior and posterior mitral valve leaflets. There is mild mitral annular calcification. Mild mitral valve regurgitation is seen.  Tricuspid Valve: The tricuspid valve is normal in structure. Trivial tricuspid regurgitation is visualized.  Aortic Valve: The AoV prosthesis  reported size is unknown. No evidence of aortic valve regurgitation is seen.  Pulmonic Valve: Structurally normal pulmonic valve, with normal leaflet excursion. Trace pulmonic valve regurgitation.  In comparison to the previous echocardiogram(s): Prior examinations are available and were reviewed for comparison purposes. Compared to prior study from 6/18/20, interval placement of a TAVR with preserved LV systolic function/EF.       Summary:   1. Normal global left ventricular systolic function.   2. Left ventricular ejection fraction, by visual estimation, is 70 to 75%.   3. The mitral in-flow pattern reveals no discernable A-wave, therefore no comment on diastolic function can be made.   4. Severely enlarged left atrium.   5. Mild mitral annular calcification.   6. Mildly dilated RV size and normal function, pacing wire noted.   7. Thickening of the anterior and posterior mitral valve leaflets with mild regurgitation.   8. TAVR (Marybel valve) in the aortic position. There is no clear evidence of paravalvular leak. Peak velocity of 3.0 m/s mildly elevated, mean gradient of 14 mmHg, Dimensionless index of 0.45, which suggests normal functio lindsay prosthesis.   9. There is no evidence of pericardial effusion.  10. Compared to prior study from 6/18/20, intervalplacement of a TAVR with preserved LV systolic function/EF.    Christiano Jefferson DO Electronically signed on 7/24/2020 at 1:59:31 PM       < end of copied text >      STRESS FINDINGS:      CATHETERIZATION FINDINGS:   < from: Cardiac Cath Lab - Adult (06.05.20 @ 08:25) >  VENTRICLES: Global left ventricular function was normal. EF by echo was 65  %.  CORONARY VESSELS: The coronary circulation is co-dominant.  LM:  --  LM: Angiography showed minor luminal irregularities with no flow  limiting lesions.  LAD:   --  Mid LAD: There was a tubular 95 % stenosis. The lesion was  eccentric and moderately calcified.  CX:   --  Proximal circumflex: There was a tubular 90 % stenosis. The  lesion was eccentric and moderately calcified.  --  Mid circumflex: There was a diffuse 50 % stenosis. The lesion was  irregularly contoured and heavily calcified.  RCA:   --  Ostial RCA: There was a 100 % stenosis. This lesion is a chronic  total occlusion.  COMPLICATIONS: There were no complications.  DIAGNOSTIC IMPRESSIONS: Moderate Pulmonary Hypertension. 2. Normal LV  function with LVEF of 65% on TTE from 11/2019. 3. Severe Aortic Stenosis  by TTE with a mean PG of 40mm Hgand PAOLO of 07cm2. 4. Triple vessel CAD.  5. Successful PCI to Proximal to mid LAD with ZESsx3.  DIAGNOSTIC RECOMMENDATIONS: Plavix for at least 6 months. 2. Aspirin 81mg  for 2 weeks, then to resume after Plavix is discontinued. 3. May resume  Eliquis tonite or in AM. 4. PPI or [id number] for GI protection. 5. CT  surgical evaluation for ZACARIAS. 6. Staged PCI to LCX after the ZACARIAS.  INTERVENTIONAL IMPRESSIONS: Moderate Pulmonary Hypertension. 2. Normal LV  function with LVEF of 65% on TTE from 11/2019. 3. Severe Aortic Stenosis  by TTE with a mean PG of 40mm Hg and PAOLO of 07cm2. 4. Triple vessel CAD.  5. Successful PCI to Proximal to mid LAD with ZESsx3.  INTERVENTIONAL RECOMMENDATIONS: Plavix for at least 6 months. 2. Aspirin  81mg for 2 weeks, then to resume after Plavix is discontinued. 3. May  resume Eliquis tonite or in AM. 4. PPI or [id number] for GI protection. 5.  CT surgical evaluation for ZACARIAS. 6. Staged PCI to LCX after the ZACARIAS.  Prepared and signed by  Clay Cary M.D.  Signed 06/05/2020 11:03:54    < end of copied text >        ALLERGIES: Allergies  No Known Allergies    Intolerances  Magic Cup TID (Unknown)        PAST MEDICAL HISTORY  Coronary artery disease  Sinus bradycardia  Former tobacco use  Hyperlipidemia, mild  HTN (hypertension)  Moderate mitral regurgitation  AF (atrial fibrillation)  Severe aortic stenosis  No pertinent past medical history      PAST SURGICAL HISTORY  S/P cataract surgery  H/O hernia repair      FAMILY HISTORY:  FH: heart disease (Father): mother and father both passed in 60      SOCIAL HISTORY:  Former smoking/ Denies alcohol/drugs        CURRENT MEDICATIONS:  metoprolol tartrate 12.5 milliGRAM(s) Oral two times a day  tamsulosin 0.4 milliGRAM(s) Oral at bedtime   apixaban  aspirin  chewable  atorvastatin  clopidogrel Tablet  finasteride        HOME MEDICATIONS:      Vital Signs Last 24 Hrs  T(C): 36.6 (03 Aug 2020 07:45), Max: 36.8 (03 Aug 2020 04:42)  T(F): 97.9 (03 Aug 2020 07:45), Max: 98.2 (03 Aug 2020 04:42)  HR: 63 (03 Aug 2020 07:45) (63 - 72)  BP: 189/84 (03 Aug 2020 07:45) (127/69 - 189/84)  RR: 18 (03 Aug 2020 07:45) (18 - 18)  SpO2: 99% (03 Aug 2020 07:45) (97% - 99%)      PHYSICAL EXAM:  Constitutional: Comfortable . No acute distress.   HEENT: Atraumatic and normocephalic , neck is supple . + JVD.   CNS: A&Ox3. No focal deficits. EOMI.   Respiratory: CTAB  Cardiovascular: S1S2 RRR. 2/6 systolic murmur, no rubs or gallop.  Gastrointestinal: Soft non-tender and non distended . +Bowel sounds.  : bladder distended, palpable to umbilicus, Bladder scan as per overnight RN 700ml  Extremities: +1 edema.   Psychiatric: Calm . no agitation.  Skin: Ecchymosis RCW, R groin steri strips- dried blood, no hematoma, no active bleeding; L groin slight ecchymosis- no bleeding, no hematoma, soft.         LABS:                        10.9   7.24  )-----------( 206      ( 03 Aug 2020 05:16 )             32.2     08-03    127<L>  |  91<L>  |  20.0  ----------------------------<  99  4.1   |  25.0  |  0.81    Ca    8.4<L>      03 Aug 2020 06:47    TPro  5.9<L>  /  Alb  3.0<L>  /  TBili  0.8  /  DBili  x   /  AST  79<H>  /  ALT  72<H>  /  AlkPhos  77  08-03    CARDIAC MARKERS ( 03 Aug 2020 05:16 )  x     / 0.04 ng/mL / x     / x     / x          PT/INR - ( 03 Aug 2020 05:16 )   PT: 16.5 sec;   INR: 1.45 ratio    PTT - ( 03 Aug 2020 05:16 )  PTT:32.0 sec      INTERPRETATION OF TELEMETRY: Vpaced, afib underlying   ECG: V Paced, afib [place] [facility]                                                               Office:  [address] [address]                                                              Telephone: [phone]. Fax:[phone]                                                                        CARDIOLOGY CONSULTATION NOTE                                                                                             Consult requested by:  Dr. Forde   Reason for Consultation: chest Pain   History obtained by: Patient and medical record   obtained: No    Chief complaint:    Patient is a 88y old  Male who presents with a chief complaint of chest pain      HPI: 87 y/o M with PMH HTN, HLD, TAVR 7/24, Micra PPM for Afib slow RVR 7/29, CAD s/p PCI 6/5 MARE x 3 to LAD (Residual LCx 90%- awaiting staged intervention), presents to ED with c/o chest pain. Pt states pain in chest left sided, 6/10 no aggravating or alleviating factors, self limiting- 1 hour, non radiating. Also noted to have hematuria, bladder distention; seen in ED 8/2 for leakage from gonzalez catheter and hematuria, catheter removed, passed TOV discharged home. ED unable to pass catheter overnight, awaiting urology. denies shortness of breath, dyspnea, n/v/d, lightheaded, dizziness, syncope, near syncope.         REVIEW OF SYMPTOMS:     CONSTITUTIONAL: No fever, weight loss, or fatigue  ENMT:  No difficulty hearing, tinnitus, vertigo; No sinus or throat pain  NECK: No pain or stiffness  CARDIOVASCULAR: + chest pain; No dyspnea, syncope, palpitations, dizziness, Orthopnea, Paroxsymal nocturnal dyspnea  RESPIRATORY: No Dyspnea on exertion, Shortness of breath, cough, wheezing  : No dysuria, + hematuria, + urinary retention   GI: No dark color stool, no melena, no diarrhea, no constipation, + lower abdominal pain   NEURO: No headache, no dizziness, no slurred speech   MUSCULOSKELETAL: No joint pain or swelling; No muscle, back, or extremity pain  PSYCH: No agitation, no anxiety.    ALL OTHER REVIEW OF SYSTEMS ARE NEGATIVE.      PREVIOUS DIAGNOSTIC TESTING  ECHO FINDINGS:  < from: TTE Echo Complete w/ Contrast w/ Doppler (07.24.20 @ 08:19) >  PHYSICIAN INTERPRETATION:  Left Ventricle: The left ventricular internal cavity size is normal.  Global LV systolic function was normal. Left ventricular ejection fraction, by visual estimation, is 70 to 75%. The mitral in-flow pattern reveals no discernable A-wave, therefore no comment on diastolic function can be made.  Right Ventricle: The right ventricular size is mildly enlarged. RV systolic function is normal.  Left Atrium: Severely enlarged left atrium.  Right Atrium: Normal right atrial size.  Pericardium: There is no evidence of pericardial effusion.  Mitral Valve: Thickening of the anterior and posterior mitral valve leaflets. There is mild mitral annular calcification. Mild mitral valve regurgitation is seen.  Tricuspid Valve: The tricuspid valve is normal in structure. Trivial tricuspid regurgitation is visualized.  Aortic Valve: The AoV prosthesis  reported size is unknown. No evidence of aortic valve regurgitation is seen.  Pulmonic Valve: Structurally normal pulmonic valve, with normal leaflet excursion. Trace pulmonic valve regurgitation.  In comparison to the previous echocardiogram(s): Prior examinations are available and were reviewed for comparison purposes. Compared to prior study from 6/18/20, interval placement of a TAVR with preserved LV systolic function/EF.       Summary:   1. Normal global left ventricular systolic function.   2. Left ventricular ejection fraction, by visual estimation, is 70 to 75%.   3. The mitral in-flow pattern reveals no discernable A-wave, therefore no comment on diastolic function can be made.   4. Severely enlarged left atrium.   5. Mild mitral annular calcification.   6. Mildly dilated RV size and normal function, pacing wire noted.   7. Thickening of the anterior and posterior mitral valve leaflets with mild regurgitation.   8. TAVR (Marybel valve) in the aortic position. There is no clear evidence of paravalvular leak. Peak velocity of 3.0 m/s mildly elevated, mean gradient of 14 mmHg, Dimensionless index of 0.45, which suggests normal functio lindsay prosthesis.   9. There is no evidence of pericardial effusion.  10. Compared to prior study from 6/18/20, intervalplacement of a TAVR with preserved LV systolic function/EF.    Christiano Jefferson DO Electronically signed on 7/24/2020 at 1:59:31 PM       < end of copied text >      STRESS FINDINGS:      CATHETERIZATION FINDINGS:   < from: Cardiac Cath Lab - Adult (06.05.20 @ 08:25) >  VENTRICLES: Global left ventricular function was normal. EF by echo was 65  %.  CORONARY VESSELS: The coronary circulation is co-dominant.  LM:  --  LM: Angiography showed minor luminal irregularities with no flow  limiting lesions.  LAD:   --  Mid LAD: There was a tubular 95 % stenosis. The lesion was  eccentric and moderately calcified.  CX:   --  Proximal circumflex: There was a tubular 90 % stenosis. The  lesion was eccentric and moderately calcified.  --  Mid circumflex: There was a diffuse 50 % stenosis. The lesion was  irregularly contoured and heavily calcified.  RCA:   --  Ostial RCA: There was a 100 % stenosis. This lesion is a chronic  total occlusion.  COMPLICATIONS: There were no complications.  DIAGNOSTIC IMPRESSIONS: Moderate Pulmonary Hypertension. 2. Normal LV  function with LVEF of 65% on TTE from 11/2019. 3. Severe Aortic Stenosis  by TTE with a mean PG of 40mm Hgand PAOLO of 07cm2. 4. Triple vessel CAD.  5. Successful PCI to Proximal to mid LAD with ZESsx3.  DIAGNOSTIC RECOMMENDATIONS: Plavix for at least 6 months. 2. Aspirin 81mg  for 2 weeks, then to resume after Plavix is discontinued. 3. May resume  Eliquis tonite or in AM. 4. PPI or [id number] for GI protection. 5. CT  surgical evaluation for ZACARIAS. 6. Staged PCI to LCX after the ZACARIAS.  INTERVENTIONAL IMPRESSIONS: Moderate Pulmonary Hypertension. 2. Normal LV  function with LVEF of 65% on TTE from 11/2019. 3. Severe Aortic Stenosis  by TTE with a mean PG of 40mm Hg and PAOLO of 07cm2. 4. Triple vessel CAD.  5. Successful PCI to Proximal to mid LAD with ZESsx3.  INTERVENTIONAL RECOMMENDATIONS: Plavix for at least 6 months. 2. Aspirin  81mg for 2 weeks, then to resume after Plavix is discontinued. 3. May  resume Eliquis tonite or in AM. 4. PPI or [id number] for GI protection. 5.  CT surgical evaluation for ZACARIAS. 6. Staged PCI to LCX after the ZACARIAS.  Prepared and signed by  Clay Cary M.D.  Signed 06/05/2020 11:03:54    < end of copied text >        ALLERGIES: Allergies  No Known Allergies    Intolerances  Magic Cup TID (Unknown)        PAST MEDICAL HISTORY  Coronary artery disease  Sinus bradycardia  Former tobacco use  Hyperlipidemia, mild  HTN (hypertension)  Moderate mitral regurgitation  AF (atrial fibrillation)  Severe aortic stenosis  No pertinent past medical history      PAST SURGICAL HISTORY  S/P cataract surgery  H/O hernia repair      FAMILY HISTORY:  FH: heart disease (Father): mother and father both passed in 60      SOCIAL HISTORY:  Former smoking/ Denies alcohol/drugs        CURRENT MEDICATIONS:  metoprolol tartrate 12.5 milliGRAM(s) Oral two times a day  tamsulosin 0.4 milliGRAM(s) Oral at bedtime   apixaban  aspirin  chewable  atorvastatin  clopidogrel Tablet  finasteride        HOME MEDICATIONS:      Vital Signs Last 24 Hrs  T(C): 36.6 (03 Aug 2020 07:45), Max: 36.8 (03 Aug 2020 04:42)  T(F): 97.9 (03 Aug 2020 07:45), Max: 98.2 (03 Aug 2020 04:42)  HR: 63 (03 Aug 2020 07:45) (63 - 72)  BP: 189/84 (03 Aug 2020 07:45) (127/69 - 189/84)  RR: 18 (03 Aug 2020 07:45) (18 - 18)  SpO2: 99% (03 Aug 2020 07:45) (97% - 99%)      PHYSICAL EXAM:  Constitutional: Comfortable . No acute distress.   HEENT: Atraumatic and normocephalic , neck is supple . + JVD.   CNS: A&Ox3. No focal deficits. EOMI.   Respiratory: CTAB  Cardiovascular: S1S2 RRR. 2/6 systolic murmur, no rubs or gallop.  Gastrointestinal: Soft non-tender and non distended . +Bowel sounds.  : bladder distended, palpable to umbilicus, Bladder scan as per overnight RN 700ml  Extremities: +1 edema.   Psychiatric: Calm . no agitation.  Skin: Ecchymosis RCW, R groin steri strips- dried blood, no hematoma, no active bleeding; L groin slight ecchymosis- no bleeding, no hematoma, soft.         LABS:                        10.9   7.24  )-----------( 206      ( 03 Aug 2020 05:16 )             32.2     08-03    127<L>  |  91<L>  |  20.0  ----------------------------<  99  4.1   |  25.0  |  0.81    Ca    8.4<L>      03 Aug 2020 06:47    TPro  5.9<L>  /  Alb  3.0<L>  /  TBili  0.8  /  DBili  x   /  AST  79<H>  /  ALT  72<H>  /  AlkPhos  77  08-03    CARDIAC MARKERS ( 03 Aug 2020 05:16 )  x     / 0.04 ng/mL / x     / x     / x          PT/INR - ( 03 Aug 2020 05:16 )   PT: 16.5 sec;   INR: 1.45 ratio    PTT - ( 03 Aug 2020 05:16 )  PTT:32.0 sec      INTERPRETATION OF TELEMETRY: Vpaced, afib underlying   ECG: V Paced, afib

## 2020-08-03 NOTE — H&P ADULT - HISTORY OF PRESENT ILLNESS
88 year old male patient known to CT surgery with a medical history of an MI, HTN, A-Fib, CAD, HLD, Severe AS s/p TAVR (Commercial 23mm Reeves Marybel 3 Ultra) on 7/24 via Right Common Femoral Artery required post op  s/p Medtronic Micra AV leadless PPM Implant on 7/29 now presented to ED c/o sudden onset left sided non- radiating dull chest pain for 1 hour, relieved with Asprin 325 mg. Patient was originally discharged home on 7/31 with Brock catheter for urinary retention. Patient was discharged home on 7/31 returned back to ED yesterday 8/2 with c/o leaking around the Brock with noted hematuria. Brock catheter was removed from ED and was discharged home same day after patient was passed TOV. Patient was on Eliquis at home. No bladder scan or CBC was performed prior to discharge.  On examination patient denies chest pain, palpitation, shortness of breath, dizziness, headache, vision changes, numbness, tingling, abdominal pain, N/V/D.

## 2020-08-03 NOTE — CONSULT NOTE ADULT - ASSESSMENT
89 yo male with significant cardiac hx, enlarged prostate, hematuria, urinary retention  - 18Fr coude gonzalez cath inserted at bedside, 750-800ml dark, bloody urine drained.  Pt had immediate relief.  Bladder irrigated, few small clots aspirated, urine draining well, still hematuric.  - hold eliquis and/or antiplatelets if possible  - keep gonzalez cath in place, do not remove  - cont flomax and proscar  - flush gonzalez cath with water or NS q8h  - cont care as per CTS team  - will follow

## 2020-08-03 NOTE — ED CDU PROVIDER INITIAL DAY NOTE - ATTENDING CONTRIBUTION TO CARE
I personally saw the patient with the PA, and completed the key components of the history and physical exam. I then discussed the management plan with the PA.   gen in nad resp clear cardiac + murmur abd soft nt neuro baseline   agree with pa plan of care

## 2020-08-03 NOTE — ED PROVIDER NOTE - PROGRESS NOTE DETAILS
JAIRO Vasquez: CTSx to see in ED. JAIRO Vasquez: CTSx recommending CDU for TTE. Also recommending repeat CMP, bladder scan.

## 2020-08-03 NOTE — H&P ADULT - NSHPREVIEWOFSYSTEMS_GEN_ALL_CORE
General:  Reports weakness, fatigue, Neg  fevers or chills  Skin: no itching, burning, rashes, or lesions   HEENT: no visual changes;  no headache, no vertigo, no recent colds, nasal stuffiness or sore throat   Neck: no pain, stiffness or swollen glands  Respiratory: no cough, sputum, wheezing, hemoptysis; no shortness of breath  Cardiovascular: reproducible chest pain chest pain, dyspnea or palpitations  GI: no abdominal or epigastric pain. no heartburn, nausea, vomiting, or hematemesis; no diarrhea or constipation. no melena or hematochezia  : pain with urination, frequency, unable to pass any volume since gonzalez removed in ED yesterday  Peripheral Vascular: no intermittent claudication, leg cramps, varicose veins, swelling or swelling with redness and tenderness  Neuro: no changes in orientation, memory, insight or judgment, no changes in mood, attention or speech.  no dizziness, vertigo or fainting, numbness, tingling or weakness

## 2020-08-03 NOTE — ED PROVIDER NOTE - CLINICAL SUMMARY MEDICAL DECISION MAKING FREE TEXT BOX
89 yo male PMHx PMHx HTN, CAD w/stents, HLD, TAVR (replaced 7/24) A-fib, pacemaker presents to ED BIBA c/o chest pain lasting approx. 1 hour. Received 325mg ASA by EMS. Upon chart review, patient with known residual disease to left circumflex s/p PCI to LAD 6/2020 and is planned to have intervention. EKG placed rhythm, no STEMI. CTSx consulted, to see patient in ED.

## 2020-08-04 ENCOUNTER — TRANSCRIPTION ENCOUNTER (OUTPATIENT)
Age: 85
End: 2020-08-04

## 2020-08-04 LAB
ALBUMIN SERPL ELPH-MCNC: 3.1 G/DL — LOW (ref 3.3–5.2)
ALP SERPL-CCNC: 75 U/L — SIGNIFICANT CHANGE UP (ref 40–120)
ALT FLD-CCNC: 60 U/L — HIGH
ANION GAP SERPL CALC-SCNC: 11 MMOL/L — SIGNIFICANT CHANGE UP (ref 5–17)
AST SERPL-CCNC: 61 U/L — HIGH
BILIRUB SERPL-MCNC: 0.8 MG/DL — SIGNIFICANT CHANGE UP (ref 0.4–2)
BUN SERPL-MCNC: 43 MG/DL — HIGH (ref 8–20)
CALCIUM SERPL-MCNC: 8.5 MG/DL — LOW (ref 8.6–10.2)
CHLORIDE SERPL-SCNC: 93 MMOL/L — LOW (ref 98–107)
CO2 SERPL-SCNC: 26 MMOL/L — SIGNIFICANT CHANGE UP (ref 22–29)
CREAT SERPL-MCNC: 0.76 MG/DL — SIGNIFICANT CHANGE UP (ref 0.5–1.3)
GLUCOSE SERPL-MCNC: 100 MG/DL — HIGH (ref 70–99)
HCT VFR BLD CALC: 35.8 % — LOW (ref 39–50)
HGB BLD-MCNC: 11.9 G/DL — LOW (ref 13–17)
MAGNESIUM SERPL-MCNC: 2.1 MG/DL — SIGNIFICANT CHANGE UP (ref 1.6–2.6)
MCHC RBC-ENTMCNC: 30.5 PG — SIGNIFICANT CHANGE UP (ref 27–34)
MCHC RBC-ENTMCNC: 33.2 GM/DL — SIGNIFICANT CHANGE UP (ref 32–36)
MCV RBC AUTO: 91.8 FL — SIGNIFICANT CHANGE UP (ref 80–100)
PLATELET # BLD AUTO: 257 K/UL — SIGNIFICANT CHANGE UP (ref 150–400)
POTASSIUM SERPL-MCNC: 4.9 MMOL/L — SIGNIFICANT CHANGE UP (ref 3.5–5.3)
POTASSIUM SERPL-SCNC: 4.9 MMOL/L — SIGNIFICANT CHANGE UP (ref 3.5–5.3)
PROT SERPL-MCNC: 5.8 G/DL — LOW (ref 6.6–8.7)
RBC # BLD: 3.9 M/UL — LOW (ref 4.2–5.8)
RBC # FLD: 14.7 % — HIGH (ref 10.3–14.5)
SARS-COV-2 IGG SERPL QL IA: NEGATIVE — SIGNIFICANT CHANGE UP
SARS-COV-2 IGM SERPL IA-ACNC: 0.08 INDEX — SIGNIFICANT CHANGE UP
SARS-COV-2 RNA SPEC QL NAA+PROBE: SIGNIFICANT CHANGE UP
SODIUM SERPL-SCNC: 130 MMOL/L — LOW (ref 135–145)
WBC # BLD: 8.66 K/UL — SIGNIFICANT CHANGE UP (ref 3.8–10.5)
WBC # FLD AUTO: 8.66 K/UL — SIGNIFICANT CHANGE UP (ref 3.8–10.5)

## 2020-08-04 PROCEDURE — 99233 SBSQ HOSP IP/OBS HIGH 50: CPT

## 2020-08-04 PROCEDURE — 99232 SBSQ HOSP IP/OBS MODERATE 35: CPT

## 2020-08-04 RX ADMIN — Medication 12.5 MILLIGRAM(S): at 17:43

## 2020-08-04 RX ADMIN — TAMSULOSIN HYDROCHLORIDE 0.4 MILLIGRAM(S): 0.4 CAPSULE ORAL at 22:00

## 2020-08-04 RX ADMIN — Medication 15 GRAM(S): at 17:43

## 2020-08-04 RX ADMIN — CLOPIDOGREL BISULFATE 75 MILLIGRAM(S): 75 TABLET, FILM COATED ORAL at 11:09

## 2020-08-04 RX ADMIN — Medication 15 GRAM(S): at 05:15

## 2020-08-04 RX ADMIN — ATORVASTATIN CALCIUM 80 MILLIGRAM(S): 80 TABLET, FILM COATED ORAL at 22:00

## 2020-08-04 RX ADMIN — Medication 81 MILLIGRAM(S): at 11:09

## 2020-08-04 RX ADMIN — FINASTERIDE 5 MILLIGRAM(S): 5 TABLET, FILM COATED ORAL at 11:09

## 2020-08-04 RX ADMIN — Medication 12.5 MILLIGRAM(S): at 05:14

## 2020-08-04 NOTE — PROGRESS NOTE ADULT - ASSESSMENT
87 yo male with resolved hematuria, Eliquis held, remains on ASA/plavix  - cont gonzalez cath, multiple failed TOV's  - cont proscar nd flomax  - d/c to home with gonzalez to leg bag  - plan of care discussed at length with pt's daughter on the phone at pt's bedside.  Explained that since he continues to fail TOV's he should go home with a gonzalez cath.  He should f/u with his urologist our may now f/u with Dr. Vega or Venancio.  Daughter expressed she would like to stay with [facility] docs, but will discuss further with her father.    - d/c as per primary team, f/u in 1 week

## 2020-08-04 NOTE — PROGRESS NOTE ADULT - ASSESSMENT
88 year old male known to CT surgery s/p TAVR (7/24) and s/p Medtronic Micra AV leadless PPM Implant on 7/29 now presented to ED c/o sudden onset left sided non- radiating dull chest pain for 1 hour, relieved with Asprin 325 mg. Patient was originally discharged home on 7/31 with Gonzalez catheter for urinary retention. Patient was discharged home on 7/31 returned back to ED yesterday 8/2 with c/o leaking around the Gonzalez with noted hematuria. Gonzalez catheter was removed from ED and was discharged home same day after patient was passed TOV. Patient was on Eliquis at home. No bladder scan or CBC was performed prior to discharge.  On examination patient denies chest pain, palpitation, shortness of breath, dizziness, headache, vision changes, numbness, tingling, abdominal pain, N/V/D. Urology PA evaluated >gonzalez placed with jennifer hematuria     Plan to discharge home in am if stable

## 2020-08-04 NOTE — PROGRESS NOTE ADULT - ASSESSMENT
87 y/o M with PMH HTN, HLD, TAVR 7/24, Micra PPM for Afib slow RVR 7/29, CAD s/p PCI 6/5 MARE x 3 to LAD (Residual LCx 90%- awaiting staged intervention), presents to ED with c/o chest pain. Pt states pain in chest left sided, 6/10 no aggravating or alleviating factors, self limiting- 1 hour, non radiating. Also noted to have hematuria, bladder distention; seen in ED 8/2 for leakage from gonzalez catheter and hematuria, catheter removed, passed TOV discharged home. ED unable to pass catheter overnight, awaiting urology.    8/4: patient resting comfortably. c/o same left chest discomfort, says it feels like his usual gas pains. Hematuria resolving.     Hematuria  - on eliquis- ok to hold, resume when deemed safe by urology   - resolving  - gonzalez in pace    CAD  - s/p PCI LAD 6/5  - plan for out patient staged intervention on Lcx 90%; not planned this hospitalization  - cont asa plavix    Chest pain  - trops neg x2  - EKG- V paced    Afib slow ventricular response  - hold ELiquis, resume when deemed safe by urology   - s/p MDT Micra PPM, rate controlled      No further inpatient cardiac intervention at this time.  Thank you for allowing me to participate in care of your patient.   Please call as needed. Will sign off. 89 y/o M with PMH HTN, HLD, TAVR 7/24, Micra PPM for Afib slow RVR 7/29, CAD s/p PCI 6/5 MARE x 3 to LAD (Residual LCx 90%- awaiting staged intervention), presents to ED with c/o chest pain. Pt states pain in chest left sided, 6/10 no aggravating or alleviating factors, self limiting- 1 hour, non radiating. Also noted to have hematuria, bladder distention; seen in ED 8/2 for leakage from gonzalez catheter and hematuria, catheter removed, passed TOV discharged home. ED unable to pass catheter overnight, awaiting urology.    8/4: patient resting comfortably. c/o same left chest discomfort, says it feels like his usual gas pains. Hematuria resolving.     Hematuria  - on eliquis- ok to hold, resume when deemed safe by urology   - resolving  - gonzalez in pace    CAD  - s/p PCI LAD 6/5  - plan for out patient staged intervention on Lcx 90%; not planned this hospitalization  - cont asa plavix    Chest pain  - trops neg x2  - EKG- V paced    Afib slow ventricular response  - hold ELiquis, resume when deemed safe by urology   - s/p MDT Micra PPM, rate controlled

## 2020-08-04 NOTE — PROGRESS NOTE ADULT - ATTENDING COMMENTS
Considering triple therapy in elderly patient, would consider holding eliquis for now.   Can restart as outpt.   Continue aspirin and plavix. Patient with heavily calcified artery and PCI with overlapping stents.

## 2020-08-04 NOTE — PROGRESS NOTE ADULT - ASSESSMENT
87 y/o M with PMH HTN, HLD, TAVR 7/24, Micra PPM for Afib slow RVR 7/29, CAD s/p PCI 6/5 MARE x 3 to LAD (Residual LCx 90%- awaiting staged intervention), presents to ED with c/o chest pain. Pt states pain in chest left sided, 6/10 no aggravating or alleviating factors, self limiting- 1 hour, non radiating. Also noted to have hematuria, bladder distention; seen in ED 8/2 for leakage from gonzalez catheter and hematuria, catheter removed, passed TOV discharged home. ED unable to pass catheter overnight, awaiting urology.    Hematuria - Off AC, On ASA & Plavix,     CAD  - s/p PCI LAD 6/5  - plan for out patient staged intervention on Lcx 90%;     Chest pain  - EKG- V paced    Afib slow ventricular response  - s/p MDT Micra PPM, rate controlled    Hyponatremia    Criteria for Diagnosing SIADH    Decreased effective osmolality of the extracellular fluid (Posm <275 mOsmol/kg H2O).  Inappropriate urinary concentration (Uosm >100 mOsmol/kg H2O with normal renal function) at some level of plasma hypo-osmolality.  Clinical euvolemia, as defined by the absence of signs of hypovolemia (orthostasis, tachycardia, decreased skin turgor, dry mucous  membranes) or hypervolemia (subcutaneous edema, ascites).  Elevated urinary sodium excretion (>20-30 mmol/L) while on normal salt and water intake.  Absence of other potential causes of euvolemic hypo-osmolality: severe hypothyroidism, hypocortisolism (glucocorticoid insufficiency).  Normal renal function and absence of diuretic use, particularly thiazide diuretics.  H2O ¼ water; kg ¼ kilogram; mmol ¼ millimole; mOsmol ¼ milliosmole; Posm ¼ plasma osmolality; SIADH ¼ syndrome of inappropriate antidiuretic  hormone secretion; Uosm ¼ urine osmolality.:    Rec : S/P Treatment w. 2% saline &  Lasix,     Urea, FR,     Today's Labs - P :

## 2020-08-05 ENCOUNTER — APPOINTMENT (OUTPATIENT)
Dept: CARDIOTHORACIC SURGERY | Facility: CLINIC | Age: 85
End: 2020-08-05

## 2020-08-05 DIAGNOSIS — Z95.0 PRESENCE OF CARDIAC PACEMAKER: ICD-10-CM

## 2020-08-05 DIAGNOSIS — I35.0 NONRHEUMATIC AORTIC (VALVE) STENOSIS: ICD-10-CM

## 2020-08-05 DIAGNOSIS — Z95.2 PRESENCE OF PROSTHETIC HEART VALVE: ICD-10-CM

## 2020-08-05 DIAGNOSIS — Z29.9 ENCOUNTER FOR PROPHYLACTIC MEASURES, UNSPECIFIED: ICD-10-CM

## 2020-08-05 DIAGNOSIS — I10 ESSENTIAL (PRIMARY) HYPERTENSION: ICD-10-CM

## 2020-08-05 PROBLEM — R31.9 HEMATURIA, UNSPECIFIED: Chronic | Status: ACTIVE | Noted: 2020-08-03

## 2020-08-05 LAB
ANION GAP SERPL CALC-SCNC: 10 MMOL/L — SIGNIFICANT CHANGE UP (ref 5–17)
BUN SERPL-MCNC: 38 MG/DL — HIGH (ref 8–20)
CALCIUM SERPL-MCNC: 8.6 MG/DL — SIGNIFICANT CHANGE UP (ref 8.6–10.2)
CHLORIDE SERPL-SCNC: 96 MMOL/L — LOW (ref 98–107)
CO2 SERPL-SCNC: 27 MMOL/L — SIGNIFICANT CHANGE UP (ref 22–29)
CREAT SERPL-MCNC: 0.86 MG/DL — SIGNIFICANT CHANGE UP (ref 0.5–1.3)
CULTURE RESULTS: NO GROWTH — SIGNIFICANT CHANGE UP
GLUCOSE SERPL-MCNC: 104 MG/DL — HIGH (ref 70–99)
HCT VFR BLD CALC: 38.3 % — LOW (ref 39–50)
HGB BLD-MCNC: 12.8 G/DL — LOW (ref 13–17)
MAGNESIUM SERPL-MCNC: 2.1 MG/DL — SIGNIFICANT CHANGE UP (ref 1.6–2.6)
MCHC RBC-ENTMCNC: 30.7 PG — SIGNIFICANT CHANGE UP (ref 27–34)
MCHC RBC-ENTMCNC: 33.4 GM/DL — SIGNIFICANT CHANGE UP (ref 32–36)
MCV RBC AUTO: 91.8 FL — SIGNIFICANT CHANGE UP (ref 80–100)
PLATELET # BLD AUTO: 280 K/UL — SIGNIFICANT CHANGE UP (ref 150–400)
POTASSIUM SERPL-MCNC: 4.3 MMOL/L — SIGNIFICANT CHANGE UP (ref 3.5–5.3)
POTASSIUM SERPL-SCNC: 4.3 MMOL/L — SIGNIFICANT CHANGE UP (ref 3.5–5.3)
RBC # BLD: 4.17 M/UL — LOW (ref 4.2–5.8)
RBC # FLD: 14.7 % — HIGH (ref 10.3–14.5)
SODIUM SERPL-SCNC: 133 MMOL/L — LOW (ref 135–145)
SPECIMEN SOURCE: SIGNIFICANT CHANGE UP
WBC # BLD: 8.08 K/UL — SIGNIFICANT CHANGE UP (ref 3.8–10.5)
WBC # FLD AUTO: 8.08 K/UL — SIGNIFICANT CHANGE UP (ref 3.8–10.5)

## 2020-08-05 PROCEDURE — 99233 SBSQ HOSP IP/OBS HIGH 50: CPT

## 2020-08-05 PROCEDURE — 99232 SBSQ HOSP IP/OBS MODERATE 35: CPT

## 2020-08-05 PROCEDURE — 71045 X-RAY EXAM CHEST 1 VIEW: CPT | Mod: 26

## 2020-08-05 RX ORDER — PANTOPRAZOLE SODIUM 20 MG/1
40 TABLET, DELAYED RELEASE ORAL
Refills: 0 | Status: DISCONTINUED | OUTPATIENT
Start: 2020-08-05 | End: 2020-08-05

## 2020-08-05 RX ADMIN — Medication 15 GRAM(S): at 17:26

## 2020-08-05 RX ADMIN — TAMSULOSIN HYDROCHLORIDE 0.4 MILLIGRAM(S): 0.4 CAPSULE ORAL at 21:46

## 2020-08-05 RX ADMIN — Medication 15 GRAM(S): at 05:56

## 2020-08-05 RX ADMIN — Medication 12.5 MILLIGRAM(S): at 17:27

## 2020-08-05 RX ADMIN — CLOPIDOGREL BISULFATE 75 MILLIGRAM(S): 75 TABLET, FILM COATED ORAL at 09:12

## 2020-08-05 RX ADMIN — Medication 81 MILLIGRAM(S): at 09:12

## 2020-08-05 RX ADMIN — FINASTERIDE 5 MILLIGRAM(S): 5 TABLET, FILM COATED ORAL at 09:12

## 2020-08-05 RX ADMIN — ATORVASTATIN CALCIUM 80 MILLIGRAM(S): 80 TABLET, FILM COATED ORAL at 21:46

## 2020-08-05 RX ADMIN — Medication 12.5 MILLIGRAM(S): at 05:56

## 2020-08-05 RX ADMIN — PANTOPRAZOLE SODIUM 40 MILLIGRAM(S): 20 TABLET, DELAYED RELEASE ORAL at 09:12

## 2020-08-05 NOTE — DISCHARGE NOTE PROVIDER - CARE PROVIDERS DIRECT ADDRESSES
,DirectAddress_Unknown,[email or web address],DirectAddress_Unknown

## 2020-08-05 NOTE — DISCHARGE NOTE PROVIDER - PROVIDER TOKENS
PROVIDER:[TOKEN:[[id number]]],PROVIDER:[TOKEN:[[id number]]],PROVIDER:[TOKEN:[[id number]]]

## 2020-08-05 NOTE — DISCHARGE NOTE PROVIDER - NSDCMRMEDTOKEN_GEN_ALL_CORE_FT
acetaminophen 325 mg oral tablet: 2 tab(s) orally every 6 hours, As needed, Temp greater or equal to 38.5C (101.3F)  aspirin 81 mg oral delayed release tablet: 1 tab(s) orally once a day  Eliquis 2.5 mg oral tablet: 1 tab(s) orally 2 times a day  finasteride 5 mg oral tablet: 1 tab(s) orally once a day  metoprolol tartrate 25 mg oral tablet: 0.5 tab(s) orally 2 times a day   phenylephrine 0.25%-3% rectal ointment: 1 application rectal 3 times a day, As needed, reduce swelling  Plavix 75 mg oral tablet: 1 tab(s) orally once a day   rosuvastatin 40 mg oral tablet: 1 tab(s) orally once a day  tamsulosin 0.4 mg oral capsule: 1 cap(s) orally once a day  Zetia 10 mg oral tablet: 1 tab(s) orally once a day acetaminophen 325 mg oral tablet: 2 tab(s) orally every 6 hours, As needed, Temp greater or equal to 38.5C (101.3F)  aspirin 81 mg oral delayed release tablet: 1 tab(s) orally once a day  BMP, Magnesium lab test: Please send results to Dr Lyle  [phone]  finasteride 5 mg oral tablet: 1 tab(s) orally once a day  metoprolol tartrate 25 mg oral tablet: 0.5 tab(s) orally 2 times a day   phenylephrine 0.25%-3% rectal ointment: 1 application rectal 3 times a day, As needed, reduce swelling  Plavix 75 mg oral tablet: 1 tab(s) orally once a day   rosuvastatin 40 mg oral tablet: 1 tab(s) orally once a day  tamsulosin 0.4 mg oral capsule: 1 cap(s) orally once a day  urea 15 g oral powder for reconstitution: 1 packet(s) orally 2 times a day  Zetia 10 mg oral tablet: 1 tab(s) orally once a day acetaminophen 325 mg oral tablet: 2 tab(s) orally every 6 hours, As needed, Temp greater or equal to 38.5C (101.3F)  aspirin 81 mg oral delayed release tablet: 1 tab(s) orally once a day  BMP, Magnesium lab test: Please send results to Dr Lyle  [phone]  finasteride 5 mg oral tablet: 1 tab(s) orally once a day  metoprolol tartrate 25 mg oral tablet: 0.5 tab(s) orally 2 times a day   phenylephrine 0.25%-3% rectal ointment: 1 application rectal 3 times a day, As needed, reduce swelling  Plavix 75 mg oral tablet: 1 tab(s) orally once a day   rosuvastatin 40 mg oral tablet: 1 tab(s) orally once a day  tamsulosin 0.4 mg oral capsule: 1 cap(s) orally once a day  urea 15 g oral powder for reconstitution: 1 packet(s) orally 2 times a day  Zetia 10 mg oral tablet: 1 tab(s) orally once a day

## 2020-08-05 NOTE — DISCHARGE NOTE PROVIDER - NSDCACTIVITY_GEN_ALL_CORE
Do not drive or operate machinery/No heavy lifting/straining/Stairs allowed/Walking - Indoors allowed/Showering allowed/Do not make important decisions/Walking - Outdoors allowed

## 2020-08-05 NOTE — DISCHARGE NOTE PROVIDER - HOSPITAL COURSE
Patient is a 88 year old M known to CT surgery s/p TAVR (7/24) and s/p Medtronic Micra AV leadless PPM Implant on 7/29 presented to ED c/o sudden onset left sided non- radiating dull chest pain x 1 hour, relieved with Asprin 325 mg. Patient was originally discharged home on 7/31 with Brock catheter for urinary retention. Patient returned back to ED on 8/2 with c/o leaking around the Brock with noted hematuria. Brock catheter was removed and was discharged home from ED after voiding. Patient was on Eliquis at home.  Patient retuned back to ED with chest pain in a few hours.     8/3 Urology PA evaluated Brock placed with jennifer hematuria, now resolving, stable H/H. Also noted to have hyponatremia, consulted by Nephrology, started on Urea salt tablets, now resolving.    Patient is stable for discharge home as per Dr. Carney and consultants.

## 2020-08-05 NOTE — CHART NOTE - NSCHARTNOTEFT_GEN_A_CORE
Called by RN after noticing leaking around the Brock catheter with hematuria. Assessed the patient, Brock catheter with hematuria, pink colored urine, small amount of blood with few clots noted on the bed pad. Patient denies pulling on the catheter, but stated having mild pain around the penis. Patient is asymptomatic, stable hemodynamically, Discussed with the inhouse urology ACP, recommended to flush the catheter every four hour. Minimal leak around the catheter is acceptable. The urology team will come and assess the patient in the morning.     Vital Signs Last 24 Hrs  T(C): 36.6 (05 Aug 2020 05:35), Max: 36.9 (04 Aug 2020 11:42)  T(F): 97.9 (05 Aug 2020 05:35), Max: 98.5 (04 Aug 2020 11:42)  HR: 63 (05 Aug 2020 05:35) (60 - 63)  BP: 154/80 (05 Aug 2020 05:35) (140/78 - 165/78)  RR: 18 (05 Aug 2020 05:35) (18 - 19)  SpO2: 95% (05 Aug 2020 05:35) (95% - 98%) on room air    Plan:    Flushed the Brock catheter under sterile technique, no resistance or increased leak noted  Urine color improved, currently very light pink  Will continue to monitor closely  Will repeat CBC in AM  Will follow up with the Urology recommendations  Event discussed with patient's daughter Daylin.

## 2020-08-05 NOTE — DISCHARGE NOTE PROVIDER - NSDCCPCAREPLAN_GEN_ALL_CORE_FT
PRINCIPAL DISCHARGE DIAGNOSIS  Diagnosis: Chest pain  Assessment and Plan of Treatment: Patient presented with self limiting chest pain x 1 hour  Negative Troponin x2,   Continue to take all medicines as prescribed  Follow up with cardiologist outpatient   You have to have further visits with your cardiologyst for staged intervention of your coronary artery LCx 90% stenosis.   Noted to have urinary retention, and hematuria,   Gonzalez catheter inserted by Urology  Mantain the Gonzalez until cleared by urology  Follow up with Urology outpatient in 1 week  You could follow up your urologist or with the urologist who treated you during this hospitalization (Dr. Craft/ Dr. Vega) in 1 week  Please call the office if you have retension of urine, lower abdominal pain, leaking around the Gonzalez, fever, chills.   Please return to ED if noted to have gross blood in the gonzalez catheter.        SECONDARY DISCHARGE DIAGNOSES  Diagnosis: Hyponatremia  Assessment and Plan of Treatment: Continue to take all medications as ordered  Follow up the Nephrologist outpatient      Diagnosis: HTN (hypertension)  Assessment and Plan of Treatment: Please take all your medicine as ordered  Follow up with your cardiologist and primary care doctor  Please take low salt low fat diet      Diagnosis: Cardiac pacemaker  Assessment and Plan of Treatment: Post Operative Device Instructions  - Bruising around the implant site or over the chest, side or arm near the incision is normal, and will take a few weeks to resolve.  -Do not lift the affected arm higher than 90 degrees (shoulder height) in any direction for 6 weeks.   - Do not push, pull or lift anything heavier than 10 lbs (about a gallon of milk) with the affected arm for 4 weeks.     -keep site dry for one week  - Do not touch the incision until it is completely healed.   - There are Steristrips (white strips of tape) on your incision, which will start to peel off on their own over the next 2-3 weeks. Do not pick at or peel off the Steristrips.   - Do not apply soaps, creams, lotions, ointments or powders to the incision until it is completely healed.  You should call the doctor if:   - you notice redness, drainage, swelling, increased tenderness, hot sensation around the  incision, bleeding or incision edges pulling apart.  - your temperature is greater than 100 degress F for more than 24 hours.  - you notice swelling or bulging at the incision or around the device that was not there when you left the hospital or is increasing in size.  -you experience increased difficulty breathing.  - you notice new/worsening swelling in your legs and ankles.  - you faint or have dizzy spells.  -you have any questions or concerns regarding your device or the procedure.      Diagnosis: Retention of urine  Assessment and Plan of Treatment: Please keep the Gonzalez cath as mentioned above  Please follow up with the Urologist as mentioned above      Diagnosis: S/P TAVR (transcatheter aortic valve replacement)  Assessment and Plan of Treatment: - Keep the groin site clean and dry.  You may shower 3 days after surgery, using a gentle soap over the incision and pat the site dry.  - Watch the site for signs of redness or drainage, these should be reported to your doctor immediately.  - You will receive a wallet card about your new valve in the mail.  Please carry it with you to present to anyone who may ask if you have any medical implants.  - Be sure to inform your doctors including your dentist about your valve since you will need to take antibiotics to reduce the risk of infection before certain medical and dental procedures.  - You will be given an appointment to follow up with your doctor in approximately 4 weeks.  It is important to keep this appointment so that your new valve can be assessed.  - You may resume all your normal activities as you feel comfortable doing so. Please avoid heavy lifting for 2 weeks.      Diagnosis: Hematuria  Assessment and Plan of Treatment: Same as above PRINCIPAL DISCHARGE DIAGNOSIS  Diagnosis: Chest pain  Assessment and Plan of Treatment: Continue to take all medicines as prescribed  Follow up with cardiologist outpatient   You have to have further visits with your cardiologist for staged intervention of your coronary artery LCx 90% stenosis.   Mantain the Gonzalez until cleared by urology.  Follow up with Urology outpatient in 1 week  Please call the office if you have retention of urine, lower abdominal pain, leaking around the Gonzalez, fever, chills.   Please return to ED if noted to have gross blood in the gonzalez catheter.        SECONDARY DISCHARGE DIAGNOSES  Diagnosis: Hyponatremia  Assessment and Plan of Treatment: Continue to take all medications as ordered  Follow up the Nephrologist outpatient      Diagnosis: HTN (hypertension)  Assessment and Plan of Treatment: Please take all your medicine as ordered  Follow up with your cardiologist and primary care doctor  Please take low salt low fat diet      Diagnosis: Cardiac pacemaker  Assessment and Plan of Treatment: Post Operative Device Instructions  - Bruising around the implant site or over the chest, side or arm near the incision is normal, and will take a few weeks to resolve.  -Do not lift the affected arm higher than 90 degrees (shoulder height) in any direction for 6 weeks.   - Do not push, pull or lift anything heavier than 10 lbs (about a gallon of milk) with the affected arm for 4 weeks.     -keep site dry for one week  - Do not touch the incision until it is completely healed.   - There are Steristrips (white strips of tape) on your incision, which will start to peel off on their own over the next 2-3 weeks. Do not pick at or peel off the Steristrips.   - Do not apply soaps, creams, lotions, ointments or powders to the incision until it is completely healed.  You should call the doctor if:   - you notice redness, drainage, swelling, increased tenderness, hot sensation around the  incision, bleeding or incision edges pulling apart.  - your temperature is greater than 100 degress F for more than 24 hours.  - you notice swelling or bulging at the incision or around the device that was not there when you left the hospital or is increasing in size.  -you experience increased difficulty breathing.  - you notice new/worsening swelling in your legs and ankles.  - you faint or have dizzy spells.  -you have any questions or concerns regarding your device or the procedure.      Diagnosis: Retention of urine  Assessment and Plan of Treatment: Please keep the Gonzalez cath as mentioned above  Please follow up with the Urologist as mentioned above      Diagnosis: S/P TAVR (transcatheter aortic valve replacement)  Assessment and Plan of Treatment: - Keep the groin site clean and dry.  You may shower 3 days after surgery, using a gentle soap over the incision and pat the site dry.  - Watch the site for signs of redness or drainage, these should be reported to your doctor immediately.  - You will receive a wallet card about your new valve in the mail.  Please carry it with you to present to anyone who may ask if you have any medical implants.  - Be sure to inform your doctors including your dentist about your valve since you will need to take antibiotics to reduce the risk of infection before certain medical and dental procedures.  - You will be given an appointment to follow up with your doctor in approximately 4 weeks.  It is important to keep this appointment so that your new valve can be assessed.  - You may resume all your normal activities as you feel comfortable doing so. Please avoid heavy lifting for 2 weeks.      Diagnosis: Hematuria  Assessment and Plan of Treatment: Same as above

## 2020-08-05 NOTE — PROGRESS NOTE ADULT - ASSESSMENT
88 year old male known to CT surgery s/p TAVR (7/24) and s/p Medtronic Micra AV leadless PPM Implant on 7/29 now presented to ED c/o sudden onset left sided non- radiating dull chest pain for 1 hour, relieved with Asprin 325 mg. Patient was originally discharged home on 7/31 with Gonzalez catheter for urinary retention. Patient was discharged home on 7/31 returned back to ED yesterday 8/2 with c/o leaking around the Gonzalez with noted hematuria. Gonzalez catheter was removed from ED and was discharged home same day after patient was passed TOV. Patient was on Eliquis at home. No bladder scan or CBC was performed prior to discharge.  On examination patient denies chest pain, palpitation, shortness of breath, dizziness, headache, vision changes, numbness, tingling, abdominal pain, N/V/D. Urology PA evaluated >gonzalez placed with jennifer hematuria   8/4 Hematuria resolving, urine clear  8/5 Noted to have hematuria with few blood clots around the catheter.

## 2020-08-05 NOTE — CHART NOTE - NSCHARTNOTEFT_GEN_A_CORE
88 year old male known to CT surgery s/p TAVR (7/24) and s/p Medtronic Micra AV leadless PPM Implant on 7/29 now presented to ED c/o sudden onset left sided non- radiating dull chest pain for 1 hour, relieved with Asprin 325 mg. Patient was originally discharged home on 7/31 with Gonzalez catheter for urinary retention. Patient was discharged home on 7/31 returned back to ED yesterday 8/2 with c/o leaking around the Gonzalez with noted hematuria. Gonzalez catheter was removed from ED and was discharged home same day after patient was passed TOV. Patient was on Eliquis at home. No bladder scan or CBC was performed prior to discharge.  On examination patient denies chest pain, palpitation, shortness of breath, dizziness, headache, vision changes, numbness, tingling, abdominal pain, N/V/D. Urology PA evaluated >gonzalez placed with jennifer hematuria   8/4 Hematuria resolving, urine clear  8/5 Noted to have hematuria with few blood clots around the catheter.    Plan:  Slight hematuria noted currently.  No clots or leaking around the gonzalez catheter.  Discussed with urology PA at bedside.  Will follow up again this afternoon. 88 year old male known to CT surgery s/p TAVR (7/24) and s/p Medtronic Micra AV leadless PPM Implant on 7/29 now presented to ED c/o sudden onset left sided non- radiating dull chest pain for 1 hour, relieved with Asprin 325 mg. Patient was originally discharged home on 7/31 with Gonzalez catheter for urinary retention. Patient was discharged home on 7/31 returned back to ED yesterday 8/2 with c/o leaking around the Gonzalez with noted hematuria. Gonzalez catheter was removed from ED and was discharged home same day after patient was passed TOV. Patient was on Eliquis at home. No bladder scan or CBC was performed prior to discharge.  On examination patient denies chest pain, palpitation, shortness of breath, dizziness, headache, vision changes, numbness, tingling, abdominal pain, N/V/D. Urology PA evaluated >gonzalez placed with jennifer hematuria   8/4 Hematuria resolving, urine clear  8/5 Noted to have hematuria with few blood clots around the catheter.    Plan:  Slight hematuria noted currently.  No clots or leaking around the gonzalez catheter.  Discussed with urology PA at bedside.  Will follow up again this afternoon.  Dr. Carney discussed case with Dr. Aranda.  Unable to hold dual antiplatelet therapy with recent PCI .  Will continue to hold Eliquis until urologic issues resolves.

## 2020-08-05 NOTE — CHART NOTE - NSCHARTNOTEFT_GEN_A_CORE
Pt seen and examined again at bedside  Resting comfortably, no complaints  Urine now yellow and clear  If urine remains yellow, pt may be d/c'd home with leg bag  Pt to follow up next week with urology in office

## 2020-08-05 NOTE — DISCHARGE NOTE PROVIDER - NSDCFUSCHEDAPPT_GEN_ALL_CORE_FT
CLARISSE GUTIERRES ; 08/05/2020 ; NPP CT Surg [address] [facility] CLARISSE GUTIERRES ; 08/18/2020 ; NPP Cardio Electro [address] [place] [address] CLARISSE GUTIERRES ; 08/18/2020 ; NPP Cardio Electro [address] [place] [address]

## 2020-08-05 NOTE — PROGRESS NOTE ADULT - ASSESSMENT
89 yo male pt with multiple medical and cardiac issues, recent TAVR, urinary retention, hematuria  - no clots in bladder, cont gonzalez cath  - cont proscar and flomax  - will reassess urine output and color, for possible d/c planning  - oob to chair/ambulate as tolerated

## 2020-08-05 NOTE — DISCHARGE NOTE PROVIDER - NSDCFUADDINST_GEN_ALL_CORE_FT
Please call the Cardiothoracic Surgery office at [phone] if you are experiencing any shortness of breath, chest pain, fevers or chills, drainage from the incisions, persistent nausea, vomiting or if you have any questions about your medications. If the symptoms are severe, call 911 and go to the nearest hospital. You can also call [phone] for an emergency [facility] ambulance, which will take you to the closest [facility].    If you need any assistance for making any appointments for a new consult or referral in any specialty, please call our [facility] Clinical Coordination Center at [phone].    Your Care Navigator Nurse Practitioner will be in touch to see you in your home within a few days from discharge. The Follow Your Heart program can help ensure you understand your medications, discharge instructions and answer any questions you may have at that time. They are also a great source to address concerns during the day and may be reached at [phone].

## 2020-08-06 ENCOUNTER — TRANSCRIPTION ENCOUNTER (OUTPATIENT)
Age: 85
End: 2020-08-06

## 2020-08-06 VITALS
TEMPERATURE: 98 F | DIASTOLIC BLOOD PRESSURE: 61 MMHG | RESPIRATION RATE: 18 BRPM | OXYGEN SATURATION: 97 % | SYSTOLIC BLOOD PRESSURE: 106 MMHG | HEART RATE: 61 BPM

## 2020-08-06 LAB
ALBUMIN SERPL ELPH-MCNC: 2.9 G/DL — LOW (ref 3.3–5.2)
ALP SERPL-CCNC: 79 U/L — SIGNIFICANT CHANGE UP (ref 40–120)
ALT FLD-CCNC: 51 U/L — HIGH
ANION GAP SERPL CALC-SCNC: 10 MMOL/L — SIGNIFICANT CHANGE UP (ref 5–17)
AST SERPL-CCNC: 43 U/L — HIGH
BILIRUB SERPL-MCNC: 0.6 MG/DL — SIGNIFICANT CHANGE UP (ref 0.4–2)
BUN SERPL-MCNC: 49 MG/DL — HIGH (ref 8–20)
CALCIUM SERPL-MCNC: 8.8 MG/DL — SIGNIFICANT CHANGE UP (ref 8.6–10.2)
CHLORIDE SERPL-SCNC: 95 MMOL/L — LOW (ref 98–107)
CO2 SERPL-SCNC: 29 MMOL/L — SIGNIFICANT CHANGE UP (ref 22–29)
CREAT SERPL-MCNC: 0.91 MG/DL — SIGNIFICANT CHANGE UP (ref 0.5–1.3)
GLUCOSE SERPL-MCNC: 104 MG/DL — HIGH (ref 70–99)
HCT VFR BLD CALC: 37.5 % — LOW (ref 39–50)
HGB BLD-MCNC: 12.2 G/DL — LOW (ref 13–17)
MAGNESIUM SERPL-MCNC: 2.1 MG/DL — SIGNIFICANT CHANGE UP (ref 1.6–2.6)
MCHC RBC-ENTMCNC: 30.3 PG — SIGNIFICANT CHANGE UP (ref 27–34)
MCHC RBC-ENTMCNC: 32.5 GM/DL — SIGNIFICANT CHANGE UP (ref 32–36)
MCV RBC AUTO: 93.1 FL — SIGNIFICANT CHANGE UP (ref 80–100)
PHOSPHATE SERPL-MCNC: 2.9 MG/DL — SIGNIFICANT CHANGE UP (ref 2.4–4.7)
PLATELET # BLD AUTO: 318 K/UL — SIGNIFICANT CHANGE UP (ref 150–400)
POTASSIUM SERPL-MCNC: 4.7 MMOL/L — SIGNIFICANT CHANGE UP (ref 3.5–5.3)
POTASSIUM SERPL-SCNC: 4.7 MMOL/L — SIGNIFICANT CHANGE UP (ref 3.5–5.3)
PROT SERPL-MCNC: 6.1 G/DL — LOW (ref 6.6–8.7)
RBC # BLD: 4.03 M/UL — LOW (ref 4.2–5.8)
RBC # FLD: 15 % — HIGH (ref 10.3–14.5)
SODIUM SERPL-SCNC: 134 MMOL/L — LOW (ref 135–145)
WBC # BLD: 9.7 K/UL — SIGNIFICANT CHANGE UP (ref 3.8–10.5)
WBC # FLD AUTO: 9.7 K/UL — SIGNIFICANT CHANGE UP (ref 3.8–10.5)

## 2020-08-06 PROCEDURE — 80053 COMPREHEN METABOLIC PANEL: CPT

## 2020-08-06 PROCEDURE — 84100 ASSAY OF PHOSPHORUS: CPT

## 2020-08-06 PROCEDURE — 93005 ELECTROCARDIOGRAM TRACING: CPT

## 2020-08-06 PROCEDURE — 83935 ASSAY OF URINE OSMOLALITY: CPT

## 2020-08-06 PROCEDURE — 85610 PROTHROMBIN TIME: CPT

## 2020-08-06 PROCEDURE — 93306 TTE W/DOPPLER COMPLETE: CPT

## 2020-08-06 PROCEDURE — 86901 BLOOD TYPING SEROLOGIC RH(D): CPT

## 2020-08-06 PROCEDURE — 84133 ASSAY OF URINE POTASSIUM: CPT

## 2020-08-06 PROCEDURE — 85027 COMPLETE CBC AUTOMATED: CPT

## 2020-08-06 PROCEDURE — 86900 BLOOD TYPING SEROLOGIC ABO: CPT

## 2020-08-06 PROCEDURE — 85730 THROMBOPLASTIN TIME PARTIAL: CPT

## 2020-08-06 PROCEDURE — 86850 RBC ANTIBODY SCREEN: CPT

## 2020-08-06 PROCEDURE — 99238 HOSP IP/OBS DSCHRG MGMT 30/<: CPT

## 2020-08-06 PROCEDURE — G0378: CPT

## 2020-08-06 PROCEDURE — 71045 X-RAY EXAM CHEST 1 VIEW: CPT

## 2020-08-06 PROCEDURE — 87086 URINE CULTURE/COLONY COUNT: CPT

## 2020-08-06 PROCEDURE — 81001 URINALYSIS AUTO W/SCOPE: CPT

## 2020-08-06 PROCEDURE — 99285 EMERGENCY DEPT VISIT HI MDM: CPT | Mod: 25

## 2020-08-06 PROCEDURE — 36415 COLL VENOUS BLD VENIPUNCTURE: CPT

## 2020-08-06 PROCEDURE — 99282 EMERGENCY DEPT VISIT SF MDM: CPT

## 2020-08-06 PROCEDURE — 51702 INSERT TEMP BLADDER CATH: CPT

## 2020-08-06 PROCEDURE — U0003: CPT

## 2020-08-06 PROCEDURE — 84484 ASSAY OF TROPONIN QUANT: CPT

## 2020-08-06 PROCEDURE — 84300 ASSAY OF URINE SODIUM: CPT

## 2020-08-06 PROCEDURE — 80048 BASIC METABOLIC PNL TOTAL CA: CPT

## 2020-08-06 PROCEDURE — 86769 SARS-COV-2 COVID-19 ANTIBODY: CPT

## 2020-08-06 PROCEDURE — 83735 ASSAY OF MAGNESIUM: CPT

## 2020-08-06 RX ORDER — APIXABAN 2.5 MG/1
1 TABLET, FILM COATED ORAL
Qty: 0 | Refills: 0 | DISCHARGE

## 2020-08-06 RX ORDER — UREA 15 G
1 POWDER IN PACKET (EA) ORAL
Qty: 14 | Refills: 0
Start: 2020-08-06 | End: 2020-08-12

## 2020-08-06 RX ADMIN — Medication 12.5 MILLIGRAM(S): at 05:20

## 2020-08-06 RX ADMIN — PANTOPRAZOLE SODIUM 40 MILLIGRAM(S): 20 TABLET, DELAYED RELEASE ORAL at 05:20

## 2020-08-06 RX ADMIN — Medication 81 MILLIGRAM(S): at 11:56

## 2020-08-06 RX ADMIN — FINASTERIDE 5 MILLIGRAM(S): 5 TABLET, FILM COATED ORAL at 11:56

## 2020-08-06 RX ADMIN — CLOPIDOGREL BISULFATE 75 MILLIGRAM(S): 75 TABLET, FILM COATED ORAL at 11:56

## 2020-08-06 RX ADMIN — Medication 15 GRAM(S): at 05:20

## 2020-08-06 NOTE — PROGRESS NOTE ADULT - SUBJECTIVE AND OBJECTIVE BOX
[facility]                                                               Office: [address] [address]                                                              Telephone: [phone]. Fax:[phone]                                                                             PROGRESS NOTE  Reason for follow up: Chest pain  Overnight: No new events.   Update: 8/4: patient resting comfortably. c/o same left chest discomfort, says it feels like his usual gas pains. Hematuria resolving.       Review of symptoms:   Cardiac:  No chest pain. No dyspnea. No palpitations.  Respiratory: No cough. No dyspnea  Gastrointestinal: No diarrhea. No abdominal pain. No bleeding.     Past medical history: No updates.   	  Vitals:  T(C): 36.9 (08-04-20 @ 15:56), Max: 37.1 (08-03-20 @ 17:13)  HR: 62 (08-04-20 @ 15:56) (60 - 67)  BP: 150/83 (08-04-20 @ 15:56) (137/63 - 165/78)  RR: 18 (08-04-20 @ 15:56) (18 - 19)  SpO2: 97% (08-04-20 @ 15:56) (96% - 98%)  Wt(kg): --  I&O's Summary    03 Aug 2020 07:01  -  04 Aug 2020 07:00  --------------------------------------------------------  IN: 0 mL / OUT: 4150 mL / NET: -4150 mL      Weight (kg): 54.4 (08-02 @ 19:41)      PHYSICAL EXAM:  Appearance: Comfortable. No acute distress  HEENT:  Head and neck: Atraumatic. Normocephalic.  Normal oral mucosa, PERRL, Neck is supple. No JVD, No carotid bruit.   Neurologic: A&Ox 3, no focal deficits. EOMI, Cranial nerves are intact.  Lymphatic: No cervical lymphadenopathy  Cardiovascular: Normal S1 S2, No murmur, rubs/gallops. No JVD, No edema  Respiratory: Lungs clear to auscultation  Gastrointestinal:  Soft, Non-tender, + BS  Lower Extremities: No edema  Psychiatry: Patient is calm. No agitation. Mood & affect appropriate  Skin: No rashes/ecchymoses/cyanosis/ulcers visualized on the face, hands or feet.      CURRENT MEDICATIONS:  metoprolol tartrate 12.5 milliGRAM(s) Oral two times a day  tamsulosin 0.4 milliGRAM(s) Oral at bedtime  urea Oral Powder 15 Gram(s) Oral two times a day    atorvastatin  finasteride  aspirin enteric coated  clopidogrel Tablet      DIAGNOSTIC TESTING:  [ ] Echocardiogram:   < from: TTE Echo Complete w/o Contrast w/ Doppler (08.03.20 @ 11:10) >  Summary:   1. Left ventricular ejection fraction, by visual estimation, is >75%.   2. Hyperdynamic global left ventricular systolic function.   3. There is no evidence of pericardial effusion.   4. Mild thickening of the anterior and posterior mitral valve leaflets.   5. Mild to moderate mitral valve regurgitation.   6. Moderate mitral annular calcification.   7. Moderate tricuspid regurgitation.   8. TAVR (Marybel valve) seen in aortic position.   9. Estimated pulmonary artery systolic pressure is 37.1 mmHg assuming a right atrial pressure of 3 mmHg, which is consistent with borderline pulmonary hypertension.    MD Jeremiah Electronically signed on 8/3/2020 at 9:59:38 PM      < end of copied text >    [ ]  Catheterization:  < from: Cardiac Cath Lab - Adult (07.24.20 @ 07:24) >  DIAGNOSTIC IMPRESSIONS: Successful deployment of a 23mm Reeves-Marybel S3  Ultra via a Right Femoral cut-down approach. 2. Successful implantation of  a semi-perm PM via R SCV. 3. Suceesful deployment of a 6Fr Angio-Seal to L  CFA. 4. Please refer to dr. Carney's Operative Report for surgical  details.  DIAGNOSTIC RECOMMENDATIONS: Avoid negative chronotropy for 24-36 hrs. 2.  Plavix for at least 6 months. 3. Resumption of OAC per CT surgical team.  4. TTE for obtaining data for submission to TAVR registry.  INTERVENTIONAL IMPRESSIONS: Successful deployment of a 23mm Reeves-Marybel  S3 Ultra via a Right Femoral cut-down approach. 2. Successful implantation  of a semi-perm PM via R SCV. 3. Suceesful deployment of a 6Fr Angio-Seal  to L CFA. 4. Please refer to dr. Carney's Operative Report for surgical  details.  INTERVENTIONAL RECOMMENDATIONS: Avoid negative chronotropy for 24-36 hrs.  2. Plavix for at least 6 months. 3. Resumption of OAC per CT surgical  team. 4. TTE for obtaining data for submission to TAVR registry.  Prepared and signed by  Clay Cary M.D.  Signed 07/24/2020 09:35:15    < end of copied text >    < from: Cardiac Cath Lab - Adult (06.05.20 @ 08:25) >  VENTRICLES: Global left ventricular function was normal. EF by echo was 65  %.  CORONARY VESSELS: The coronary circulation is co-dominant.  LM:  --  LM: Angiography showed minor luminal irregularities with no flow  limiting lesions.  LAD:   --  Mid LAD: There was a tubular 95 % stenosis. The lesion was  eccentric and moderately calcified.  CX:   --  Proximal circumflex: There was a tubular 90 % stenosis. The  lesion was eccentric and moderately calcified.  --  Mid circumflex: There was a diffuse 50 % stenosis. The lesion was  irregularly contoured and heavily calcified.  RCA:   --  Ostial RCA: There was a 100 % stenosis. This lesion is a chronic  total occlusion.  COMPLICATIONS: There were no complications.  DIAGNOSTIC IMPRESSIONS: Moderate Pulmonary Hypertension. 2. Normal LV  function with LVEF of 65% on TTE from 11/2019. 3. Severe Aortic Stenosis  by TTE with a mean PG of 40mm Hgand PAOLO of 07cm2. 4. Triple vessel CAD.  5. Successful PCI to Proximal to mid LAD with ZESsx3.  DIAGNOSTIC RECOMMENDATIONS: Plavix for at least 6 months. 2. Aspirin 81mg  for 2 weeks, then to resume after Plavix is discontinued. 3. May resume  Eliquis tonite or in AM. 4. PPI or [id number] for GI protection. 5. CT  surgical evaluation for ZACARIAS. 6. Staged PCI to LCX after the ZACARIAS.  INTERVENTIONAL IMPRESSIONS: Moderate Pulmonary Hypertension. 2. Normal LV  function with LVEF of 65% on TTE from 11/2019. 3. Severe Aortic Stenosis  by TTE with a mean PG of 40mm Hg and PAOLO of 07cm2. 4. Triple vessel CAD.  5. Successful PCI to Proximal to mid LAD with ZESsx3.  INTERVENTIONAL RECOMMENDATIONS: Plavix for at least 6 months. 2. Aspirin  81mg for 2 weeks, then to resume after Plavix is discontinued. 3. May  resume Eliquis tonite or in AM. 4. PPI or [id number] for GI protection. 5.  CT surgical evaluation for ZACARIAS. 6. Staged PCI to LCX after the ZACARIAS.  Prepared and signed by  Clay Cary M.D.  Signed 06/05/2020 11:03:54    < end of copied text >    [ ] Stress Test:    OTHER: 	      LABS:	 	  CARDIAC MARKERS ( 03 Aug 2020 09:34 )  x     / 0.03 ng/mL / x     / x     / x      p-BNP 03 Aug 2020 09:34: x    , CARDIAC MARKERS ( 03 Aug 2020 05:16 )  x     / 0.04 ng/mL / x     / x     / x      p-BNP 03 Aug 2020 05:16: x                              11.9   8.66  )-----------( 257      ( 04 Aug 2020 08:36 )             35.8     08-04    130<L>  |  93<L>  |  43.0<H>  ----------------------------<  100<H>  4.9   |  26.0  |  0.76    Ca    8.5<L>      04 Aug 2020 08:36  Mg     2.1     08-04    TPro  5.8<L>  /  Alb  3.1<L>  /  TBili  0.8  /  DBili  x   /  AST  61<H>  /  ALT  60<H>  /  AlkPhos  75  08-04    proBNP:   Lipid Profile:   HgA1c:   TSH:       TELEMETRY: v-paced 50-60s, underlying AFlutter
[facility] DIVISION OF KIDNEY DISEASES AND HYPERTENSION -- FOLLOW UP NOTE  --------------------------------------------------------------------------------  Chief Complaint: Alert,     24 hour events/subjective:    PAST HISTORY  --------------------------------------------------------------------------------  No significant changes to PMH, PSH, FHx, SHx, unless otherwise noted    ALLERGIES & MEDICATIONS  --------------------------------------------------------------------------------  Allergies    No Known Allergies    Intolerances    Magic Cup TID (Unknown)    Standing Inpatient Medications  aspirin enteric coated 81 milliGRAM(s) Oral daily  atorvastatin 80 milliGRAM(s) Oral at bedtime  clopidogrel Tablet 75 milliGRAM(s) Oral daily  finasteride 5 milliGRAM(s) Oral daily  metoprolol tartrate 12.5 milliGRAM(s) Oral two times a day  tamsulosin 0.4 milliGRAM(s) Oral at bedtime  urea Oral Powder 15 Gram(s) Oral two times a day    PRN Inpatient Medications  acetaminophen   Tablet .. 650 milliGRAM(s) Oral every 6 hours PRN  hemorrhoidal Ointment 1 Application(s) Rectal three times a day PRN    REVIEW OF SYSTEMS  --------------------------------------------------------------------------------  Gen: No weight changes, fatigue, fevers/chills, weakness  Skin: No rashes  Head/Eyes/Ears/Mouth: No headache; Normal hearing; Normal vision w/o blurriness; No sinus pain/discomfort, sore throat  Respiratory: No dyspnea, cough, wheezing, hemoptysis  CV: No chest pain, PND, orthopnea  GI: No abdominal pain, diarrhea, constipation, nausea, vomiting, melena, hematochezia  : No increased frequency, dysuria, hematuria, nocturia  MSK: No joint pain/swelling; no back pain; no edema  Neuro: No dizziness/lightheadedness, weakness, seizures, numbness, tingling  Heme: No easy bruising or bleeding  Endo: No heat/cold intolerance  Psych: No significant nervousness, anxiety, stress, depression    All other systems were reviewed and are negative, except as noted.    VITALS/PHYSICAL EXAM  --------------------------------------------------------------------------------  T(C): 36.3 (08-04-20 @ 07:37), Max: 37.1 (08-03-20 @ 17:13)  HR: 60 (08-04-20 @ 07:37) (60 - 67)  BP: 140/78 (08-04-20 @ 07:37) (137/63 - 164/72)  RR: 18 (08-04-20 @ 07:37) (18 - 19)  SpO2: 98% (08-04-20 @ 07:37) (96% - 99%)  Height (cm): 162.56 (08-03-20 @ 04:42)  Weight (kg): 54.4 (08-02-20 @ 19:41)  BMI (kg/m2): 20.6 (08-03-20 @ 04:42)  BSA (m2): 1.57 (08-03-20 @ 04:42)    08-03-20 @ 07:01  -  08-04-20 @ 07:00  --------------------------------------------------------  IN: 0 mL / OUT: 4150 mL / NET: -4150 mL    Physical Exam:  	Gen: NAD, well-appearing  	HEENT: PERRL, supple neck,   	Pulm: CTA B/L  	CV: RRR, S1S2; no rub  	Back: No spinal or CVA tenderness; no sacral edema  	Abd: +BS, soft, nontender/nondistended  	: No suprapubic tenderness  	UE: Warm, FROM, no clubbing, intact strength; no edema; no asterixis  	LE: Warm, FROM, no clubbing, intact strength; no edema  	Neuro: No focal deficits,  	Psych: Normal affect and mood  	Skin: Warm, without rashes  	Vascular access: NA,     LABS/STUDIES  --------------------------------------------------------------------------------              11.9   8.66  >-----------<  257      [08-04-20 @ 08:36]              35.8     127  |  91  |  20.0  ----------------------------<  99      [08-03-20 @ 06:47]  4.1   |  25.0  |  0.81        Ca     8.4     [08-03-20 @ 06:47]    TPro  5.9  /  Alb  3.0  /  TBili  0.8  /  DBili  x   /  AST  79  /  ALT  72  /  AlkPhos  77  [08-03-20 @ 06:47]    PT/INR: PT 16.5 , INR 1.45       [08-03-20 @ 05:16]  PTT: 32.0       [08-03-20 @ 05:16]    Troponin 0.03      [08-03-20 @ 09:34]    Creatinine Trend:  SCr 0.81 [08-03 @ 06:47]  SCr 0.85 [08-03 @ 05:16]  SCr 0.78 [07-31 @ 07:10]  SCr 0.88 [07-30 @ 04:59]  SCr 0.93 [07-29 @ 02:37]    Urinalysis - [08-03-20 @ 14:51]      Color Red / Appearance bloody / SG 1.010 / pH 8.0      Gluc Negative / Ketone Small  / Bili Negative / Urobili Negative       Blood Large / Protein 500 / Leuk Est Trace / Nitrite Negative      RBC TNTC / WBC 6-10 / Hyaline  / Gran  / Sq Epi  / Non Sq Epi Occasional / Bacteria Negative    Urine Sodium 49      [08-03-20 @ 22:48]  Urine Potassium 19      [08-03-20 @ 22:48]  Urine Osmolality 244      [08-03-20 @ 22:48]    TSH 2.28      [07-15-20 @ 13:29]
Subjective:  Pt in bed NAD.  Hematuria resolving.  Urology following     VITAL SIGNS  T(C): 36.9 (08-04-20 @ 15:56), Max: 36.9 (08-03-20 @ 23:49)  HR: 62 (08-04-20 @ 15:56) (60 - 62)  BP: 150/83 (08-04-20 @ 15:56) (140/78 - 165/78)  RR: 18 (08-04-20 @ 15:56) (18 - 18)  SpO2: 97% (08-04-20 @ 15:56) (96% - 98%) RA           Daily     Daily   Admit Wt: Drug Dosing Weight  Height (cm): 162.56 (03 Aug 2020 04:42)  Weight (kg): 54.4 (02 Aug 2020 19:41)    Telemetry: Afib    LVEF:  75%    MEDICATIONS  acetaminophen   Tablet .. 650 milliGRAM(s) Oral every 6 hours PRN  aspirin enteric coated 81 milliGRAM(s) Oral daily  atorvastatin 80 milliGRAM(s) Oral at bedtime  clopidogrel Tablet 75 milliGRAM(s) Oral daily  finasteride 5 milliGRAM(s) Oral daily  hemorrhoidal Ointment 1 Application(s) Rectal three times a day PRN  metoprolol tartrate 12.5 milliGRAM(s) Oral two times a day  tamsulosin 0.4 milliGRAM(s) Oral at bedtime  urea Oral Powder 15 Gram(s) Oral two times a day    MEDICATIONS  (PRN):  acetaminophen   Tablet .. 650 milliGRAM(s) Oral every 6 hours PRN Temp greater or equal to 38C (100.4F), Mild Pain (1 - 3)  hemorrhoidal Ointment 1 Application(s) Rectal three times a day PRN reduce swelling        PHYSICAL EXAM  General: Alert Awake   Respiratory:  Decreased at bases , clear b/l   CV: Irreg rate S1 S2 Mild YUMIKO  Abdomen:  Soft NT ND + BS   Extremities:   warm, well perfused. +2 BLE edema. + PP pulses bilaterally  Incisions: Incisions: Right groin Steri-strips. Left groin incision, dry and intact.  + Brock  clear urine with scant small clots noted in tube            I&O's Detail    03 Aug 2020 07:01  -  04 Aug 2020 07:00  --------------------------------------------------------  IN:  Total IN: 0 mL    OUT:    Estimated Blood Loss: 1600 mL    Indwelling Catheter - Urethral: 2050 mL    Voided: 500 mL  Total OUT: 4150 mL    Total NET: -4150 mL          LABS  08-04    130<L>  |  93<L>  |  43.0<H>  ----------------------------<  100<H>  4.9   |  26.0  |  0.76    Ca    8.5<L>      04 Aug 2020 08:36  Mg     2.1     08-04    TPro  5.8<L>  /  Alb  3.1<L>  /  TBili  0.8  /  DBili  x   /  AST  61<H>  /  ALT  60<H>  /  AlkPhos  75  08-04                                 11.9   8.66  )-----------( 257      ( 04 Aug 2020 08:36 )             35.8          PT/INR - ( 03 Aug 2020 05:16 )   PT: 16.5 sec;   INR: 1.45 ratio         PTT - ( 03 Aug 2020 05:16 )  PTT:32.0 sec      LIVER FUNCTIONS - ( 04 Aug 2020 08:36 )  Alb: 3.1 g/dL / Pro: 5.8 g/dL / ALK PHOS: 75 U/L / ALT: 60 U/L / AST: 61 U/L / GGT: x              CAPILLARY BLOOD GLUCOSE                CXR: < from: Xray Chest 1 View AP/PA. (08.03.20 @ 05:14) >  Comment:  Frontal view of the chest was obtained and compared to the prior study dated 7/30/2020. There is a TAVR. Micra Pacer device is noted overlying the cardiac silhouette. The cardiac silhouette is normal.  The lungs are clear without infiltrate, effusion, or pneumothorax. The osseous structures are significant for thoracic dextro scoliosis with degenerative changes. There are healed upper fractures.      Impression:  1. TAVR. Micra pacer device  2. Clear lungs. Unchanged    < end of copied text >      PAST MEDICAL & SURGICAL HISTORY:  Hematuria  Coronary artery disease: status post cardiac stents June 2020  Sinus bradycardia  Former tobacco use: quit 1984  Hyperlipidemia, mild  HTN (hypertension)  Moderate mitral regurgitation  AF (atrial fibrillation)  Severe aortic stenosis  S/P TAVR (transcatheter aortic valve replacement)  S/P cataract surgery  H/O hernia repair
Subjective: no c/o incisional pain at this time. Denies CP, SOB, palpitations, N/V, other c/o.    T(C): 36.9 (08-05-20 @ 21:25), Max: 36.9 (08-05-20 @ 21:25)  HR: 60 (08-05-20 @ 21:25) (60 - 65)  BP: 111/64 (08-05-20 @ 21:25) (111/64 - 154/80)  ABP: --  ABP(mean): --  RR: 16 (08-05-20 @ 21:25) (16 - 18)  SpO2: 98% (08-05-20 @ 21:25) (95% - 98%)  Wt(kg): --  CVP(mm Hg): --  CO: --  CI: --  PA: --       I&O's Detail    04 Aug 2020 07:01  -  05 Aug 2020 07:00  --------------------------------------------------------  IN:    Oral Fluid: 360 mL  Total IN: 360 mL    OUT:    Indwelling Catheter - Urethral: 2200 mL  Total OUT: 2200 mL    Total NET: -1840 mL      05 Aug 2020 07:01  -  06 Aug 2020 00:29  --------------------------------------------------------  IN:    Oral Fluid: 640 mL  Total IN: 640 mL    OUT:    Indwelling Catheter - Urethral: 850 mL  Total OUT: 850 mL    Total NET: -210 mL          LABS: All Lab data reviewed and analyzed                        12.8   8.08  )-----------( 280      ( 05 Aug 2020 06:12 )             38.3     08-05    133<L>  |  96<L>  |  38.0<H>  ----------------------------<  104<H>  4.3   |  27.0  |  0.86    Ca    8.6      05 Aug 2020 06:12  Mg     2.1     08-05    TPro  5.8<L>  /  Alb  3.1<L>  /  TBili  0.8  /  DBili  x   /  AST  61<H>  /  ALT  60<H>  /  AlkPhos  75  08-04            CAPILLARY BLOOD GLUCOSE               RADIOLOGY: - Reviewed and analyzed   CXR: pending    HOSPITAL MEDICATIONS: All medications reviewed and analyzed  MEDICATIONS  (STANDING):  aspirin enteric coated 81 milliGRAM(s) Oral daily  atorvastatin 80 milliGRAM(s) Oral at bedtime  clopidogrel Tablet 75 milliGRAM(s) Oral daily  finasteride 5 milliGRAM(s) Oral daily  metoprolol tartrate 12.5 milliGRAM(s) Oral two times a day  pantoprazole    Tablet 40 milliGRAM(s) Oral before breakfast  tamsulosin 0.4 milliGRAM(s) Oral at bedtime  urea Oral Powder 15 Gram(s) Oral two times a day    MEDICATIONS  (PRN):  acetaminophen   Tablet .. 650 milliGRAM(s) Oral every 6 hours PRN Temp greater or equal to 38C (100.4F), Mild Pain (1 - 3)  hemorrhoidal Ointment 1 Application(s) Rectal three times a day PRN reduce swelling          Neuro: A+O x 3, non-focal, speech clear and intact  HEENT: PERRL, EOMI, oral mucosa pink and moist  Neck: supple, no JVD  CV: regular rate, regular rhythm, +S1S2, systolic murmur heard over right 2nd intercostal rib space  Pulm/chest: lung sounds CTA and equal bilaterally, no accessory muscle use noted  Abd: soft, NT, ND, +BS, Brock in place draining yellow urine into bag   Ext: FREDERICK x 4, no C/C/E  Skin: warm, well perfused         Case including assessment/plan of care discussed with   CT surgery attending.  Critical Care time:   35   minutes of noncontinuos critical care time spent evaluating/treating, reviewing imaging, labs, discussing case with multidisciplinary team, discussing plans/goals of care with patient/family to prevent further life threatening depreciation of the patient's condition. Non-inclusive is procedure time.       88yMale with PMH     Complex insertion of Coude catheter      PAST MEDICAL & SURGICAL HISTORY:  Hematuria  Coronary artery disease: status post cardiac stents June 2020  Sinus bradycardia  Former tobacco use: quit 1984  Hyperlipidemia, mild  HTN (hypertension)  Moderate mitral regurgitation  AF (atrial fibrillation)  Severe aortic stenosis  S/P TAVR (transcatheter aortic valve replacement)  S/P cataract surgery  H/O hernia repair
Subjective:88yMale pt with multiple medical and cardiac issues, recent TAVR, failed TOV's, hematuria on readmission. Brock cath placed yesterday, bladder irrigated, few clots aspirated, hematuria has since started to clear.  Pt seen and examined this am.  Pt feeling better today, no complaints. Urine draining well, relatively yellow, small amount of old bloody sediment in tubing.     Borck: yellow, sediment    Vital Signs Last 24 Hrs  T(C): 36.9 (04 Aug 2020 11:42), Max: 37.1 (03 Aug 2020 17:13)  T(F): 98.5 (04 Aug 2020 11:42), Max: 98.7 (03 Aug 2020 17:13)  HR: 60 (04 Aug 2020 11:42) (60 - 67)  BP: 165/78 (04 Aug 2020 11:42) (137/63 - 165/78)  BP(mean): --  RR: 18 (04 Aug 2020 11:42) (18 - 19)  SpO2: 97% (04 Aug 2020 11:42) (96% - 98%)  I&O's Detail    03 Aug 2020 07:01  -  04 Aug 2020 07:00  --------------------------------------------------------  IN:  Total IN: 0 mL    OUT:    Estimated Blood Loss: 1600 mL    Indwelling Catheter - Urethral: 2050 mL    Voided: 500 mL  Total OUT: 4150 mL    Total NET: -4150 mL          Labs:                        11.9   8.66  )-----------( 257      ( 04 Aug 2020 08:36 )             35.8     08-04    130<L>  |  93<L>  |  43.0<H>  ----------------------------<  100<H>  4.9   |  26.0  |  0.76    Ca    8.5<L>      04 Aug 2020 08:36  Mg     2.1     08-04    TPro  5.8<L>  /  Alb  3.1<L>  /  TBili  0.8  /  DBili  x   /  AST  61<H>  /  ALT  60<H>  /  AlkPhos  75  08-04    PT/INR - ( 03 Aug 2020 05:16 )   PT: 16.5 sec;   INR: 1.45 ratio         PTT - ( 03 Aug 2020 05:16 )  PTT:32.0 sec
Subjective:88yMale s/p recent TAVR, returned with drainage around gonzalez catheter. Gonzalez was removed, still had urinary retention, gonzalez replaced, +hematuria.  Pt's hematuria cleared yesterday, but recurred again last night.  pt feeling well this am, no complaints of pain or discomfort.  Gonzalez in place, draining well, light red/pink in color no clots.  Pt remains on ASA and plavix, Eliquis held.    Gonzalez: light red/pink    Vital Signs Last 24 Hrs  T(C): 36.6 (05 Aug 2020 05:35), Max: 36.9 (04 Aug 2020 11:42)  T(F): 97.9 (05 Aug 2020 05:35), Max: 98.5 (04 Aug 2020 11:42)  HR: 63 (05 Aug 2020 05:35) (60 - 63)  BP: 154/80 (05 Aug 2020 05:35) (148/62 - 165/78)  BP(mean): --  RR: 18 (05 Aug 2020 05:35) (18 - 19)  SpO2: 95% (05 Aug 2020 05:35) (95% - 97%)  I&O's Detail    04 Aug 2020 07:01  -  05 Aug 2020 07:00  --------------------------------------------------------  IN:    Oral Fluid: 360 mL  Total IN: 360 mL    OUT:    Indwelling Catheter - Urethral: 2200 mL  Total OUT: 2200 mL    Total NET: -1840 mL          Labs:                        12.8   8.08  )-----------( 280      ( 05 Aug 2020 06:12 )             38.3     08-05    133<L>  |  96<L>  |  38.0<H>  ----------------------------<  104<H>  4.3   |  27.0  |  0.86    Ca    8.6      05 Aug 2020 06:12  Mg     2.1     08-05    TPro  5.8<L>  /  Alb  3.1<L>  /  TBili  0.8  /  DBili  x   /  AST  61<H>  /  ALT  60<H>  /  AlkPhos  75  08-04          Culture - Urine (collected 04 Aug 2020 06:33)  Source: .Urine Catheterized  Final Report (05 Aug 2020 03:41):    No growth
Subjective:88yMale with resolving hematuria, recent TAVR, Eliquis held, on ASA and plavix.  Pt resting comfortably, gonzalez in place, draining well.     Gonzalez: urine    Vital Signs Last 24 Hrs  T(C): 36.5 (06 Aug 2020 05:01), Max: 36.9 (05 Aug 2020 21:25)  T(F): 97.7 (06 Aug 2020 05:01), Max: 98.5 (05 Aug 2020 21:25)  HR: 62 (06 Aug 2020 05:01) (60 - 65)  BP: 111/60 (06 Aug 2020 05:01) (111/60 - 136/84)  BP(mean): --  RR: 18 (06 Aug 2020 05:01) (16 - 18)  SpO2: 97% (06 Aug 2020 05:01) (96% - 98%)  I&O's Detail    05 Aug 2020 07:01  -  06 Aug 2020 07:00  --------------------------------------------------------  IN:    Oral Fluid: 880 mL  Total IN: 880 mL    OUT:    Indwelling Catheter - Urethral: 1800 mL  Total OUT: 1800 mL    Total NET: -920 mL          Labs:                        12.2   9.70  )-----------( 318      ( 06 Aug 2020 05:52 )             37.5     08-06    134<L>  |  95<L>  |  49.0<H>  ----------------------------<  104<H>  4.7   |  29.0  |  0.91    Ca    8.8      06 Aug 2020 05:52  Phos  2.9     08-06  Mg     2.1     08-06    TPro  6.1<L>  /  Alb  2.9<L>  /  TBili  0.6  /  DBili  x   /  AST  43<H>  /  ALT  51<H>  /  AlkPhos  79  08-06          Culture - Urine (collected 04 Aug 2020 06:33)  Source: .Urine Catheterized  Final Report (05 Aug 2020 03:41):    No growth
Subjective: Patient lying in bed, no acute distress noted, stated "I don't know why this is happening again" (leaking around the Brock with hematuria and few clots). Patient denies chest pain, palpitation, dizziness, shortness of breath, urgency of urination, abdominal pain, N/V/D.  VITAL SIGNS  Vital Signs Last 24 Hrs  T(C): 36.6 (08-05-20 @ 05:35), Max: 36.9 (08-04-20 @ 11:42)  T(F): 97.9 (08-05-20 @ 05:35), Max: 98.5 (08-04-20 @ 11:42)  HR: 63 (08-05-20 @ 05:35) (60 - 63)  BP: 154/80 (08-05-20 @ 05:35) (140/78 - 165/78)  RR: 18 (08-05-20 @ 05:35) (18 - 19)  SpO2: 95% (08-05-20 @ 05:35) (95% - 98%)  on room air     Telemetry/Alarms:   V Paced    MEDICATIONS  acetaminophen   Tablet .. 650 milliGRAM(s) Oral every 6 hours PRN  aspirin enteric coated 81 milliGRAM(s) Oral daily  atorvastatin 80 milliGRAM(s) Oral at bedtime  clopidogrel Tablet 75 milliGRAM(s) Oral daily  finasteride 5 milliGRAM(s) Oral daily  hemorrhoidal Ointment 1 Application(s) Rectal three times a day PRN  metoprolol tartrate 12.5 milliGRAM(s) Oral two times a day  tamsulosin 0.4 milliGRAM(s) Oral at bedtime  urea Oral Powder 15 Gram(s) Oral two times a day      PHYSICAL EXAM  General: well nourished, well developed, no acute distress  Neurology: alert and oriented x 3, nonfocal, no gross deficits  Respiratory: clear to auscultation bilaterally  CV: Paced rhythm   Abdomen: soft, nontender, nondistended, positive bowel sounds  : Brock catheter to self drainage bag, hematuria noted (pink colored), improved after flushing. Few blood clots around the catheter noted.   Extremities: warm, well perfused. no edema. + DP pulses bilaterally  Incisions: Right groin Steri-strips intact, left groin incision dry and intact.   Psych: Normal affect and mood    08-03 @ 07:01  -  08-04 @ 07:00  --------------------------------------------------------  IN: 0 mL / OUT: 4150 mL / NET: -4150 mL    08-04 @ 07:01  -  08-05 @ 05:53  --------------------------------------------------------  IN: 240 mL / OUT: 1700 mL / NET: -1460 mL        Weights:  Daily Height in cm: 165.1 (04 Aug 2020 19:52)    Daily   Admit Wt: Drug Dosing Weight  Height (cm): 165.1 (04 Aug 2020 19:52)  Weight (kg): 57.1 (04 Aug 2020 19:52)  BMI (kg/m2): 20.9 (04 Aug 2020 19:52)  BSA (m2): 1.62 (04 Aug 2020 19:52)    All laboratory results, radiology and medications reviewed.    LABS  08-04    130<L>  |  93<L>  |  43.0<H>  ----------------------------<  100<H>  4.9   |  26.0  |  0.76    Ca    8.5<L>      04 Aug 2020 08:36  Mg     2.1     08-04    TPro  5.8<L>  /  Alb  3.1<L>  /  TBili  0.8  /  DBili  x   /  AST  61<H>  /  ALT  60<H>  /  AlkPhos  75  08-04                                 11.9   8.66  )-----------( 257      ( 04 Aug 2020 08:36 )             35.8            Bilirubin Total, Serum: 0.8 mg/dL (08-04 @ 08:36)      < from: Xray Chest 1 View AP/PA. (08.03.20 @ 05:14) >    Comment:  Frontal view of the chest was obtained and compared to the prior study dated 7/30/2020. There is a TAVR. Micra Pacer device is noted overlying the cardiac silhouette. The cardiac silhouette is normal.  The lungs are clear without infiltrate, effusion, or pneumothorax. The osseous structures are significant for thoracic dextro scoliosis with degenerative changes. There are healed upper fractures.      Impression:  1. TAVR. Micra pacer device  2. Clear lungs. Unchanged      < end of copied text >    < from: TTE Echo Complete w/o Contrast w/ Doppler (08.03.20 @ 11:10) >    Summary:   1. Left ventricular ejection fraction, by visual estimation, is >75%.   2. Hyperdynamic global left ventricular systolic function.   3. There is no evidence of pericardial effusion.   4. Mild thickening of the anterior and posterior mitral valve leaflets.   5. Mild to moderate mitral valve regurgitation.   6. Moderate mitral annular calcification.   7. Moderate tricuspid regurgitation.   8. TAVR (Marybel valve) seen in aortic position.   9. Estimated pulmonary artery systolic pressure is 37.1 mmHg assuming a right atrial pressure of 3 mmHg, which is consistent with borderline pulmonary hypertension.    < end of copied text >    PAST MEDICAL & SURGICAL HISTORY:  Hematuria  Coronary artery disease: status post cardiac stents June 2020  Sinus bradycardia  Former tobacco use: quit 1984  Hyperlipidemia, mild  HTN (hypertension)  Moderate mitral regurgitation  AF (atrial fibrillation)  Severe aortic stenosis  S/P TAVR (transcatheter aortic valve replacement)  S/P cataract surgery  H/O hernia repair

## 2020-08-06 NOTE — PROGRESS NOTE ADULT - PROBLEM SELECTOR PLAN 1
Hematuria resolved to clear urine on 8/4  noted to have mild to moderate amount of hematuria with blood clots around the catheter today 8/5  Discussed with Urology ACP, flushed the catheter without any resistance, recommended to flush every 4 hours.  Will Hold Eliquis  Will repeat CBC   Maintain Brock   Will monitor closely
Hematuria resolved to clear urine on 8/4  noted to have mild to moderate amount of hematuria with blood clots around the catheter 8/5  Discussed with Urology ACP, flushed the catheter without any resistance, recommended to flush every 4 hours.  Will Hold Eliquis  Will repeat CBC   Maintain Brock   Will monitor closely
Hold Eliquis  Maintain Brock   Urology following

## 2020-08-06 NOTE — PROGRESS NOTE ADULT - PROBLEM SELECTOR PROBLEM 2
S/P TAVR (transcatheter aortic valve replacement)

## 2020-08-06 NOTE — PROGRESS NOTE ADULT - PROBLEM SELECTOR PLAN 2
ASA Plavix  Echo completed, result as above
ASA Plavix  Echo completed
ASA Plavix  Echo completed, result as above

## 2020-08-06 NOTE — DISCHARGE NOTE NURSING/CASE MANAGEMENT/SOCIAL WORK - PATIENT PORTAL LINK FT
You can access the FollowMyHealth Patient Portal offered by [facility] by registering at the following website: [email or web address]. By joining [email or web address]’s FollowMyHealth portal, you will also be able to view your health information using other applications (apps) compatible with our system.

## 2020-08-06 NOTE — PROGRESS NOTE ADULT - PROBLEM SELECTOR PLAN 4
hold Eliquis with jennifer hematuria

## 2020-08-06 NOTE — PROGRESS NOTE ADULT - PROBLEM SELECTOR PLAN 7
SCD for DVT prophylaxis  Protonix for PUD prophylaxis  Plan and care needs to discuss with CTS team in AM rounds
SCD for DVT prophylaxis  Protonix for PUD prophylaxis  Plan and care needs to discuss with CTS team in AM rounds

## 2020-08-06 NOTE — PROGRESS NOTE ADULT - PROBLEM SELECTOR PLAN 3
Flomax  Proscar  urology consult appreciated   maintain Brock upon discharge
Flomax  Proscar  urology consult appreciated   maintain Brock upon discharge
Flomax  Proscar  urology consult appreciated   maintain gonzalez upon discharge

## 2020-08-06 NOTE — PROGRESS NOTE ADULT - ASSESSMENT
88 year old male known to CT surgery s/p TAVR (7/24) and s/p [employer] Micra AV leadless PPM Implant on 7/29 now presented to ED c/o sudden onset left sided non- radiating dull chest pain for 1 hour, relieved with Asprin 325 mg. Patient was originally discharged home on 7/31 with Gonzalez catheter for urinary retention. Patient was discharged home on 7/31 returned back to ED yesterday 8/2 with c/o leaking around the Gonzalez with noted hematuria. Gonzalez catheter was removed from ED and was discharged home same day after patient was passed TOV. Patient was on Eliquis at home. No bladder scan or CBC was performed prior to discharge.  On examination patient denies chest pain, palpitation, shortness of breath, dizziness, headache, vision changes, numbness, tingling, abdominal pain, N/V/D. Urology PA evaluated >gonzalez placed with jennifer hematuria   8/4 Hematuria resolving, urine clear  8/5 Noted to have hematuria with few blood clots around the catheter.   8/6 plan to discharge home in AM

## 2020-08-06 NOTE — PROGRESS NOTE ADULT - REASON FOR ADMISSION
urinary retention, hematuria
urinary retention,hematuria
urinary retention, hematuria
urinary retention,hematuria
urinary retention, hematuria, chest pain
urinary retention,hematuria

## 2020-08-06 NOTE — PROGRESS NOTE ADULT - ASSESSMENT
89 yo male pt with multiple medical and cardiac issues, recent TAVR, multiple failed TOV's, gross hematuria resolved  - continue gonzalez cath  - d/c home with gonzalez to leg bag  - cont proscar and flomax  - pt urologically stable for d/c to home, urine remains clear  - f/u as OP in 1 week

## 2020-08-07 ENCOUNTER — TRANSCRIPTION ENCOUNTER (OUTPATIENT)
Age: 85
End: 2020-08-07

## 2020-08-07 ENCOUNTER — APPOINTMENT (OUTPATIENT)
Dept: UROLOGY | Facility: CLINIC | Age: 85
End: 2020-08-07

## 2020-08-08 ENCOUNTER — TRANSCRIPTION ENCOUNTER (OUTPATIENT)
Age: 85
End: 2020-08-08

## 2020-08-09 ENCOUNTER — EMERGENCY (EMERGENCY)
Facility: HOSPITAL | Age: 85
LOS: 1 days | Discharge: HOME | End: 2020-08-09
Attending: EMERGENCY MEDICINE
Payer: MEDICARE

## 2020-08-09 ENCOUNTER — TRANSCRIPTION ENCOUNTER (OUTPATIENT)
Age: 85
End: 2020-08-09

## 2020-08-09 VITALS
WEIGHT: 110.89 LBS | SYSTOLIC BLOOD PRESSURE: 144 MMHG | OXYGEN SATURATION: 99 % | RESPIRATION RATE: 18 BRPM | HEART RATE: 62 BPM | DIASTOLIC BLOOD PRESSURE: 77 MMHG | HEIGHT: 65 IN | TEMPERATURE: 98 F

## 2020-08-09 VITALS
DIASTOLIC BLOOD PRESSURE: 73 MMHG | RESPIRATION RATE: 18 BRPM | OXYGEN SATURATION: 98 % | TEMPERATURE: 98 F | SYSTOLIC BLOOD PRESSURE: 131 MMHG | HEART RATE: 62 BPM

## 2020-08-09 DIAGNOSIS — Z95.2 PRESENCE OF PROSTHETIC HEART VALVE: Chronic | ICD-10-CM

## 2020-08-09 DIAGNOSIS — Z98.890 OTHER SPECIFIED POSTPROCEDURAL STATES: Chronic | ICD-10-CM

## 2020-08-09 DIAGNOSIS — Z98.49 CATARACT EXTRACTION STATUS, UNSPECIFIED EYE: Chronic | ICD-10-CM

## 2020-08-09 LAB
ANION GAP SERPL CALC-SCNC: 10 MMOL/L — SIGNIFICANT CHANGE UP (ref 5–17)
APPEARANCE UR: ABNORMAL
APTT BLD: 28.6 SEC — SIGNIFICANT CHANGE UP (ref 27.5–35.5)
BACTERIA # UR AUTO: NEGATIVE — SIGNIFICANT CHANGE UP
BASOPHILS # BLD AUTO: 0.05 K/UL — SIGNIFICANT CHANGE UP (ref 0–0.2)
BASOPHILS NFR BLD AUTO: 0.6 % — SIGNIFICANT CHANGE UP (ref 0–2)
BILIRUB UR-MCNC: NEGATIVE — SIGNIFICANT CHANGE UP
BUN SERPL-MCNC: 47 MG/DL — HIGH (ref 8–20)
CALCIUM SERPL-MCNC: 8.8 MG/DL — SIGNIFICANT CHANGE UP (ref 8.6–10.2)
CHLORIDE SERPL-SCNC: 98 MMOL/L — SIGNIFICANT CHANGE UP (ref 98–107)
CO2 SERPL-SCNC: 27 MMOL/L — SIGNIFICANT CHANGE UP (ref 22–29)
COLOR SPEC: ABNORMAL
COMMENT - URINE: SIGNIFICANT CHANGE UP
CREAT SERPL-MCNC: 0.94 MG/DL — SIGNIFICANT CHANGE UP (ref 0.5–1.3)
DIFF PNL FLD: ABNORMAL
EOSINOPHIL # BLD AUTO: 1 K/UL — HIGH (ref 0–0.5)
EOSINOPHIL NFR BLD AUTO: 12 % — HIGH (ref 0–6)
EPI CELLS # UR: NEGATIVE — SIGNIFICANT CHANGE UP
GLUCOSE SERPL-MCNC: 110 MG/DL — HIGH (ref 70–99)
GLUCOSE UR QL: NEGATIVE MG/DL — SIGNIFICANT CHANGE UP
HCT VFR BLD CALC: 37.7 % — LOW (ref 39–50)
HGB BLD-MCNC: 12.3 G/DL — LOW (ref 13–17)
HYALINE CASTS # UR AUTO: ABNORMAL /LPF
IMM GRANULOCYTES NFR BLD AUTO: 0.4 % — SIGNIFICANT CHANGE UP (ref 0–1.5)
INR BLD: 1.21 RATIO — HIGH (ref 0.88–1.16)
KETONES UR-MCNC: ABNORMAL
LEUKOCYTE ESTERASE UR-ACNC: NEGATIVE — SIGNIFICANT CHANGE UP
LYMPHOCYTES # BLD AUTO: 0.75 K/UL — LOW (ref 1–3.3)
LYMPHOCYTES # BLD AUTO: 9 % — LOW (ref 13–44)
MCHC RBC-ENTMCNC: 30.7 PG — SIGNIFICANT CHANGE UP (ref 27–34)
MCHC RBC-ENTMCNC: 32.6 GM/DL — SIGNIFICANT CHANGE UP (ref 32–36)
MCV RBC AUTO: 94 FL — SIGNIFICANT CHANGE UP (ref 80–100)
MONOCYTES # BLD AUTO: 0.75 K/UL — SIGNIFICANT CHANGE UP (ref 0–0.9)
MONOCYTES NFR BLD AUTO: 9 % — SIGNIFICANT CHANGE UP (ref 2–14)
NEUTROPHILS # BLD AUTO: 5.74 K/UL — SIGNIFICANT CHANGE UP (ref 1.8–7.4)
NEUTROPHILS NFR BLD AUTO: 69 % — SIGNIFICANT CHANGE UP (ref 43–77)
NITRITE UR-MCNC: NEGATIVE — SIGNIFICANT CHANGE UP
PH UR: 8 — SIGNIFICANT CHANGE UP (ref 5–8)
PLATELET # BLD AUTO: 354 K/UL — SIGNIFICANT CHANGE UP (ref 150–400)
POTASSIUM SERPL-MCNC: 4.3 MMOL/L — SIGNIFICANT CHANGE UP (ref 3.5–5.3)
POTASSIUM SERPL-SCNC: 4.3 MMOL/L — SIGNIFICANT CHANGE UP (ref 3.5–5.3)
PROT UR-MCNC: 500 MG/DL
PROTHROM AB SERPL-ACNC: 13.9 SEC — HIGH (ref 10.6–13.6)
RBC # BLD: 4.01 M/UL — LOW (ref 4.2–5.8)
RBC # FLD: 14.9 % — HIGH (ref 10.3–14.5)
RBC CASTS # UR COMP ASSIST: SIGNIFICANT CHANGE UP /HPF (ref 0–4)
SODIUM SERPL-SCNC: 135 MMOL/L — SIGNIFICANT CHANGE UP (ref 135–145)
SP GR SPEC: 1.01 — SIGNIFICANT CHANGE UP (ref 1.01–1.02)
UROBILINOGEN FLD QL: NEGATIVE MG/DL — SIGNIFICANT CHANGE UP
WBC # BLD: 8.32 K/UL — SIGNIFICANT CHANGE UP (ref 3.8–10.5)
WBC # FLD AUTO: 8.32 K/UL — SIGNIFICANT CHANGE UP (ref 3.8–10.5)
WBC UR QL: SIGNIFICANT CHANGE UP

## 2020-08-09 PROCEDURE — 87186 SC STD MICRODIL/AGAR DIL: CPT

## 2020-08-09 PROCEDURE — 87086 URINE CULTURE/COLONY COUNT: CPT

## 2020-08-09 PROCEDURE — 36415 COLL VENOUS BLD VENIPUNCTURE: CPT

## 2020-08-09 PROCEDURE — 99284 EMERGENCY DEPT VISIT MOD MDM: CPT

## 2020-08-09 PROCEDURE — 99283 EMERGENCY DEPT VISIT LOW MDM: CPT

## 2020-08-09 PROCEDURE — 80048 BASIC METABOLIC PNL TOTAL CA: CPT

## 2020-08-09 PROCEDURE — 85610 PROTHROMBIN TIME: CPT

## 2020-08-09 PROCEDURE — 85730 THROMBOPLASTIN TIME PARTIAL: CPT

## 2020-08-09 PROCEDURE — 81001 URINALYSIS AUTO W/SCOPE: CPT

## 2020-08-09 PROCEDURE — 85027 COMPLETE CBC AUTOMATED: CPT

## 2020-08-09 NOTE — ED PROVIDER NOTE - PMH
AF (atrial fibrillation)    Coronary artery disease  status post cardiac stents June 2020  Former tobacco use  quit 1984  Hematuria    HTN (hypertension)    Hyperlipidemia, mild    Moderate mitral regurgitation    Severe aortic stenosis    Sinus bradycardia

## 2020-08-09 NOTE — ED ADULT NURSE NOTE - CHIEF COMPLAINT QUOTE
hematuria, bladder spasms and discharge from penis. pending outpt urology follow up.  hx of hemorroids as per pt when having a BM "something just doesn't feel right."

## 2020-08-09 NOTE — CONSULT NOTE ADULT - SUBJECTIVE AND OBJECTIVE BOX
HPI:  88M  PMHx HTN, CAD s/p PCI, Afib, Recent TAVR, Sinus ronit, HLD who was recently discharged (8/6) with gonzalez to leg bag after failing multiple TOV last admission and episodes of hematuria, returns to the ED today for recurrent hematuria with small clots noted in gonzalez with associated penile pain.  Denies fever, chills, decreased urine output.  Does not recall accidentally pulling on gonzalez possibly causing trauma.    ROS negative except for above    PAST MEDICAL & SURGICAL HISTORY:  Coronary artery disease: status post cardiac stents June 2020  Sinus bradycardia  Former tobacco use: quit 1984  Hyperlipidemia, mild  HTN (hypertension)  Moderate mitral regurgitation  AF (atrial fibrillation)  Severe aortic stenosis  S/P cataract surgery  H/O hernia repair    Allergies: NKDA          MEDICATIONS  (STANDING):    MEDICATIONS  (PRN):      Vital Signs Last 24 Hrs  T(C): 36.4 (09 Aug 2020 04:42), Max: 36.4 (09 Aug 2020 04:42)  T(F): 97.6 (09 Aug 2020 04:42), Max: 97.6 (09 Aug 2020 04:42)  HR: 62 (09 Aug 2020 04:42) (62 - 62)  BP: 144/77 (09 Aug 2020 04:42) (144/77 - 144/77)  BP(mean): --  RR: 18 (09 Aug 2020 04:42) (18 - 18)  SpO2: 99% (09 Aug 2020 04:42) (99% - 99%)    PHYSICAL EXAM:      Constitutional: NAD    Respiratory: no accessory muscle use    Cardiovascular: S1S2    Gastrointestinal: mild suprapubic tenderness, no distention    Genitourinary: normal appearing external genitalia, uncircumcised, gonzalez in place without discharge from around the catheter, red, almost see through urine in catheter tubing with a couple of small clots, Stat-Loc device in place          I&O's Detail      LABS:                RADIOLOGY & ADDITIONAL STUDIES:

## 2020-08-09 NOTE — ED PROVIDER NOTE - NSFOLLOWUPINSTRUCTIONS_ED_ALL_ED_FT
follow up with Dr Craft as scheduled   follow up with urine culture for antibiotics  return to ed with worse symptoms

## 2020-08-09 NOTE — ED PROVIDER NOTE - OBJECTIVE STATEMENT
87 yo M presents to ED c/o gross hematuria with clots in gonzalez bag.  pt s/p recent TAVR and developed hematuria post-op while in hospital and was  sen and evaluated by Urology/Dr. Craft.  Eliquis stopped while in hospital and pt only taking baby ASA and Plavix at present.  Pt denies any assoc and pain fever or N/V.  Pt c/o penile "discomfort" secondary to catheter

## 2020-08-09 NOTE — ED ADULT NURSE NOTE - OBJECTIVE STATEMENT
Pt alert and oriented x3, recently discharged after TAVR x 3 days ago. Pt had urinary retention and required a gonzalez catheter. Presents today with hematuria in Gonzalez bag. Pt states it started early this morning, has tenderness in penile tip. Pt denies fevers, chills, n/v, CP or SOB. . Urology at bedside to irrigate. No clots noted. Pt is in no acute distress at this time. All safety measures are in place.

## 2020-08-09 NOTE — ED PROVIDER NOTE - AGGRAVATING FACTORS
[facility]    [address]    [facility] [address]    Phone:  [phone]    Fax:  [phone]       Thank You for choosing us for your health care visit. We are glad to serve you and happy to provide you with this summary of your visit. Please help us to ensure we have accurate records. If you find anything that needs to be changed, please let our staff know as soon as possible.          Your Demographic Information     Patient Name Sex     Chencho Howard Male 1963       Ethnic Group Patient Race    Not of  or  Origin White      Your Visit Details     Date & Time Provider Department    2017 11:00 AM Josh Albarado MD [facility]      Your Upcoming Appointment*(Max 10)     2017  8:30 AM CDT   Follow-up Visit with Solitario Bourgeois MD   [facility] ([facility])    [address] [facility] [address]   [phone]              Your Vitals Were     BP Pulse Height Weight BMI Smoking Status    192/88 (BP Location: [facility] [place], Patient Position: Sitting, Cuff Size: Large Adult) 76 6' 1\" (1.854 m) 328 lb 7.8 oz (149 kg) 43.34 kg/m2 Former Smoker      Medications Prescribed or Re-Ordered Today     None      Your Current Medications Are        Disp Refills Start End    torsemide (DEMADEX) 20 MG tablet 90 tablet 11 3/30/2017     Sig - Route: Take 3 tablets by mouth daily. - Oral    Class: Eprescribe    amLODIPine (NORVASC) 10 MG tablet 90 tablet 1 3/17/2017     Sig - Route: Take 1 tablet by mouth daily. - Oral    Class: Eprescribe    hydrALAZINE (APRESOLINE) 100 MG tablet 270 tablet 1 3/17/2017     Sig - Route: Take 1 tablet by mouth 3 times daily. - Oral    Class: Eprescribe    atorvastatin (LIPITOR) 20 MG tablet 90 tablet 4 3/17/2017     Sig - Route: Take 1 tablet by mouth daily. - Oral    Class: Eprescribe    ferrous sulfate 325 (65 FE) MG tablet        Sig - Route: Take 325 mg by mouth daily  (with breakfast). - Oral    Class: Historical Med    aspirin 81 MG tablet        Sig - Route: Take 81 mg by mouth daily. - Oral    Class: Historical Med    metoPROLOL (TOPROL-XL) 100 MG 24 hr tablet 30 tablet 5 1/26/2017     Sig: TAKE ONE TABLET BY MOUTH DAILY    Class: Eprescribe    losartan (COZAAR) 100 MG tablet 90 tablet 1 1/20/2017     Sig - Route: Take 1 tablet by mouth daily. - Oral    Class: Eprescribe    glipiZIDE (GLUCOTROL) 10 MG tablet 60 tablet 3 10/25/2016     Sig - Route: Take 1 tablet by mouth 2 times daily (before meals). - Oral    Class: Eprescribe      Allergies     Lisinopril Cough    Metformin DIARRHEA      Immunizations History as of 4/7/2017     Name Date    Influenza 10/16/2016    Pneumococcal Polysaccharide Adult 3/13/2017  7:47 PM    Tdap 3/26/2009      Problem List as of 4/7/2017     Chronic diastolic CHF (congestive heart failure), NYHA class 2    Diabetes type 2, uncontrolled    Hyperlipidemia    Hypertension    Morbid obesity    CHRISSY on CPAP    Pulmonary hypertension due to hypoxia              Patient Instructions                                 Our patient satisfaction survey has a top rating of \"10.\"     It is always our goal to provide you with exceptional care.  We appreciate the time you take to give us feedback.    We hope you will recommend us to all your family and friends.    Your [facility] Care Team    If you have questions about your care,   Please call the clinic manager at [phone].     Thank you,       Dr. Josh Albarado, Quirino and Madhavi                unknown

## 2020-08-09 NOTE — ED PROVIDER NOTE - CONSTITUTIONAL, MLM
normal... Elderly F  awake, alert, oriented to person, place, time/situation and in no apparent distress.

## 2020-08-09 NOTE — ED PROVIDER NOTE - PATIENT PORTAL LINK FT
You can access the FollowMyHealth Patient Portal offered by [facility] by registering at the following website: [email or web address]. By joining [facility]’s FollowMyHealth portal, you will also be able to view your health information using other applications (apps) compatible with our system.

## 2020-08-09 NOTE — ED ADULT TRIAGE NOTE - CHIEF COMPLAINT QUOTE
hematuria, bladder spasms and discharge from penis. pending outpt urology follow up hematuria, bladder spasms and discharge from penis. pending outpt urology follow up.  hx of hemorroids as per pt when having a BM "something just doesn't feel right."

## 2020-08-09 NOTE — ED PROVIDER NOTE - GENITOURINARY [+], MLM
[FreeTextEntry1] : Chest CT-new nodular opacity in right lower lobe, branching, most likely inflammatory
s/p gonzalez placement

## 2020-08-09 NOTE — PROGRESS NOTE ADULT - SUBJECTIVE AND OBJECTIVE BOX
Pt seen and examined this AM. gonzalez irrigated, no clots present. + hematuria  no complaints  discussed with samra Prado to d/c home. has outpatient f/u 8/11

## 2020-08-09 NOTE — CONSULT NOTE ADULT - ASSESSMENT
88M with PMHx as above with recurrent hematuria    PROCEDURE:  - gonzalez placement checked, balloon deflated, gonzalez hubbed and balloon reinflated with some discomfort  - Gonzalez flushed with 300-400cc sterile water without difficulty and no clots removed, urine lightened to pink after flushing    - check CBC, if stable can be discharged home from urological standpoint  - already scheduled to follow up with Dr. Craft on 8/11 in 2 days

## 2020-08-09 NOTE — ED ADULT NURSE REASSESSMENT NOTE - NS ED NURSE REASSESS COMMENT FT1
Received report from nightshift RN.  Pt a&o x 4.  Pt denies pain.  NAD noted, respirations even and unlabored. IV #20 to L forearm patent with no signs of infiltration.  Brock intact, 400 ml of output, bright red urine.  Pt sitting comfortably in stretcher with safety precautions in place.  Plan of care explained, pt verbalized understanding.

## 2020-08-09 NOTE — ED ADULT NURSE REASSESSMENT NOTE - NS ED NURSE REASSESS COMMENT FT1
pt in no apparent distress, denies any pain at the moment, plan of care explained to pt, pt verbalized understanding.

## 2020-08-09 NOTE — ED CLERICAL - NS ED CLERK NOTE PRE-ARRIVAL INFORMATION; ADDITIONAL PRE-ARRIVAL INFORMATION
This patient is enrolled in the Follow Your Heart program and has undergone a cardiac surgery procedure and has active care navigation. This patient can be followed up by the care navigation team within 24 hours. To arrange close follow-up or to obtain additional clinical information about this patient, please call the contact number above.     Please call the cardiac surgery team once patient is registered at [phone] for consultation PRIOR to disposition decision.  The patient recently underwent a cardiac surgery procedure and the team can assist in acute medical management. Black phone icon.

## 2020-08-09 NOTE — ED PROVIDER NOTE - CPE EDP CARDIAC NORM
"    Preventive Care at the [facility] Visit    Growth Measurements & Percentiles  Head Circumference: 13.75\" (34.9 cm) (55 %, Source: WHO (Boys, 0-2 years)) 55 %ile based on WHO (Boys, 0-2 years) head circumference-for-age data using vitals from 2018.   Birth Weight: 7 lbs 7.58 oz   Weight: 6 lbs 11.2 oz / 3.04 kg (actual weight) / 19 %ile based on WHO (Boys, 0-2 years) weight-for-age data using vitals from 2018.   Length: 1' 8.25\" / 51.4 cm 70 %ile based on WHO (Boys, 0-2 years) length-for-age data using vitals from 2018.   Weight for length: 2 %ile based on WHO (Boys, 0-2 years) weight-for-recumbent length data using vitals from 2018.    Recommended preventive visits for your :  2 weeks old  2 months old    Here s what your baby might be doing from birth to 2 months of age.    Growth and development    Begins to smile at familiar faces and voices, especially parents  voices.    Movements become less jerky.    Lifts chin for a few seconds when lying on the tummy.    Cannot hold head upright without support.    Holds onto an object that is placed in his hand.    Has a different cry for different needs, such as hunger or a wet diaper.    Has a fussy time, often in the evening.  This starts at about 2 to 3 weeks of age.    Makes noises and cooing sounds.    Usually gains 4 to 5 ounces per week.      Vision and hearing    Can see about one foot away at birth.  By 2 months, he can see about 10 feet away.    Starts to follow some moving objects with eyes.  Uses eyes to explore the world.    Makes eye contact.    Can see colors.    Hearing is fully developed.  He will be startled by loud sounds.    Things you can do to help your child  1. Talk and sing to your baby often.  2. Let your baby look at faces and bright colors.    All babies are different    The information here shows average development.  All babies develop at their own rate.  Certain behaviors and physical milestones tend to occur at " "certain ages, but there is a wide range of growth and behavior that is normal.  Your baby might reach some milestones earlier or later than the average child.  If you have any concerns about your baby s development, talk with your doctor or nurse.      Feeding  The only food your baby needs right now is breast milk or iron-fortified formula.  Your baby does not need water at this age.  Ask your doctor about giving your baby a Vitamin D supplement.    Breastfeeding tips    Breastfeed every 2-4 hours. If your baby is sleepy - use breast compression, push on chin to \"start up\" baby, switch breasts, undress to diaper and wake before relatching.     Some babies \"cluster\" feed every 1 hour for a while- this is normal. Feed your baby whenever he/she is awake-  even if every hour for a while. This frequent feeding will help you make more milk and encourage your baby to sleep for longer stretches later in the evening or night.      Position your baby close to you with pillows so he/she is facing you -belly to belly laying horizontally across your lap at the level of your breast and looking a bit \"upwards\" to your breast     One hand holds the baby's neck behind the ears and the other hand holds your breast    Baby's nose should start out pointing to your nipple before latching    Hold your breast in a \"sandwich\" position by gently squeezing your breast in an oval shape and make sure your hands are not covering the areola    This \"nipple sandwich\" will make it easier for your breast to fit inside the baby's mouth-making latching more comfortable for you and baby and preventing sore nipples. Your baby should take a \"mouthful\" of breast!    You may want to use hand expression to \"prime the pump\" and get a drip of milk out on your nipple to wake baby     (see website: newborns.[employer].edu/Breastfeeding/HandExpression.html)    Swipe your nipple on baby's upper lip and wait for a BIG open mouth    YOU bring baby to the breast " "(hold baby's neck with your fingers just below the ears) and bring baby's head to the breast--leading with the chin.  Try to avoid pushing your breast into baby's mouth- bring baby to you instead!    Aim to get your baby's bottom lip LOW DOWN ON AREOLA (baby's upper lip just needs to \"clear\" the nipple).     Your baby should latch onto the areola and NOT just the nipple. That way your baby gets more milk and you don't get sore nipples!     Websites about breastfeeding  www.womenshealth.gov/breastfeeding - many topics and videos   www.breastfeedingonline.com  - general information and videos about latching  http://newborns.[employer].edu/Breastfeeding/HandExpression.html - video about hand expression   http://newborns.[employer].edu/Breastfeeding/ABCs.html#ABCs  - general information  [employer] - information about breastfeeding and support groups    Formula  General guidelines    Age   # time/day   Serving Size     0-1 Month   6-8 times   2-4 oz     1-2 Months   5-7 times   3-5 oz     2-3 Months   4-6 times   4-7 oz     3-4 Months    4-6 times   5-8 oz       If bottle feeding your baby, hold the bottle.  Do not prop it up.    During the daytime, do not let your baby sleep more than four hours between feedings.  At night, it is normal for young babies to wake up to eat about every two to four hours.    Hold, cuddle and talk to your baby during feedings.    Do not give any other foods to your baby.  Your baby s body is not ready to handle them.    Babies like to suck.  For bottle-fed babies, try a pacifier if your baby needs to suck when not feeding.  If your baby is breastfeeding, try having him suck on your finger for comfort--wait two to three weeks (or until breast feeding is well established) before giving a pacifier, so the baby learns to latch well first.    Never put formula or breast milk in the microwave.    To warm a bottle of formula or breast milk, place it in a bowl of warm water " for a few minutes.  Before feeding your baby, make sure the breast milk or formula is not too hot.  Test it first by squirting it on the inside of your wrist.    Concentrated liquid or powdered formulas need to be mixed with water.  Follow the directions on the can.      Sleeping    Most babies will sleep about 16 hours a day or more.    You can do the following to reduce the risk of SIDS (sudden infant death syndrome):    Place your baby on his back.  Do not place your baby on his stomach or side.    Do not put pillows, loose blankets or stuffed animals under or near your baby.    If you think you baby is cold, put a second sleep sack on your child.    Never smoke around your baby.      If your baby sleeps in a crib or bassinet:    If you choose to have your baby sleep in a crib or bassinet, you should:      Use a firm, flat mattress.    Make sure the railings on the crib are no more than 2 3/8 inches apart.  Some older cribs are not safe because the railings are too far apart and could allow your baby s head to become trapped.    Remove any soft pillows or objects that could suffocate your baby.    Check that the mattress fits tightly against the sides of the bassinet or the railings of the crib so your baby s head cannot be trapped between the mattress and the sides.    Remove any decorative trimmings on the crib in which your baby s clothing could be caught.    Remove hanging toys, mobiles, and rattles when your baby can begin to sit up (around 5 or 6 months)    Lower the level of the mattress and remove bumper pads when your baby can pull himself to a standing position, so he will not be able to climb out of the crib.    Avoid loose bedding.      Elimination    Your baby:    May strain to pass stools (bowel movements).  This is normal as long as the stools are soft, and he does not cry while passing them.    Has frequent, soft stools, which will be runny or pasty, yellow or green and  seedy.   This is  normal.    Usually wets at least six diapers a day.      Safety      Always use an approved car seat.  This must be in the back seat of the car, facing backward.  For more information, check out www.seatcheck.org.    Never leave your baby alone with small children or pets.    Pick a safe place for your baby s crib.  Do not use an older drop-side crib.    Do not drink anything hot while holding your baby.    Don t smoke around your baby.    Never leave your baby alone in water.  Not even for a second.    Do not use sunscreen on your baby s skin.  Protect your baby from the sun with hats and canopies, or keep your baby in the shade.    Have a carbon monoxide detector near the furnace area.    Use properly working smoke detectors in your house.  Test your smoke detectors when daylight savings time begins and ends.      When to call the doctor    Call your baby s doctor or nurse if your baby:      Has a rectal temperature of 100.4 F (38 C) or higher.    Is very fussy for two hours or more and cannot be calmed or comforted.    Is very sleepy and hard to awaken.      What you can expect      You will likely be tired and busy    Spend time together with family and take time to relax.    If you are returning to work, you should think about .    You may feel overwhelmed, scared or exhausted.  Ask family or friends for help.  If you  feel blue  for more than 2 weeks, call your doctor.  You may have depression.    Being a parent is the biggest job you will ever have.  Support and information are important.  Reach out for help when you feel the need.      For more information on recommended immunizations:    www.cdc.gov/nip    For general medical information and more  Immunization facts go to:  www.aap.org  www.aafp.org  www.[place].org  www.cdc.gov/hepatitis  www.immunize.org  www.immunize.org/express  www.immunize.org/stories  www.vaccines.org    For early childhood family education programs in your school  district, go to: www1.[employer].net/~ecfe    For help with food, housing, clothing, medicines and other essentials, call:  [employer] - at 881-550-4020      How often should my child/teen be seen for well check-ups?       (5-8 days)    2 weeks    2 months    4 months    6 months    9 months    12 months    15 months    18 months    24 months    30 months    3 years and every year through 18 years of age   normal...

## 2020-08-10 ENCOUNTER — TRANSCRIPTION ENCOUNTER (OUTPATIENT)
Age: 85
End: 2020-08-10

## 2020-08-11 ENCOUNTER — APPOINTMENT (OUTPATIENT)
Dept: UROLOGY | Facility: CLINIC | Age: 85
End: 2020-08-11
Payer: MEDICARE

## 2020-08-11 VITALS — SYSTOLIC BLOOD PRESSURE: 117 MMHG | TEMPERATURE: 98 F | DIASTOLIC BLOOD PRESSURE: 70 MMHG | HEART RATE: 86 BPM

## 2020-08-11 PROCEDURE — 99203 OFFICE O/P NEW LOW 30 MIN: CPT

## 2020-08-11 RX ORDER — PANTOPRAZOLE 40 MG/1
40 TABLET, DELAYED RELEASE ORAL
Qty: 30 | Refills: 0 | Status: DISCONTINUED | COMMUNITY
Start: 2020-06-05

## 2020-08-11 RX ORDER — LOSARTAN POTASSIUM 100 MG/1
100 TABLET, FILM COATED ORAL
Refills: 0 | Status: DISCONTINUED | COMMUNITY
End: 2020-08-11

## 2020-08-11 RX ORDER — APIXABAN 2.5 MG/1
2.5 TABLET, FILM COATED ORAL
Refills: 0 | Status: DISCONTINUED | COMMUNITY
End: 2020-08-11

## 2020-08-11 RX ORDER — VALSARTAN 320 MG/1
320 TABLET, COATED ORAL
Refills: 0 | Status: DISCONTINUED | COMMUNITY
End: 2020-08-11

## 2020-08-11 RX ORDER — METOPROLOL TARTRATE 25 MG/1
25 TABLET, FILM COATED ORAL
Qty: 30 | Refills: 0 | Status: ACTIVE | COMMUNITY
Start: 2020-07-31

## 2020-08-11 RX ORDER — MUPIROCIN 20 MG/G
2 OINTMENT TOPICAL
Qty: 1 | Refills: 0 | Status: DISCONTINUED | COMMUNITY
Start: 2020-07-16 | End: 2020-08-11

## 2020-08-11 RX ORDER — FUROSEMIDE 20 MG/1
20 TABLET ORAL
Qty: 30 | Refills: 0 | Status: DISCONTINUED | COMMUNITY
Start: 2020-03-13

## 2020-08-11 RX ORDER — CEPHALEXIN 500 MG
1 CAPSULE ORAL
Qty: 14 | Refills: 0
Start: 2020-08-11 | End: 2020-08-17

## 2020-08-13 ENCOUNTER — APPOINTMENT (OUTPATIENT)
Dept: UROLOGY | Facility: CLINIC | Age: 85
End: 2020-08-13
Payer: MEDICARE

## 2020-08-13 VITALS — TEMPERATURE: 98.2 F | WEIGHT: 119 LBS | RESPIRATION RATE: 15 BRPM | BODY MASS INDEX: 20.32 KG/M2 | HEIGHT: 64 IN

## 2020-08-13 PROCEDURE — 51700 IRRIGATION OF BLADDER: CPT

## 2020-08-13 PROCEDURE — 99214 OFFICE O/P EST MOD 30 MIN: CPT | Mod: 25

## 2020-08-13 PROCEDURE — 51798 US URINE CAPACITY MEASURE: CPT

## 2020-08-13 NOTE — HISTORY OF PRESENT ILLNESS
[FreeTextEntry1] : Presents today with daughter for TOV. Developed post surgical urinary retention, s/p TAV. Currently on Keflex for UTI day 3/5. No hematuria, fever, chills.

## 2020-08-13 NOTE — ASSESSMENT
[FreeTextEntry1] : Gross hematuria\par \par Discussed with pt and daughter indications of gross hematuria, workup of hematuria.\par CT angio 6/2020> unremarkable bladder, kidneys no mass or hydro\par At this time does not want to proceed with Cysto\par \par UTI\par Urine culture 8/9/20 + E. Coli\par Will treat with Keflex\par Pt will RTO 2 days for TOV

## 2020-08-13 NOTE — PHYSICAL EXAM
[General Appearance - Well Developed] : well developed [General Appearance - Well Nourished] : well nourished [Normal Appearance] : normal appearance [General Appearance - In No Acute Distress] : no acute distress [Well Groomed] : well groomed [] : no respiratory distress [Skin Color & Pigmentation] : normal skin color and pigmentation [FreeTextEntry1] : Brock with yellow urine [Exaggerated Use Of Accessory Muscles For Inspiration] : no accessory muscle use [Respiration, Rhythm And Depth] : normal respiratory rhythm and effort [Oriented To Time, Place, And Person] : oriented to person, place, and time [Normal Station and Gait] : the gait and station were normal for the patient's age

## 2020-08-13 NOTE — ASSESSMENT
[FreeTextEntry1] : Urinary retention \par \par TOV> 180 cc sterile water instilled into bladder. Pt voided 75 cc. Had patient drink water and void in bathroom. States he drank 20+ oz. PVR 70 ml.\par RTO 2 weeks for PVR\par If inability to void to ED \par \par UTI\par Complete antibiotic course\par \par With previous hematuria while inpatient at [facility]. Cystoscopy discussed with pt and daughters and at this time patient and family does not want Cysto done. Discussed gross hematuria and concern for bladder cancer. PT and family verbalized an understanding.

## 2020-08-13 NOTE — HISTORY OF PRESENT ILLNESS
[Straining] : straining [None] : None [Dysuria] : dysuria [FreeTextEntry1] : S/P transcatheter aortic valve replacement 7/24, developed urinary retention and had gonzalez placed. Failed multiple TOV and had to be irrigated due to clots at [facility] ED 8/3.  Is on Proscar, Tamsulosin 1 capsule for many years. Last episode of hematuria was Monday afternoon. As per daughter urine cloudy with sediment. With dysuria, sometimes straining . Is on ASA, Plavix. Accompanied by daughter, Jerica.

## 2020-08-13 NOTE — PHYSICAL EXAM
[General Appearance - Well Developed] : well developed [General Appearance - Well Nourished] : well nourished [Normal Appearance] : normal appearance [Well Groomed] : well groomed [General Appearance - In No Acute Distress] : no acute distress [Respiration, Rhythm And Depth] : normal respiratory rhythm and effort [Exaggerated Use Of Accessory Muscles For Inspiration] : no accessory muscle use [Abdomen Soft] : soft [Abdomen Tenderness] : non-tender [Penis Abnormality] : normal uncircumcised penis [Urethral Meatus] : meatus normal [Scrotum] : the scrotum was normal [Testes Tenderness] : no tenderness of the testes [Normal Station and Gait] : the gait and station were normal for the patient's age [] : no rash [Affect] : the affect was normal [Oriented To Time, Place, And Person] : oriented to person, place, and time [FreeTextEntry1] : s

## 2020-08-14 DIAGNOSIS — E87.1 HYPO-OSMOLALITY AND HYPONATREMIA: ICD-10-CM

## 2020-08-17 ENCOUNTER — TRANSCRIPTION ENCOUNTER (OUTPATIENT)
Age: 85
End: 2020-08-17

## 2020-08-18 ENCOUNTER — APPOINTMENT (OUTPATIENT)
Dept: ELECTROPHYSIOLOGY | Facility: CLINIC | Age: 85
End: 2020-08-18
Payer: MEDICARE

## 2020-08-18 VITALS
HEIGHT: 64 IN | OXYGEN SATURATION: 98 % | TEMPERATURE: 97.5 F | BODY MASS INDEX: 20.32 KG/M2 | DIASTOLIC BLOOD PRESSURE: 77 MMHG | WEIGHT: 119 LBS | HEART RATE: 83 BPM | SYSTOLIC BLOOD PRESSURE: 145 MMHG

## 2020-08-18 PROCEDURE — 93279 PRGRMG DEV EVAL PM/LDLS PM: CPT

## 2020-08-18 PROCEDURE — 99024 POSTOP FOLLOW-UP VISIT: CPT

## 2020-08-18 NOTE — PROCEDURE
[No] : not [Pacemaker] : pacemaker [Atrial Fibrillation] : atrial fibrillation [VVIR] : VVIR [Threshold Testing Performed] : Threshold testing was performed [Lead Imp:  ___ohms] : lead impedance was [unfilled] ohms [Sensing Amplitude ___mv] : sensing amplitude was [unfilled] mv [___V @] : [unfilled] V [de-identified] : Komala AV [___ ms] : [unfilled] ms [de-identified] : Medtronic [de-identified] : 07/29/20 [de-identified] : [id number] [de-identified] : 60

## 2020-08-18 NOTE — PHYSICAL EXAM
[Healing Well] : healing well [Bleeding] : no active bleeding [Erythema] : not erythematous [Tender] : not tender [FreeTextEntry1] : Bilateral femoral access opints

## 2020-08-18 NOTE — HISTORY OF PRESENT ILLNESS
[de-identified] : Mr. Whitman is an 88 y/m with history of HTN, HLD, CAD (s/p PCI to the LAD 6/20), residual 90% lCx and ostial RCA ), PAF, baseline first degree AVB and RBBB, severe AS who underwent elective TAVR on 7/24/20. Post operatively the patient was found to have regularized AF with CHB and slow escape rhythm with varying conduction morphologies. He was given a semi-permenent pacemaker and has required ventricular pacing for hemodynamic support. He has needed on-going beta blockade for CAD. Given AF with slow ventricular response with possible CHB, PPM implantation was recommended. Leadless pacemaker was recommended due to frailty and elevated risk of infection. He is now s/p Micra AV implantation on 7/29/20. \par \par Since implant he reports he has been feeling well. Denies lightheadedness, dizziness, near syncope, syncope. \par \par Interrogation of micra pacemaker reveals normal function in VVIR mode.  Underlying remains in AF with slow ventricular response 40's.  99.3%. \par \par Ref: Dr. Lake

## 2020-08-18 NOTE — DISCUSSION/SUMMARY
[FreeTextEntry1] : Mr. Whitman is an 88 y/m with history of HTN, HLD, CAD (s/p PCI to the LAD 6/20), residual 90% lCx and ostial RCA ), PAF, baseline first degree AVB and RBBB, severe AS who underwent elective TAVR on 7/24/20. Post operatively the patient was found to have regularized AF with CHB and slow escape rhythm with varying conduction morphologies. He was given a semi-permenent pacemaker and has required ventricular pacing for hemodynamic support. He has needed on-going beta blockade for CAD. Given AF with slow ventricular response with possible CHB, PPM implantation was recommended. Leadless pacemaker was recommended due to frailty and elevated risk of infection. He is now s/p Micra AV implantation on 7/29/20. \par \par Since implant he reports he has been feeling well. Denies lightheadedness, dizziness, near syncope, syncope. \par \par Interrogation of micra pacemaker reveals normal function in VVIR mode.  Underlying remains in AF with slow ventricular response 40's.  99.3%. \par \par Recommendation:\par \par Site care, pacemaker follow up reviewed. To return to office in 3 months for chronic settings. \par \par Beverly LENNONC

## 2020-08-19 ENCOUNTER — TRANSCRIPTION ENCOUNTER (OUTPATIENT)
Age: 85
End: 2020-08-19

## 2020-08-19 ENCOUNTER — APPOINTMENT (OUTPATIENT)
Dept: NEPHROLOGY | Facility: CLINIC | Age: 85
End: 2020-08-19

## 2020-08-26 ENCOUNTER — TRANSCRIPTION ENCOUNTER (OUTPATIENT)
Age: 85
End: 2020-08-26

## 2020-08-27 ENCOUNTER — APPOINTMENT (OUTPATIENT)
Dept: UROLOGY | Facility: CLINIC | Age: 85
End: 2020-08-27

## 2020-08-31 ENCOUNTER — TRANSCRIPTION ENCOUNTER (OUTPATIENT)
Age: 85
End: 2020-08-31

## 2020-09-22 ENCOUNTER — APPOINTMENT (OUTPATIENT)
Dept: UROLOGY | Facility: CLINIC | Age: 85
End: 2020-09-22
Payer: MEDICARE

## 2020-09-22 VITALS
RESPIRATION RATE: 14 BRPM | DIASTOLIC BLOOD PRESSURE: 74 MMHG | HEART RATE: 77 BPM | SYSTOLIC BLOOD PRESSURE: 160 MMHG | TEMPERATURE: 97.4 F

## 2020-09-22 DIAGNOSIS — R33.9 RETENTION OF URINE, UNSPECIFIED: ICD-10-CM

## 2020-09-22 PROCEDURE — 81003 URINALYSIS AUTO W/O SCOPE: CPT | Mod: QW

## 2020-09-22 PROCEDURE — 99214 OFFICE O/P EST MOD 30 MIN: CPT

## 2020-09-22 PROCEDURE — 51798 US URINE CAPACITY MEASURE: CPT

## 2020-09-22 RX ORDER — CIPROFLOXACIN HYDROCHLORIDE 500 MG/1
500 TABLET, FILM COATED ORAL
Qty: 20 | Refills: 0 | Status: DISCONTINUED | COMMUNITY
Start: 2020-09-04

## 2020-09-22 RX ORDER — CLOPIDOGREL 75 MG/1
75 TABLET, FILM COATED ORAL
Refills: 0 | Status: DISCONTINUED | COMMUNITY
End: 2020-09-22

## 2020-09-22 RX ORDER — UREA 15 G
15 POWDER IN PACKET (EA) ORAL
Refills: 0 | Status: DISCONTINUED | COMMUNITY
Start: 2020-08-11 | End: 2020-09-22

## 2020-09-22 RX ORDER — CEPHALEXIN 500 MG/1
500 CAPSULE ORAL
Qty: 10 | Refills: 0 | Status: DISCONTINUED | COMMUNITY
Start: 2020-08-11 | End: 2020-09-22

## 2020-09-22 NOTE — REVIEW OF SYSTEMS
[Nocturia] : nocturia [Wake up at night to urinate  How many times?  ___] : wakes up to urinate [unfilled] times during the night [Strain or push to urinate] : strain or push to urinate [Slow urine stream] : slow urine stream [Negative] : Heme/Lymph

## 2020-09-22 NOTE — HISTORY OF PRESENT ILLNESS
[Nocturia] : nocturia [Straining] : straining [Weak Stream] : weak stream [None] : None [FreeTextEntry1] : C/o slow stream, straining, sprayed stream, slight "sting", nocturia 4x/night. He does feel that he empties out, does not drink enough fluids.Was treated for Pseudomonas UTI 9/4, with Cipro. Repeat UTI neg. Accompanied by daughter.

## 2020-09-22 NOTE — PHYSICAL EXAM
[General Appearance - Well Developed] : well developed [General Appearance - Well Nourished] : well nourished [Normal Appearance] : normal appearance [Well Groomed] : well groomed [General Appearance - In No Acute Distress] : no acute distress [Abdomen Soft] : soft [Abdomen Tenderness] : non-tender [] : no respiratory distress [Respiration, Rhythm And Depth] : normal respiratory rhythm and effort [Exaggerated Use Of Accessory Muscles For Inspiration] : no accessory muscle use [Oriented To Time, Place, And Person] : oriented to person, place, and time [Affect] : the affect was normal [Mood] : the mood was normal [Not Anxious] : not anxious [Normal Station and Gait] : the gait and station were normal for the patient's age [FreeTextEntry1] : peeling skin to hands

## 2020-09-22 NOTE — ASSESSMENT
[FreeTextEntry1] : Incomplete bladder emptying\par \par  ml\par Discussed double voiding\par \par Microscopic hematuria\par \par Will send Cytology\par CT angio> normal kidney and bladders\par Schedule Cysto with Dr. Vega\par \par RTO 1 week for Cysto\par \par BPH\par \par Continue Tamsulosin & Finasteride\par

## 2020-09-24 LAB
BILIRUB UR QL STRIP: NORMAL
CLARITY UR: CLEAR
COLLECTION METHOD: NORMAL
GLUCOSE UR-MCNC: NORMAL
HCG UR QL: 0.2 EU/DL
HGB UR QL STRIP.AUTO: NORMAL
KETONES UR-MCNC: NORMAL
LEUKOCYTE ESTERASE UR QL STRIP: NORMAL
NITRITE UR QL STRIP: NORMAL
PH UR STRIP: 6
PROT UR STRIP-MCNC: 30
SP GR UR STRIP: 1.01

## 2020-09-24 RX ORDER — LEVOFLOXACIN 500 MG/1
500 TABLET, FILM COATED ORAL DAILY
Qty: 5 | Refills: 0 | Status: ACTIVE | COMMUNITY
Start: 2020-09-24 | End: 1900-01-01

## 2020-09-25 LAB — URINE CYTOLOGY: NORMAL

## 2020-09-29 ENCOUNTER — APPOINTMENT (OUTPATIENT)
Dept: UROLOGY | Facility: CLINIC | Age: 85
End: 2020-09-29
Payer: MEDICARE

## 2020-09-29 VITALS
RESPIRATION RATE: 16 BRPM | HEART RATE: 78 BPM | SYSTOLIC BLOOD PRESSURE: 160 MMHG | TEMPERATURE: 97.7 F | DIASTOLIC BLOOD PRESSURE: 77 MMHG

## 2020-09-29 DIAGNOSIS — Z87.440 PERSONAL HISTORY OF URINARY (TRACT) INFECTIONS: ICD-10-CM

## 2020-09-29 DIAGNOSIS — R31.29 OTHER MICROSCOPIC HEMATURIA: ICD-10-CM

## 2020-09-29 PROCEDURE — 52000 CYSTOURETHROSCOPY: CPT

## 2020-11-01 NOTE — PROCEDURE NOTE - ADDITIONAL PROCEDURE DETAILS
declines
gonzalez cath irrigated after catheter inserted, few small clots aspirated, old dark blood in bladder, now draining lighter in color

## 2020-11-12 ENCOUNTER — APPOINTMENT (OUTPATIENT)
Dept: ELECTROPHYSIOLOGY | Facility: CLINIC | Age: 85
End: 2020-11-12
Payer: MEDICARE

## 2020-11-12 PROCEDURE — 93296 REM INTERROG EVL PM/IDS: CPT

## 2020-11-12 PROCEDURE — 93294 REM INTERROG EVL PM/LDLS PM: CPT

## 2020-11-13 ENCOUNTER — APPOINTMENT (OUTPATIENT)
Dept: UROLOGY | Facility: CLINIC | Age: 85
End: 2020-11-13

## 2020-11-19 ENCOUNTER — APPOINTMENT (OUTPATIENT)
Dept: ELECTROPHYSIOLOGY | Facility: CLINIC | Age: 85
End: 2020-11-19
Payer: MEDICARE

## 2020-11-19 VITALS
WEIGHT: 120 LBS | DIASTOLIC BLOOD PRESSURE: 78 MMHG | HEART RATE: 71 BPM | OXYGEN SATURATION: 98 % | HEIGHT: 64 IN | SYSTOLIC BLOOD PRESSURE: 128 MMHG | BODY MASS INDEX: 20.49 KG/M2

## 2020-11-19 DIAGNOSIS — I48.19 OTHER PERSISTENT ATRIAL FIBRILLATION: ICD-10-CM

## 2020-11-19 DIAGNOSIS — R00.1 BRADYCARDIA, UNSPECIFIED: ICD-10-CM

## 2020-11-19 DIAGNOSIS — I48.3 TYPICAL ATRIAL FLUTTER: ICD-10-CM

## 2020-11-19 DIAGNOSIS — Z95.0 PRESENCE OF CARDIAC PACEMAKER: ICD-10-CM

## 2020-11-19 PROCEDURE — 93000 ELECTROCARDIOGRAM COMPLETE: CPT | Mod: 59

## 2020-11-19 PROCEDURE — 93279 PRGRMG DEV EVAL PM/LDLS PM: CPT | Mod: 59

## 2020-11-19 PROCEDURE — 99213 OFFICE O/P EST LOW 20 MIN: CPT | Mod: 25

## 2020-11-19 NOTE — HISTORY OF PRESENT ILLNESS
[FreeTextEntry1] : Mr. Whitman is an 88 y/m with history of HTN, HLD, CAD (s/p PCI to the LAD 6/20; residual 90% LCx and ostial RCA ), paroxysmal AF, baseline first degree AVB and RBBB, severe AS s/p TAVR (7/24/2020) now s/p Micra AV leadless pacemaker (7/29/2020) who presents for follow up.\par \par To summarize his history, he underwent TAVR implantation on 7/24/2020 for severe AS. Following deployment he was found to have regularized AF with complete heart block and a slow escape rhythm with varying conduction morphologies. He required ventricular pacing for hemodynamic support. He therefore underwent Micra AV leadless pacemaker implantation on 7/29/2020. He felt well in follow up and has remained in AF with slow ventricular response.\par \par Today, the patient states that he is doing well. He has some right groin pain over the past few days which he attributes to a hernia. He denies any fevers, chills, cough, sweats, chest pain, palpitations, dyspnea on exertion, orthopnea, paroxysmal nocturnal dyspnea, peripheral edema, lightheadedness, dizziness, syncope, nausea, vomiting, melena, hematochezia, or hematemesis.

## 2020-11-19 NOTE — REASON FOR VISIT
[Follow-Up - Clinic] : a clinic follow-up of [Atrial Fibrillation] : atrial fibrillation [Pacemaker Evaluation] : pacemaker ~T evaluation ~C was performed [FreeTextEntry1] : Cardiologist: Dr. Lake

## 2020-11-19 NOTE — DISCUSSION/SUMMARY
[FreeTextEntry1] : Mr. Whitman is an 88 y/m with history of HTN, HLD, CAD (s/p PCI to the LAD 6/20; residual 90% LCx and ostial RCA ), paroxysmal AF, baseline first degree AVB and RBBB, severe AS s/p TAVR (7/24/2020) now s/p Micra AV leadless pacemaker (7/29/2020) who presents for follow up.\par \par His device continues to function well. His device was adjusted to chronic settings to optimize longevity.\par \par He will remain on Apixaban for prevention of stroke given his elevated CHADS2-VASc score of 4 (HTN, Agex2, CAD).\par \par Recommendations:\par - Continue anticoagulation for AF\par - Continue with remote monitoring of his device.\par \par Follow up in 1 year\par \par Doe Yip MD, FACC, [facility]\par Clinical Cardiac Electrophysiology

## 2020-11-19 NOTE — PHYSICAL EXAM
[General Appearance - Well Developed] : well developed [General Appearance - Well Nourished] : well nourished [Normal Conjunctiva] : the conjunctiva exhibited no abnormalities [Respiration, Rhythm And Depth] : normal respiratory rhythm and effort [Exaggerated Use Of Accessory Muscles For Inspiration] : no accessory muscle use [Auscultation Breath Sounds / Voice Sounds] : lungs were clear to auscultation bilaterally [Heart Rate And Rhythm] : heart rate and rhythm were normal [Heart Sounds] : normal S1 and S2 [Arterial Pulses Normal] : the arterial pulses were normal [Edema] : no peripheral edema present [Bowel Sounds] : normal bowel sounds [Abdomen Soft] : soft [Abdomen Tenderness] : non-tender [Abnormal Walk] : normal gait [Nail Clubbing] : no clubbing of the fingernails [Cyanosis, Localized] : no localized cyanosis [Skin Color & Pigmentation] : normal skin color and pigmentation [Skin Turgor] : normal skin turgor [] : no rash [Oriented To Time, Place, And Person] : oriented to person, place, and time [Impaired Insight] : insight and judgment were intact [No Anxiety] : not feeling anxious [FreeTextEntry1] : Right groin without bruit or thrill

## 2020-12-23 PROBLEM — Z87.440 HISTORY OF URINARY TRACT INFECTION: Status: RESOLVED | Noted: 2020-08-11 | Resolved: 2020-12-23

## 2021-01-01 ENCOUNTER — APPOINTMENT (OUTPATIENT)
Dept: ELECTROPHYSIOLOGY | Facility: CLINIC | Age: 86
End: 2021-01-01
Payer: MEDICARE

## 2021-01-01 ENCOUNTER — NON-APPOINTMENT (OUTPATIENT)
Age: 86
End: 2021-01-01

## 2021-01-01 PROCEDURE — 93294 REM INTERROG EVL PM/LDLS PM: CPT

## 2021-01-01 PROCEDURE — 93296 REM INTERROG EVL PM/IDS: CPT

## 2021-02-05 NOTE — PROGRESS NOTE ADULT - NEGATIVE PSYCHIATRIC SYMPTOMS
Below is some helpful information about your upcoming surgery.  • Please arrive at [facility] at 0600 on 2/10/2021.  • Please do not eat after midnight, the night prior to your surgery.  It is ok to drink clear liquids up until 330.  Some examples of clear liquids include: water, apple juice, jello or black coffee.   Please don’t drink anything with pulp such as, orange juice.    • Please take the following medication the morning of surgery: None.  Please bring medications that you take in their original prescription bottles the day of surgery.  • Bring any cases for your contact lens, dentures, partials or glasses.  • Leave any valuables at home and remove all jewelry prior to your arrival.  Please don't wear any make-up or hair product the morning of surgery.    • Bring your insurance card and a photo ID.  • Make sure you have arranged for someone to bring you home.     If you were to become ill prior to your surgery, please contact your family care physician.  The quality of your stay is important to us.  We want to ensure you have excellent care during your stay.  Please don’t hesitate to call with any questions about your surgery at [phone] (hours of operation are 8am-4pm Monday-Friday).  We look forward to caring for you!    Sincerely,   The Pre-surgical Evaluation Department    no depression/no anxiety

## 2021-02-16 ENCOUNTER — APPOINTMENT (OUTPATIENT)
Dept: ELECTROPHYSIOLOGY | Facility: CLINIC | Age: 86
End: 2021-02-16

## 2021-03-01 ENCOUNTER — APPOINTMENT (OUTPATIENT)
Dept: ELECTROPHYSIOLOGY | Facility: CLINIC | Age: 86
End: 2021-03-01
Payer: MEDICARE

## 2021-03-01 ENCOUNTER — NON-APPOINTMENT (OUTPATIENT)
Age: 86
End: 2021-03-01

## 2021-03-01 PROCEDURE — 93294 REM INTERROG EVL PM/LDLS PM: CPT

## 2021-03-01 PROCEDURE — 93296 REM INTERROG EVL PM/IDS: CPT

## 2022-01-01 ENCOUNTER — APPOINTMENT (OUTPATIENT)
Dept: ELECTROPHYSIOLOGY | Facility: CLINIC | Age: 87
End: 2022-01-01
Payer: MEDICARE

## 2022-01-01 ENCOUNTER — NON-APPOINTMENT (OUTPATIENT)
Age: 87
End: 2022-01-01

## 2022-01-01 PROCEDURE — 93294 REM INTERROG EVL PM/LDLS PM: CPT

## 2022-01-01 PROCEDURE — 93296 REM INTERROG EVL PM/IDS: CPT

## 2022-05-31 ENCOUNTER — APPOINTMENT (OUTPATIENT)
Dept: ELECTROPHYSIOLOGY | Facility: CLINIC | Age: 87
End: 2022-05-31

## 2022-06-01 ENCOUNTER — FORM ENCOUNTER (OUTPATIENT)
Age: 87
End: 2022-06-01

## 2022-06-28 ENCOUNTER — APPOINTMENT (OUTPATIENT)
Dept: ELECTROPHYSIOLOGY | Facility: CLINIC | Age: 87
End: 2022-06-28

## 2022-12-28 NOTE — CONSULT NOTE ADULT - PROVIDER SPECIALTY LIST ADULT
Electrophysiology
Infectious Disease
Strong peripheral pulses/Capillary refill less/equal to 2 seconds

## 2023-02-23 NOTE — PROGRESS NOTE ADULT - PROVIDER SPECIALTY LIST ADULT
CT Surgery
Electrophysiology
Infectious Disease
Electrophysiology
normal

## 2023-12-15 NOTE — ED CDU PROVIDER INITIAL DAY NOTE - [facility]
Discharge Planning Assessment  [facility]     Patient Name: Sunny Hung  MRN: [id number]  Today's Date: 12/15/2023    Admit Date: 12/13/2023    Plan: Home   Discharge Needs Assessment       Row Name 12/15/23 1358       Living Environment    People in Home spouse    Current Living Arrangements home    Potentially Unsafe Housing Conditions none    Provides Primary Care For no one    Family Caregiver if Needed none    Quality of Family Relationships supportive       Resource/Environmental Concerns    Resource/Environmental Concerns none       Transition Planning    Patient/Family Anticipates Transition to home with family    Patient/Family Anticipated Services at Transition none    Transportation Anticipated family or friend will provide       Discharge Needs Assessment    Equipment Currently Used at Home none    Concerns to be Addressed no discharge needs identified    Equipment Needed After Discharge none                   Discharge Plan       Row Name 12/15/23 4528       Plan    Plan Home    Patient/Family in Agreement with Plan yes    Plan Comments Spoke with pt at bedside. Introduced self, explained CCP role, facesheet verified. Pt states  he lives with wife and is independent with ADLs. No history of DME, HH, or SNF.  Plans to return home at [facility]. No identified needs at this time.  MARTIN Montanez RN                  Continued Care and Services - Admitted Since 12/13/2023    Coordination has not been started for this encounter.          Demographic Summary       Row Name 12/15/23 1358       General Information    Admission Type inpatient    Arrived From home    Referral Source admission list    Reason for Consult discharge planning    Preferred Language English       Contact Information    Permission Granted to Share Info With family/designee  wife                   Functional Status    No documentation.                  Psychosocial    No documentation.                  Abuse/Neglect    No  documentation.                  Legal    No documentation.                  Substance Abuse    No documentation.                  Patient Forms    No documentation.                     Kathy Montanez RN     AF (atrial fibrillation)    Coronary artery disease  status post cardiac stents June 2020  Former tobacco use  quit 1984  HTN (hypertension)    Hyperlipidemia, mild    Moderate mitral regurgitation    Severe aortic stenosis    Sinus bradycardia